# Patient Record
Sex: MALE | Race: WHITE | Employment: OTHER | URBAN - METROPOLITAN AREA
[De-identification: names, ages, dates, MRNs, and addresses within clinical notes are randomized per-mention and may not be internally consistent; named-entity substitution may affect disease eponyms.]

---

## 2019-04-26 ENCOUNTER — APPOINTMENT (OUTPATIENT)
Dept: GENERAL RADIOLOGY | Age: 84
DRG: 482 | End: 2019-04-26
Attending: ORTHOPAEDIC SURGERY
Payer: MEDICARE

## 2019-04-26 ENCOUNTER — APPOINTMENT (OUTPATIENT)
Dept: GENERAL RADIOLOGY | Age: 84
DRG: 482 | End: 2019-04-26
Attending: EMERGENCY MEDICINE
Payer: MEDICARE

## 2019-04-26 ENCOUNTER — ANESTHESIA EVENT (OUTPATIENT)
Dept: SURGERY | Age: 84
DRG: 482 | End: 2019-04-26
Payer: MEDICARE

## 2019-04-26 ENCOUNTER — ANESTHESIA (OUTPATIENT)
Dept: SURGERY | Age: 84
DRG: 482 | End: 2019-04-26
Payer: MEDICARE

## 2019-04-26 ENCOUNTER — HOSPITAL ENCOUNTER (INPATIENT)
Age: 84
LOS: 3 days | Discharge: NURSING, REHAB OR LONG-TERM CARE | DRG: 482 | End: 2019-04-29
Attending: EMERGENCY MEDICINE | Admitting: FAMILY MEDICINE
Payer: MEDICARE

## 2019-04-26 DIAGNOSIS — S72.001A CLOSED DISPLACED FRACTURE OF RIGHT FEMORAL NECK (HCC): Primary | ICD-10-CM

## 2019-04-26 DIAGNOSIS — S72.001A CLOSED FRACTURE OF RIGHT HIP, INITIAL ENCOUNTER (HCC): ICD-10-CM
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
## 2019-04-26 PROBLEM — I10 HTN (HYPERTENSION): Status: ACTIVE | Noted: 2019-04-26

## 2019-04-26 PROBLEM — I48.91 ATRIAL FIBRILLATION (HCC): Status: ACTIVE | Noted: 2019-04-26

## 2019-04-26 PROBLEM — S72.009A HIP FRACTURE (HCC): Status: ACTIVE | Noted: 2019-04-26

## 2019-04-26 LAB
ABO + RH BLD: NORMAL
ANION GAP SERPL CALC-SCNC: 8 MMOL/L (ref 3–18)
APTT PPP: 39.3 SEC (ref 23–36.4)
ATRIAL RATE: 74 BPM
BASOPHILS # BLD: 0 K/UL (ref 0–0.1)
BASOPHILS NFR BLD: 0 % (ref 0–2)
BLOOD GROUP ANTIBODIES SERPL: NORMAL
BUN SERPL-MCNC: 27 MG/DL (ref 7–18)
BUN/CREAT SERPL: 23 (ref 12–20)
CALCIUM SERPL-MCNC: 8.7 MG/DL (ref 8.5–10.1)
CALCULATED R AXIS, ECG10: -48 DEGREES
CALCULATED T AXIS, ECG11: 36 DEGREES
CHLORIDE SERPL-SCNC: 107 MMOL/L (ref 100–108)
CO2 SERPL-SCNC: 26 MMOL/L (ref 21–32)
CREAT SERPL-MCNC: 1.2 MG/DL (ref 0.6–1.3)
DIAGNOSIS, 93000: NORMAL
DIFFERENTIAL METHOD BLD: ABNORMAL
EOSINOPHIL # BLD: 0.2 K/UL (ref 0–0.4)
EOSINOPHIL NFR BLD: 2 % (ref 0–5)
ERYTHROCYTE [DISTWIDTH] IN BLOOD BY AUTOMATED COUNT: 14.4 % (ref 11.6–14.5)
EST. AVERAGE GLUCOSE BLD GHB EST-MCNC: 148 MG/DL
GLUCOSE BLD STRIP.AUTO-MCNC: 106 MG/DL (ref 70–110)
GLUCOSE BLD STRIP.AUTO-MCNC: 163 MG/DL (ref 70–110)
GLUCOSE BLD STRIP.AUTO-MCNC: 164 MG/DL (ref 70–110)
GLUCOSE BLD STRIP.AUTO-MCNC: 92 MG/DL (ref 70–110)
GLUCOSE BLD STRIP.AUTO-MCNC: 96 MG/DL (ref 70–110)
GLUCOSE SERPL-MCNC: 95 MG/DL (ref 74–99)
HBA1C MFR BLD: 6.8 % (ref 4.2–5.6)
HCT VFR BLD AUTO: 37.9 % (ref 36–48)
HGB BLD-MCNC: 12.2 G/DL (ref 13–16)
INR PPP: 2 (ref 0.8–1.2)
INR PPP: 2.3 (ref 0.8–1.2)
LYMPHOCYTES # BLD: 1.4 K/UL (ref 0.9–3.6)
LYMPHOCYTES NFR BLD: 13 % (ref 21–52)
MCH RBC QN AUTO: 27.7 PG (ref 24–34)
MCHC RBC AUTO-ENTMCNC: 32.2 G/DL (ref 31–37)
MCV RBC AUTO: 86.1 FL (ref 74–97)
MONOCYTES # BLD: 0.8 K/UL (ref 0.05–1.2)
MONOCYTES NFR BLD: 8 % (ref 3–10)
NEUTS SEG # BLD: 8.2 K/UL (ref 1.8–8)
NEUTS SEG NFR BLD: 77 % (ref 40–73)
PLATELET # BLD AUTO: 211 K/UL (ref 135–420)
PMV BLD AUTO: 10.2 FL (ref 9.2–11.8)
POTASSIUM SERPL-SCNC: 4.8 MMOL/L (ref 3.5–5.5)
PROTHROMBIN TIME: 23 SEC (ref 11.5–15.2)
PROTHROMBIN TIME: 25.5 SEC (ref 11.5–15.2)
Q-T INTERVAL, ECG07: 404 MS
QRS DURATION, ECG06: 100 MS
QTC CALCULATION (BEZET), ECG08: 413 MS
RBC # BLD AUTO: 4.4 M/UL (ref 4.7–5.5)
SODIUM SERPL-SCNC: 141 MMOL/L (ref 136–145)
SPECIMEN EXP DATE BLD: NORMAL
VENTRICULAR RATE, ECG03: 63 BPM
WBC # BLD AUTO: 10.6 K/UL (ref 4.6–13.2)

## 2019-04-26 PROCEDURE — 76010000149 HC OR TIME 1 TO 1.5 HR: Performed by: ORTHOPAEDIC SURGERY

## 2019-04-26 PROCEDURE — 99284 EMERGENCY DEPT VISIT MOD MDM: CPT

## 2019-04-26 PROCEDURE — 76210000017 HC OR PH I REC 1.5 TO 2 HR: Performed by: ORTHOPAEDIC SURGERY

## 2019-04-26 PROCEDURE — 83036 HEMOGLOBIN GLYCOSYLATED A1C: CPT

## 2019-04-26 PROCEDURE — 74011250636 HC RX REV CODE- 250/636: Performed by: EMERGENCY MEDICINE

## 2019-04-26 PROCEDURE — C1713 ANCHOR/SCREW BN/BN,TIS/BN: HCPCS | Performed by: ORTHOPAEDIC SURGERY

## 2019-04-26 PROCEDURE — 73080 X-RAY EXAM OF ELBOW: CPT

## 2019-04-26 PROCEDURE — 0QH634Z INSERTION OF INTERNAL FIXATION DEVICE INTO RIGHT UPPER FEMUR, PERCUTANEOUS APPROACH: ICD-10-PCS | Performed by: ORTHOPAEDIC SURGERY

## 2019-04-26 PROCEDURE — 65270000029 HC RM PRIVATE

## 2019-04-26 PROCEDURE — 86900 BLOOD TYPING SEROLOGIC ABO: CPT

## 2019-04-26 PROCEDURE — 80048 BASIC METABOLIC PNL TOTAL CA: CPT

## 2019-04-26 PROCEDURE — 74011000272 HC RX REV CODE- 272: Performed by: ORTHOPAEDIC SURGERY

## 2019-04-26 PROCEDURE — 74011250636 HC RX REV CODE- 250/636: Performed by: FAMILY MEDICINE

## 2019-04-26 PROCEDURE — 85025 COMPLETE CBC W/AUTO DIFF WBC: CPT

## 2019-04-26 PROCEDURE — 76060000033 HC ANESTHESIA 1 TO 1.5 HR: Performed by: ORTHOPAEDIC SURGERY

## 2019-04-26 PROCEDURE — 93005 ELECTROCARDIOGRAM TRACING: CPT

## 2019-04-26 PROCEDURE — 77030013079 HC BLNKT BAIR HGGR 3M -A: Performed by: NURSE ANESTHETIST, CERTIFIED REGISTERED

## 2019-04-26 PROCEDURE — 74011250636 HC RX REV CODE- 250/636

## 2019-04-26 PROCEDURE — 74011250636 HC RX REV CODE- 250/636: Performed by: NURSE ANESTHETIST, CERTIFIED REGISTERED

## 2019-04-26 PROCEDURE — 82962 GLUCOSE BLOOD TEST: CPT

## 2019-04-26 PROCEDURE — P9059 PLASMA, FRZ BETWEEN 8-24HOUR: HCPCS

## 2019-04-26 PROCEDURE — 74011000258 HC RX REV CODE- 258: Performed by: ORTHOPAEDIC SURGERY

## 2019-04-26 PROCEDURE — 77030008462 HC STPLR SKN PROX J&J -A: Performed by: ORTHOPAEDIC SURGERY

## 2019-04-26 PROCEDURE — 74011250636 HC RX REV CODE- 250/636: Performed by: ORTHOPAEDIC SURGERY

## 2019-04-26 PROCEDURE — 77030003029 HC SUT VCRL J&J -B: Performed by: ORTHOPAEDIC SURGERY

## 2019-04-26 PROCEDURE — 77030031139 HC SUT VCRL2 J&J -A: Performed by: ORTHOPAEDIC SURGERY

## 2019-04-26 PROCEDURE — 73502 X-RAY EXAM HIP UNI 2-3 VIEWS: CPT

## 2019-04-26 PROCEDURE — 77030032490 HC SLV COMPR SCD KNE COVD -B

## 2019-04-26 PROCEDURE — 36430 TRANSFUSION BLD/BLD COMPNT: CPT

## 2019-04-26 PROCEDURE — 85730 THROMBOPLASTIN TIME PARTIAL: CPT

## 2019-04-26 PROCEDURE — 77030012510 HC MSK AIRWY LMA TELE -B: Performed by: NURSE ANESTHETIST, CERTIFIED REGISTERED

## 2019-04-26 PROCEDURE — 71045 X-RAY EXAM CHEST 1 VIEW: CPT

## 2019-04-26 PROCEDURE — 36415 COLL VENOUS BLD VENIPUNCTURE: CPT

## 2019-04-26 PROCEDURE — 74011636637 HC RX REV CODE- 636/637: Performed by: FAMILY MEDICINE

## 2019-04-26 PROCEDURE — 77030018836 HC SOL IRR NACL ICUM -A: Performed by: ORTHOPAEDIC SURGERY

## 2019-04-26 PROCEDURE — 85610 PROTHROMBIN TIME: CPT

## 2019-04-26 PROCEDURE — 77030021352 HC CBL LD SYS DISP COVD -B

## 2019-04-26 PROCEDURE — 96374 THER/PROPH/DIAG INJ IV PUSH: CPT

## 2019-04-26 DEVICE — CANNULATED SCREW
Type: IMPLANTABLE DEVICE | Site: HIP | Status: FUNCTIONAL
Brand: ASNIS

## 2019-04-26 RX ORDER — OXYCODONE AND ACETAMINOPHEN 5; 325 MG/1; MG/1
1-2 TABLET ORAL
Status: DISCONTINUED | OUTPATIENT
Start: 2019-04-26 | End: 2019-04-29 | Stop reason: HOSPADM

## 2019-04-26 RX ORDER — FAMOTIDINE 20 MG/1
20 TABLET, FILM COATED ORAL DAILY
Status: DISCONTINUED | OUTPATIENT
Start: 2019-04-26 | End: 2019-04-29 | Stop reason: HOSPADM

## 2019-04-26 RX ORDER — ONDANSETRON 2 MG/ML
4 INJECTION INTRAMUSCULAR; INTRAVENOUS ONCE
Status: DISCONTINUED | OUTPATIENT
Start: 2019-04-26 | End: 2019-04-26 | Stop reason: HOSPADM

## 2019-04-26 RX ORDER — ACETAMINOPHEN 325 MG/1
650 TABLET ORAL EVERY 6 HOURS
Status: DISCONTINUED | OUTPATIENT
Start: 2019-04-27 | End: 2019-04-29 | Stop reason: HOSPADM

## 2019-04-26 RX ORDER — NALOXONE HYDROCHLORIDE 0.4 MG/ML
0.1 INJECTION, SOLUTION INTRAMUSCULAR; INTRAVENOUS; SUBCUTANEOUS AS NEEDED
Status: DISCONTINUED | OUTPATIENT
Start: 2019-04-26 | End: 2019-04-26 | Stop reason: HOSPADM

## 2019-04-26 RX ORDER — SODIUM CHLORIDE 0.9 % (FLUSH) 0.9 %
5-40 SYRINGE (ML) INJECTION AS NEEDED
Status: DISCONTINUED | OUTPATIENT
Start: 2019-04-26 | End: 2019-04-29 | Stop reason: HOSPADM

## 2019-04-26 RX ORDER — INSULIN LISPRO 100 [IU]/ML
INJECTION, SOLUTION INTRAVENOUS; SUBCUTANEOUS
Status: DISCONTINUED | OUTPATIENT
Start: 2019-04-26 | End: 2019-04-29 | Stop reason: HOSPADM

## 2019-04-26 RX ORDER — DEXTROSE MONOHYDRATE AND SODIUM CHLORIDE 5; .45 G/100ML; G/100ML
75 INJECTION, SOLUTION INTRAVENOUS CONTINUOUS
Status: DISCONTINUED | OUTPATIENT
Start: 2019-04-26 | End: 2019-04-27

## 2019-04-26 RX ORDER — MORPHINE SULFATE 2 MG/ML
2 INJECTION, SOLUTION INTRAMUSCULAR; INTRAVENOUS
Status: DISCONTINUED | OUTPATIENT
Start: 2019-04-26 | End: 2019-04-29 | Stop reason: HOSPADM

## 2019-04-26 RX ORDER — INSULIN LISPRO 100 [IU]/ML
INJECTION, SOLUTION INTRAVENOUS; SUBCUTANEOUS ONCE
Status: DISCONTINUED | OUTPATIENT
Start: 2019-04-26 | End: 2019-04-26 | Stop reason: HOSPADM

## 2019-04-26 RX ORDER — ONDANSETRON 2 MG/ML
4 INJECTION INTRAMUSCULAR; INTRAVENOUS
Status: DISCONTINUED | OUTPATIENT
Start: 2019-04-26 | End: 2019-04-29 | Stop reason: HOSPADM

## 2019-04-26 RX ORDER — SODIUM CHLORIDE, SODIUM LACTATE, POTASSIUM CHLORIDE, CALCIUM CHLORIDE 600; 310; 30; 20 MG/100ML; MG/100ML; MG/100ML; MG/100ML
100 INJECTION, SOLUTION INTRAVENOUS CONTINUOUS
Status: DISCONTINUED | OUTPATIENT
Start: 2019-04-26 | End: 2019-04-26 | Stop reason: HOSPADM

## 2019-04-26 RX ORDER — METFORMIN HYDROCHLORIDE 1000 MG/1
1000 TABLET ORAL 2 TIMES DAILY WITH MEALS
COMMUNITY
End: 2019-06-08

## 2019-04-26 RX ORDER — SIMVASTATIN 40 MG/1
40 TABLET, FILM COATED ORAL DAILY
Status: DISCONTINUED | OUTPATIENT
Start: 2019-04-26 | End: 2019-04-27

## 2019-04-26 RX ORDER — ONDANSETRON 2 MG/ML
4 INJECTION INTRAMUSCULAR; INTRAVENOUS
Status: DISCONTINUED | OUTPATIENT
Start: 2019-04-26 | End: 2019-04-26 | Stop reason: SDUPTHER

## 2019-04-26 RX ORDER — SODIUM CHLORIDE 0.9 % (FLUSH) 0.9 %
5-40 SYRINGE (ML) INJECTION EVERY 8 HOURS
Status: DISCONTINUED | OUTPATIENT
Start: 2019-04-26 | End: 2019-04-26 | Stop reason: HOSPADM

## 2019-04-26 RX ORDER — MELATONIN
1000 DAILY
Status: DISCONTINUED | OUTPATIENT
Start: 2019-04-26 | End: 2019-04-29 | Stop reason: HOSPADM

## 2019-04-26 RX ORDER — PROPOFOL 10 MG/ML
INJECTION, EMULSION INTRAVENOUS AS NEEDED
Status: DISCONTINUED | OUTPATIENT
Start: 2019-04-26 | End: 2019-04-26 | Stop reason: HOSPADM

## 2019-04-26 RX ORDER — GLUCOSAMINE SULFATE 1500 MG
1000 POWDER IN PACKET (EA) ORAL DAILY
COMMUNITY

## 2019-04-26 RX ORDER — DEXTROSE MONOHYDRATE 25 G/50ML
25-50 INJECTION, SOLUTION INTRAVENOUS AS NEEDED
Status: DISCONTINUED | OUTPATIENT
Start: 2019-04-26 | End: 2019-04-26 | Stop reason: HOSPADM

## 2019-04-26 RX ORDER — ONDANSETRON 2 MG/ML
INJECTION INTRAMUSCULAR; INTRAVENOUS AS NEEDED
Status: DISCONTINUED | OUTPATIENT
Start: 2019-04-26 | End: 2019-04-26 | Stop reason: HOSPADM

## 2019-04-26 RX ORDER — WARFARIN 2.5 MG/1
2.5 TABLET ORAL DAILY
COMMUNITY
End: 2019-04-29

## 2019-04-26 RX ORDER — FENTANYL CITRATE 50 UG/ML
INJECTION, SOLUTION INTRAMUSCULAR; INTRAVENOUS AS NEEDED
Status: DISCONTINUED | OUTPATIENT
Start: 2019-04-26 | End: 2019-04-26 | Stop reason: HOSPADM

## 2019-04-26 RX ORDER — SODIUM CHLORIDE 0.9 % (FLUSH) 0.9 %
5-40 SYRINGE (ML) INJECTION EVERY 8 HOURS
Status: DISCONTINUED | OUTPATIENT
Start: 2019-04-26 | End: 2019-04-29 | Stop reason: HOSPADM

## 2019-04-26 RX ORDER — CEFAZOLIN SODIUM 1 G/3ML
INJECTION, POWDER, FOR SOLUTION INTRAMUSCULAR; INTRAVENOUS AS NEEDED
Status: DISCONTINUED | OUTPATIENT
Start: 2019-04-26 | End: 2019-04-26 | Stop reason: HOSPADM

## 2019-04-26 RX ORDER — LISINOPRIL 10 MG/1
10 TABLET ORAL DAILY
COMMUNITY
End: 2019-06-08

## 2019-04-26 RX ORDER — LIDOCAINE HYDROCHLORIDE 20 MG/ML
INJECTION, SOLUTION EPIDURAL; INFILTRATION; INTRACAUDAL; PERINEURAL AS NEEDED
Status: DISCONTINUED | OUTPATIENT
Start: 2019-04-26 | End: 2019-04-26 | Stop reason: HOSPADM

## 2019-04-26 RX ORDER — FLUMAZENIL 0.1 MG/ML
0.2 INJECTION INTRAVENOUS
Status: DISCONTINUED | OUTPATIENT
Start: 2019-04-26 | End: 2019-04-26 | Stop reason: HOSPADM

## 2019-04-26 RX ORDER — MORPHINE SULFATE 4 MG/ML
4 INJECTION INTRAVENOUS
Status: COMPLETED | OUTPATIENT
Start: 2019-04-26 | End: 2019-04-26

## 2019-04-26 RX ORDER — SODIUM CHLORIDE 9 MG/ML
250 INJECTION, SOLUTION INTRAVENOUS AS NEEDED
Status: DISCONTINUED | OUTPATIENT
Start: 2019-04-26 | End: 2019-04-29 | Stop reason: HOSPADM

## 2019-04-26 RX ORDER — LISINOPRIL 10 MG/1
10 TABLET ORAL DAILY
Status: DISCONTINUED | OUTPATIENT
Start: 2019-04-26 | End: 2019-04-29 | Stop reason: HOSPADM

## 2019-04-26 RX ORDER — MAGNESIUM SULFATE 100 %
4 CRYSTALS MISCELLANEOUS AS NEEDED
Status: DISCONTINUED | OUTPATIENT
Start: 2019-04-26 | End: 2019-04-26 | Stop reason: HOSPADM

## 2019-04-26 RX ORDER — LOVASTATIN 40 MG/1
40 TABLET ORAL DAILY
COMMUNITY

## 2019-04-26 RX ORDER — RANITIDINE 150 MG/1
150 TABLET, FILM COATED ORAL 2 TIMES DAILY
COMMUNITY

## 2019-04-26 RX ORDER — SODIUM CHLORIDE, SODIUM LACTATE, POTASSIUM CHLORIDE, CALCIUM CHLORIDE 600; 310; 30; 20 MG/100ML; MG/100ML; MG/100ML; MG/100ML
100 INJECTION, SOLUTION INTRAVENOUS CONTINUOUS
Status: DISPENSED | OUTPATIENT
Start: 2019-04-26 | End: 2019-04-28

## 2019-04-26 RX ORDER — MAGNESIUM SULFATE 100 %
4 CRYSTALS MISCELLANEOUS AS NEEDED
Status: DISCONTINUED | OUTPATIENT
Start: 2019-04-26 | End: 2019-04-29 | Stop reason: HOSPADM

## 2019-04-26 RX ORDER — HYDROMORPHONE HYDROCHLORIDE 1 MG/ML
0.5 INJECTION, SOLUTION INTRAMUSCULAR; INTRAVENOUS; SUBCUTANEOUS
Status: DISCONTINUED | OUTPATIENT
Start: 2019-04-26 | End: 2019-04-26 | Stop reason: HOSPADM

## 2019-04-26 RX ORDER — SODIUM CHLORIDE 0.9 % (FLUSH) 0.9 %
5-40 SYRINGE (ML) INJECTION AS NEEDED
Status: DISCONTINUED | OUTPATIENT
Start: 2019-04-26 | End: 2019-04-26 | Stop reason: HOSPADM

## 2019-04-26 RX ORDER — CEFAZOLIN SODIUM 2 G/50ML
2 SOLUTION INTRAVENOUS EVERY 8 HOURS
Status: COMPLETED | OUTPATIENT
Start: 2019-04-27 | End: 2019-04-27

## 2019-04-26 RX ORDER — KETOROLAC TROMETHAMINE 30 MG/ML
15 INJECTION, SOLUTION INTRAMUSCULAR; INTRAVENOUS
Status: DISCONTINUED | OUTPATIENT
Start: 2019-04-26 | End: 2019-04-29 | Stop reason: HOSPADM

## 2019-04-26 RX ORDER — DEXTROSE MONOHYDRATE 25 G/50ML
25-50 INJECTION, SOLUTION INTRAVENOUS AS NEEDED
Status: DISCONTINUED | OUTPATIENT
Start: 2019-04-26 | End: 2019-04-29 | Stop reason: HOSPADM

## 2019-04-26 RX ORDER — ACETAMINOPHEN 325 MG/1
650 TABLET ORAL
Status: DISCONTINUED | OUTPATIENT
Start: 2019-04-26 | End: 2019-04-29 | Stop reason: HOSPADM

## 2019-04-26 RX ORDER — VANCOMYCIN HYDROCHLORIDE 1 G/20ML
INJECTION, POWDER, LYOPHILIZED, FOR SOLUTION INTRAVENOUS AS NEEDED
Status: DISCONTINUED | OUTPATIENT
Start: 2019-04-26 | End: 2019-04-26 | Stop reason: HOSPADM

## 2019-04-26 RX ORDER — GLIMEPIRIDE 4 MG/1
4 TABLET ORAL
COMMUNITY

## 2019-04-26 RX ORDER — FLAXSEED OIL 1000 MG
CAPSULE ORAL
COMMUNITY

## 2019-04-26 RX ADMIN — LIDOCAINE HYDROCHLORIDE 40 MG: 20 INJECTION, SOLUTION EPIDURAL; INFILTRATION; INTRACAUDAL; PERINEURAL at 17:45

## 2019-04-26 RX ADMIN — MORPHINE SULFATE 4 MG: 4 INJECTION INTRAVENOUS at 05:08

## 2019-04-26 RX ADMIN — KETOROLAC TROMETHAMINE 15 MG: 30 INJECTION, SOLUTION INTRAMUSCULAR at 19:00

## 2019-04-26 RX ADMIN — PROPOFOL 100 MG: 10 INJECTION, EMULSION INTRAVENOUS at 17:45

## 2019-04-26 RX ADMIN — FENTANYL CITRATE 25 MCG: 50 INJECTION, SOLUTION INTRAMUSCULAR; INTRAVENOUS at 18:15

## 2019-04-26 RX ADMIN — SODIUM CHLORIDE, SODIUM LACTATE, POTASSIUM CHLORIDE, AND CALCIUM CHLORIDE 100 ML/HR: 600; 310; 30; 20 INJECTION, SOLUTION INTRAVENOUS at 06:55

## 2019-04-26 RX ADMIN — KETOROLAC TROMETHAMINE 15 MG: 30 INJECTION, SOLUTION INTRAMUSCULAR at 10:06

## 2019-04-26 RX ADMIN — ONDANSETRON 4 MG: 2 INJECTION INTRAMUSCULAR; INTRAVENOUS at 17:51

## 2019-04-26 RX ADMIN — INSULIN LISPRO 2 UNITS: 100 INJECTION, SOLUTION INTRAVENOUS; SUBCUTANEOUS at 10:06

## 2019-04-26 RX ADMIN — Medication 10 ML: at 21:41

## 2019-04-26 RX ADMIN — HYDROMORPHONE HYDROCHLORIDE 0.5 MG: 1 INJECTION, SOLUTION INTRAMUSCULAR; INTRAVENOUS; SUBCUTANEOUS at 19:09

## 2019-04-26 RX ADMIN — FENTANYL CITRATE 25 MCG: 50 INJECTION, SOLUTION INTRAMUSCULAR; INTRAVENOUS at 17:48

## 2019-04-26 RX ADMIN — FENTANYL CITRATE 25 MCG: 50 INJECTION, SOLUTION INTRAMUSCULAR; INTRAVENOUS at 17:54

## 2019-04-26 RX ADMIN — FENTANYL CITRATE 25 MCG: 50 INJECTION, SOLUTION INTRAMUSCULAR; INTRAVENOUS at 18:20

## 2019-04-26 RX ADMIN — DEXTROSE MONOHYDRATE AND SODIUM CHLORIDE 75 ML/HR: 5; .45 INJECTION, SOLUTION INTRAVENOUS at 21:43

## 2019-04-26 RX ADMIN — CEFAZOLIN SODIUM 2 G: 1 INJECTION, POWDER, FOR SOLUTION INTRAMUSCULAR; INTRAVENOUS at 17:32

## 2019-04-26 NOTE — ANESTHESIA PREPROCEDURE EVALUATION
Relevant Problems No relevant active problems Anesthetic History No history of anesthetic complications Review of Systems / Medical History Patient summary reviewed and pertinent labs reviewed Pulmonary Within defined limits Neuro/Psych Within defined limits Cardiovascular Hypertension: well controlled Dysrhythmias : atrial fibrillation Exercise tolerance: >4 METS 
  
GI/Hepatic/Renal 
Within defined limits Endo/Other Diabetes: well controlled, type 2 Other Findings Comments: Right hip fracture Physical Exam 
 
Airway Mallampati: II 
TM Distance: 4 - 6 cm Neck ROM: normal range of motion Mouth opening: Normal 
 
 Cardiovascular Rhythm: irregular Dental 
No notable dental hx Pulmonary Breath sounds clear to auscultation Abdominal 
GI exam deferred Other Findings Anesthetic Plan ASA: 3, emergent Anesthesia type: general 
 
 
 
 
Induction: Intravenous Anesthetic plan and risks discussed with: Patient Surgeon declares case an emergency . Aware of PT/INR -2.0,wishes to proceed

## 2019-04-26 NOTE — ROUTINE PROCESS
Bedside and Verbal shift change report given to Gina Sylvester (oncoming nurse) by Jose Armando Case (offgoing nurse). Report included the following information SBAR, Kardex, Intake/Output, MAR and Cardiac Rhythm Afib.

## 2019-04-26 NOTE — CONSULTS
Consult Note        Assessment:    1. Right impacted subcapital hip fractre      PLAN:    1. To the OR today for a percutaneous pinning. Understanding risks, benefits and alternatives to the procedure he wishes to proceed. Consent signed. He is cleared medically. Remain npo           HPI: Jamel Mcgarry is a 80 y.o. male patient presents with right hip pain. He slipped and fell at home last night causing immediate pain and the inability to ambulate. He was brought in to the ED where xrays revealed a right hip fracture. He has been admitted for further management. Past Medical History:   Diagnosis Date    Alzheimer's disease     At[facility] fibrillation ([facility]     Diabetes ([facility]     Hypertension    History reviewed. No pertinent surgical history. Prior to Admission medications    Medication Sig Start Date End Date Taking? Authorizing Provider   cholecalciferol (VITAMIN D3) 1,000 unit cap Take 1,000 Units by mouth daily. Yes Other, MD Herbert   flaxseed oil 1,000 mg cap Take  by mouth. Yes Chayito, MD Herbert   glimepiride (AMARYL) 4 mg tablet Take 4 mg by mouth every morning. Yes Other, MD Herbert   lisinopril (PRINIVIL, ZESTRIL) 10 mg tablet Take 10 mg by mouth daily. Yes Herbert Stratton MD   lovastatin (MEVACOR) 40 mg tablet Take 40 mg by mouth daily. Yes Chayito, MD Herbert   metFORMIN (GLUCOPHAGE) 1,000 mg tablet Take 1,000 mg by mouth two (2) times daily (with meals). Yes Chayito, MD Herbert   warfarin (COUMADIN) 2.5 mg tablet Take 2.5 mg by mouth daily. Yes Chayito, MD Herbert   raNITIdine (ZANTAC) 150 mg tablet Take 150 mg by mouth two (2) times a day.    Yes Other, MD Herbert   No Known Allergies   Social History     Socioeconomic History    Marital status:      Spouse name: Not on file    Number of children: Not on file    Years of education: Not on file    Highest education level: Not on file   Occupational History    Not on file   Social Needs    Financial resource strain: Not on file   [facility] insecurity:     Worry: Not on file     Inability: Not on file    Transportation needs:     Medical: Not on file     Non-medical: Not on file   Tobacco Use    Smoking status: Not on file   Substance and Sexual Activity    Alcohol use: Not Currently    Drug use: Not Currently    Sexual activity: Not on file   Lifestyle    Physical activity:     Days per week: Not on file     Minutes per session: Not on file    Stress: Not on file   Relationships    Social connections:     Talks on phone: Not on file     Gets together: Not on file     Attends Orthodoxy service: Not on file     Active member of club or organization: Not on file     Attends meetings of clubs or organizations: Not on file     Relationship status: Not on file    Intimate partner violence:     Fear of current or ex partner: Not on file     Emotionally abused: Not on file     Physically abused: Not on file     Forced sexual activity: Not on file   Other Topics Concern    Not on file   Social History Narrative    Not on file       Blood pressure 136/66, pulse 83, temperature 98.8 °F (37.1 °C), resp. rate 16, height 5' 6\" (1.676 m), weight 63.5 kg (140 lb), SpO2 91 %. CBC w/Diff   Lab Results   Component Value Date/Time    WBC 10.6 04/26/2019 05:44 AM    RBC 4.40 (L) 04/26/2019 05:44 AM    HCT 37.9 04/26/2019 05:44 AM          Physical Assessment:  General: in no apparent distress   Extremities:  Skin intact right hip. Pain with internal and external rotation. Thighs and calves soft. Will flex and extend ankles and toes to command. Sensation intact to light touch   Dressing:  None   DVT Exam:   No exam evidence to suggest DVT. Compartments soft and NT. Radiology:   Right hip xrays:  BONES: Intact ilioischial and iliopectineal lines. Mildly impacted subcapital  femoral neck fracture.  Moderately severe right hip joint osteoarthritis with  prominent osteophyte formation and foci of heterotopic ossification throughout  the acetabular dirkrum.            - Viviana Jones PA-C  4/26/2019  Office [phone] [place] [phone]

## 2019-04-26 NOTE — PROGRESS NOTES
[id number] Patient received from ER via stretcher. Patient alert & oriented x4. Wife with patient. Call light in reach & side rails up x3. Patient instructed to remain NPO & on bedrest. Patient verbalizes understanding.

## 2019-04-26 NOTE — OP NOTES
BRIEF OPERATIVE NOTE    Date of Procedure: 4/26/2019     Preoperative Diagnosis: Closed fracture of right hip requiring operative repair with routine healing, subsequent encounter [S72.001D]    Postoperative Diagnosis: Closed fracture of right hip requiring operative repair with       Procedure: Procedure(s):  right hip percutaneous pinning    Surgeon(s) and Role:     Colby Regan MD - Primary     * Darby Arias MD - Assisting    Anesthesia: General     Findings: min displ r femoral neck fx     Estimated Blood Loss: 25  Replaced0      [id number]        Urineno cruz placed  Specimens: * No specimens in log *     Tubes/Drains: None    Needle/sponge count:  Correct    Complications: 0    Plan    Up at [facility]  May resume coumadin  To rehab in 2-3 days  rto 1 month

## 2019-04-26 NOTE — ASSESSMENT & PLAN NOTE
Admit to surgical floor Orthopedic consult Pain management Based on results of INR will consider reversal with FFP prior to surgery NPO 
IVF Diabetes management with insulin sliding scale protocol

## 2019-04-26 NOTE — ANESTHESIA POSTPROCEDURE EVALUATION
Procedure(s): 
right hip percutaneous pinning. general 
 
Anesthesia Post Evaluation Multimodal analgesia: multimodal analgesia used between 6 hours prior to anesthesia start to PACU discharge Patient location during evaluation: bedside Patient participation: complete - patient participated Level of consciousness: awake Pain management: adequate Airway patency: patent Anesthetic complications: no 
Cardiovascular status: stable Respiratory status: acceptable Hydration status: acceptable Post anesthesia nausea and vomiting:  controlled Vitals Value Taken Time /42 4/26/2019  6:45 PM  
Temp 36.8 °C (98.3 °F) 4/26/2019  6:45 PM  
Pulse 63 4/26/2019  6:45 PM  
Resp 16 4/26/2019  6:45 PM  
SpO2 96 % 4/26/2019  6:45 PM

## 2019-04-26 NOTE — ROUTINE PROCESS
Bedside and Verbal shift change report given to Daniel Dumas RN (oncoming nurse) by Subha Land RN 
 (offgoing nurse). Report included the following information SBAR, Kardex and MAR.

## 2019-04-26 NOTE — H&P
Internal Medicine History and Physical 
   
 
 
Subjective HPI: Steff Fitch is a 80 y.o. male who presented to the ED with right hip pain and inability to ambulate after an accidental fall at home. He slipped and fell. No associated sx prior to fall. Could not bear weight on the RLE after the fall. On chronic anticoagulation. ED evaluation revealed right hip fracture and likely acetabular rim fracture. Orthopedic consult called. Will admit for further evaluation and treatment PMHx: 
Past Medical History:  
Diagnosis Date  Alzheimer's disease  Atrial fibrillation ([facility] [address] [facility]  Diabetes ([facility] [address]  Hypertension PSurgHx: 
History reviewed. No pertinent surgical history. SocialHx: 
Social History Socioeconomic History  Marital status:  Spouse name: Not on file  Number of children: Not on file  Years of education: Not on file  Highest education level: Not on file Occupational History  Not on file Social Needs  Financial resource strain: Not on file  Food insecurity:  
  Worry: Not on file Inability: Not on file  Transportation needs:  
  Medical: Not on file Non-medical: Not on file Tobacco Use  Smoking status: Not on file Substance and Sexual Activity  Alcohol use: Not Currently  Drug use: Not Currently  Sexual activity: Not on file Lifestyle  Physical activity:  
  Days per week: Not on file Minutes per session: Not on file  Stress: Not on file Relationships  Social connections:  
  Talks on phone: Not on file Gets together: Not on file Attends Latter day service: Not on file Active member of club or organization: Not on file Attends meetings of clubs or organizations: Not on file Relationship status: Not on file  Intimate partner violence:  
  Fear of current or ex partner: Not on file Emotionally abused: Not on file Physically abused: Not on file Forced sexual activity: Not on file Other Topics Concern  Not on file Social History Narrative  Not on file Allergies: 
No Known Allergies FamilyHx: 
History reviewed. No pertinent family history. Prior to Admission Medications Prescriptions Last Dose Informant Patient Reported? Taking? cholecalciferol (VITAMIN D3) 1,000 unit cap   Yes Yes Sig: Take 1,000 Units by mouth daily. flaxseed oil 1,000 mg cap   Yes Yes Sig: Take  by mouth.  
glimepiride (AMARYL) 4 mg tablet   Yes Yes Sig: Take 4 mg by mouth every morning. lisinopril (PRINIVIL, ZESTRIL) 10 mg tablet   Yes Yes Sig: Take 10 mg by mouth daily. lovastatin (MEVACOR) 40 mg tablet   Yes Yes Sig: Take 40 mg by mouth daily. metFORMIN (GLUCOPHAGE) 1,000 mg tablet   Yes Yes Sig: Take 1,000 mg by mouth two (2) times daily (with meals). raNITIdine (ZANTAC) 150 mg tablet   Yes Yes Sig: Take 150 mg by mouth two (2) times a day. warfarin (COUMADIN) 2.5 mg tablet   Yes Yes Sig: Take 2.5 mg by mouth daily. Facility-Administered Medications: None Review of Systems: 
CONST: fever(-),   chills(-),   fatigue(-),   malaise(-) SKIN: itching(-),   rash(-) EYES: vision - no change from baseline ENT: ear pain(-),   nasal discharge(-),   sore throat(-),   voice change(-) RESP: SOB(-),   cough(-),   hemoptysis(-) CV: chest pain(-),   PND(-),   orthopnea(-),   exertional dyspnea(-),   palpitations(-), syncope(-) 
GI: abd pain(-),   nausea(-),   vomiting(-),   diarrhea(-),   melena(-),   hematemesis(-), hematochesia(-) 
: dysuria(-),   frequency(-),   urgency(-),   hematuria(-) 
MS: arthralgias(-),   myalgias(-), right hip pain, right elbow pain. NEURO: speech w/o change,   tremors(-),   seizures(-),   numbness(-),   dizziness(-) Objective Visit Vitals /53 Pulse 68 Temp 98.1 °F (36.7 °C) Resp 16 Wt 63.5 kg (140 lb) SpO2 94% Physical Exam: 
CONST: NAD,   VSS reviewed SKIN: rashes(-),   ulcers(-) EYES: PERRL,   EOMI,   sclerae w/o jaundice HENT: HEENT,   normal appearing nose and ears,   normal nasal mucosa and turbinates NECK: supple,   no LA,   trachea is midline,   thyroid w/o goiter or palpable nodules RESP: normal respiratory effort,   wheezes(-),   rales(-),   rhochi(-),   no consolidation on percussion CHEST: normal axillae,   retractions(-),   use of accessory muscles(-) CV: JVD(-),   carotid bruits(-),   RRR,   gallops(-),   murmurs(-),   edema LE(-),   abd bruits(-),   peripheral pulses normal 
ABD: soft, tender(-),   distended(-),   HSM(-),   BS(+) in all quadrants,   peritoneal signs(-) 
MS: right hip pain with mobilization consistent with the newly diagnosed hip fracture,  clubbing(-),   peripheral cyanosis(-) NEURO: speech normal, tremors(-),   CN II-XII(-),   lateralizing signs(-) PSYCH: AOC x 3,   appropriate affect,   non-suicidal 
 
 
 
Imaging Reviewed: 
No results found. Assessment/Plan Active Hospital Problems Diagnosis Date Noted  [facility] 04/26/2019 Closed right hip fracture, initial encounter ([facility] Admit to surgical floor Orthopedic consult Pain management Based on results of INR will consider reversal with FFP prior to surgery NPO 
IVF Diabetes management with insulin sliding scale protocol - Cont acceptable home medications for chronic conditions  
- DVT protocol and GI prophylaxis I have personally reviewed all pertinent labs, films and EKGs that have officially resulted. I reviewed available electronic documentation outlining the initial presentation as well as the emergency room physician's encounter. Total time spent with patient and chart review, orders and documentation - 45min out of which more than 50% spent face-to-face with patient. Evens Vaughan MD 
4/26/2019  
6:02 AM 
 
 
[facility]

## 2019-04-26 NOTE — ED PROVIDER NOTES
[facility] EMERGENCY DEPT 
 
 
3:55 AM 
 
Date: 4/26/2019 Patient Name: Elisabeth Marie History of Presenting Illness Chief Complaint Patient presents with  Fall  Hip Pain  Arm Pain 80 y.o. male with noted past medical history who presents to the emergency department status post a fall. Patient is accompanied to the ER by his wife and his son. Patient states that he woke up in the middle the night and felt like he needed to go the bathroom. When he was standing up he slipped and fell he denies any prodrome of presyncope dizziness or anything that made him feel like he might pass out. In fact he states that he just slipped and fell by accident. With the fall he has some mild right elbow pain and some worse right hip pain. In fact the pain was so significant in the right hip that he is unable to ambulate status post the fall. Virus was called and the patient was transferred to the ER for evaluation. Patient denies any other associated signs or symptoms. Patient denies any other complaints. Nursing notes regarding the HPI and triage nursing notes were reviewed. Prior medical records were reviewed. Past History Past Medical History: 
Past Medical History:  
Diagnosis Date  Alzheimer's disease  Atrial fibrillation ([facility] [address]  Diabetes ([facility] [address]  Hypertension Past Surgical History: 
History reviewed. No pertinent surgical history. Family History: 
History reviewed. No pertinent family history. Social History: 
Social History Tobacco Use  Smoking status: Not on file Substance Use Topics  Alcohol use: Not Currently  Drug use: Not Currently Allergies: 
No Known Allergies Patient's primary care provider (as noted in EPIC):  UNKNOWN Review of Systems Visit Vitals Temp 98.1 °F (36.7 °C) Resp 16 Wt 63.5 kg (140 lb) PHYSICAL EXAM: 
 
CONSTITUTIONAL: Alert, in no apparent distress; well-developed; well-nourished. HEAD:  Normocephalic, atraumatic. No Battles sign. No Raccoons eyes. EYES:  EOM's intact. Normal conjunctiva. Anicteric sclera. ENTM: Nose: no rhinorrhea; Oropharynx:  mucous membranes moist 
 
Neck:  No JVD. No cervical vertebral bony point tenderness or step-off. RESP: Chest clear, equal breath sounds. CARDIOVASCULAR:  Regular rate and rhythm. No murmurs, rubs, or gallops. GI: Normal bowel sounds, abdomen soft and non-tender. No masses or organomegaly. : No costo-vertebral angle tenderness. BACK:  No TLS vertebral bony point tenderness or step-off. UPPER EXT:  Normal inspection LOWER EXT: No edema, no calf tenderness. Distal pulses intact. Right hip exam: Anterior right hip moderate tenderness to palpation. There is no leg leg discrepancy and no external or internal rotation of the right leg. NEURO: Grossly normal motor and sensation. SKIN: No rashes; Normal for age and stage. PSYCH:  Alert and oriented, normal affect. DIFFERENTIAL DIAGNOSES/ MEDICAL DECISION MAKING: 
Contusion, sprain, dislocation, fracture, ligamentous tear/ disruption or a combination of the above. Diagnostic Study Results Abnormal lab results from this emergency department encounter: 
Labs Reviewed - No data to display Lab values for this patient within approximately the last 12 hours: 
No results found for this or any previous visit (from the past 12 hour(s)). Radiologist and cardiologist interpretations if available at time of this note: No results found. Right hip x-ray as read by the radiologist: 
Findings: 
Mildly comminuted subcapital fracture of the right femoral neck. Osseous fragment of the superolateral acetabulum with subtle lucent line extending across acetabular roof medially, age indeterminate, suspicious for acetabular fracture. Consider CT for further characterization. Medication(s) ordered for patient during this emergency visit encounter: Medications  
morphine injection 4 mg (4 mg IntraVENous Given 4/26/19 0508) Medical Decision Making I am the first provider for this patient. I reviewed the vital signs, available nursing notes, past medical history, past surgical history, family history and social history. Vital Signs:  Reviewed the patient's vital signs. ED COURSE:   
 
IMPRESSION AND MEDICAL DECISION MAKING: 
Based upon the patients presentation with noted HPI and PE, along with the work up done in the emergency department, I believe that the patient is having noted hip fracture from noted fall. Consult Orthopedist on Call The on call orthopedist group/individual was called and the patient was presented for orthopedic consult. I personally spoke with Dr. Jeronimo Kaminski, in the orthopedist group, about the patient's presentation and management. I subsequently placed the noted orthopedist on the treatment team. 
 
Admit to Hospitalist 
 
The patient was presented to the accepting hospitalist, Dr. Mazin Byrne. The patient's primary doctor is UNKNOWN, and admissions for this physician are with the hospitalist.  If the patient has no primary doctor, then admission is to the hospitalist as well. As the emergency physician, I wrote courtesy admission orders for the hospitalist physician. The courtesy orders included explicit instructions for the floor nursing staff to call the admitting attending physician upon patient arrival on the floor. Coding Diagnoses Clinical Impression: 1. Closed displaced fracture of right femoral neck ([address] 2. Fall, initial encounter Disposition Disposition:  Admit. Marta Henning M.D. NOAH Board Certified Emergency Physician Provider Attestation: If a scribe was utilized in generation of this patient record, I personally performed the services described in the documentation, reviewed the documentation, as recorded by the scribe in my presence, and it accurately records the patient's history of presenting illness, review of systems, patient physical examination, and procedures performed by me as the attending physician. Ashleigh Porras M.D. [facility] Board Certified Emergency Physician 4/26/2019.

## 2019-04-26 NOTE — PROGRESS NOTES
Nutrition initial assessment/ 
Plan of care RECOMMENDATIONS: 
 
1. NPO; Advance diet when medically indicated 2. Monitor weight, labs and PO intake 3. RD to follow GOALS:  
 
1. PO intake meets >75% of protein/calorie needs by 5/1 
2. Weight Maintenance (+/- 1-2 lb by 5/1) ASSESSMENT:  
 
Weight status is classified as normal per BMI of 22.6. However, patient is at nutrition risk due to BMI below 23 with patient above 72years of age. Currently not meeting nutrition needs due to NPO diet order for surgery. Labs noted. Nutrition recommendations listed. RD to follow. Nutrition Diagnoses:  
Inadequate oral intake related to surgery as evidenced by NPO diet order. Nutrition Risk:  [] High  [x] Moderate []  Low SUBJECTIVE/OBJECTIVE:  
  
Admitted s/p fall with right hip fracture. Has history of Alzheimer's disease, diabetes, HTN and a-fib. Currently NPO for surgery today. No known food allergies. Will monitor. Information Obtained from:  
 [x] Chart Review [x] Patient 
 [] Family/Caregiver 
 [] Nurse/Physician 
 [] Interdisciplinary Meeting/Rounds Diet: NPO Medications: [x] Reviewed (IVF: Lactated ringers infusion at 100 ml/hr) Allergies: [x] Reviewed Encounter Diagnoses ICD-10-CM ICD-9-CM 1. Closed displaced fracture of right femoral neck (HCC) S72.001A 820.8 2. Fall, initial encounter [address] Edwin Martinesa E012.4 Past Medical History:  
Diagnosis Date  Alzheimer's disease  Atrial fibrillation ([facility] [address]  Diabetes ([facility] [address] [facility]  Hypertension Labs:   
Lab Results Component Value Date/Time Sodium 141 04/26/2019 05:44 AM  
 Potassium 4.8 04/26/2019 05:44 AM  
 Chloride 107 04/26/2019 05:44 AM  
 CO2 26 04/26/2019 05:44 AM  
 Anion gap 8 04/26/2019 05:44 AM  
 Glucose 95 04/26/2019 05:44 AM  
 BUN 27 (H) 04/26/2019 05:44 AM  
 Creatinine 1.20 04/26/2019 05:44 AM  
 Calcium 8.7 04/26/2019 05:44 AM  
 
Anthropometrics: BMI (calculated): 22.6 Last 3 Recorded Weights in this Encounter 04/26/19 1796 Weight: 63.5 kg (140 lb) Ht Readings from Last 1 Encounters:  
04/26/19 5' 6\" (1.676 m) Weight Metrics 4/26/2019 Weight 140 lb BMI 22.6 kg/m2 No data found. IBW: 142 lb %IBW: 98% Estimated Nutrition Needs: [x] MSJ Calories: 1622 Kcal Based on:   [x] Actual BW   
Protein:   70-83 g Based on:   [x] Actual BW Fluid:       7677-1506 ml Based on:   [x] Actual BW  
 
 [x] No Cultural, Tenriism or ethnic dietary need identified. [] Cultural, Tenriism and ethnic food preferences identified and addressed Wt Status:  [x] Normal (18.6 - 24.9) [] Underweight (<18.5) [] Overweight (25 - 29.9) [] Mild Obesity (30 - 34.9)  [] Moderate Obesity (35 - 39.9) [] Morbid Obesity (40+) Nutrition Problems Identified:  
[x] Suboptimal PO intake vs NPO 
[] Food Allergies [] Difficulty chewing/swallowing/poor dentition 
[] Constipation/Diarrhea  
[] Nausea/Vomiting  
[] None 
[] Other:  
 
Plan:  
[] Therapeutic Diet 
[]  Obtained/adjusted food preferences/tolerances and/or snacks options  
[]  Supplements added  
[] Occupational therapy following for feeding techniques []  HS snack added  
[]  Modify diet texture  
[]  Modify diet for food allergies []  Educate patient  
[]  Assist with menu selection []  Monitor PO intake on meal rounds  
[x]  Continue inpatient monitoring and intervention  
[]  Participated in discharge planning/Interdisciplinary rounds/Team meetings  
[]  Other:  
 
Education Needs: 
 [x] Not appropriate for teaching at this time due to: NPO 
 [] Identified and addressed Nutrition Monitoring and Evaluation: 
[x] Continue ongoing monitoring and intervention 
[] Chayito Cornelius 29

## 2019-04-26 NOTE — PROGRESS NOTES
Reason for Admission:   R hip fracture RRAT Score:   5 Plan for utilizing home health:     Not @ this time, will need SNF Current Advanced Directive/Advance Care Plan:  Not on file Likelihood of Readmission:  Low/Green Transition of Care Plan:    SNF for rehab & then out-pt follow up when medically stable. Chart reviewed. Met with pt & spouse, verified all demographics. States has MCR/AARP ins. Dr. Imitaz Fuller is his PCP. NOK: Mercy Bowling, spouse, with whom he lives with. Uses no DME. Independent with ADL's prior to admit. Made them aware that he would likely need SNF for rehab after surgery, agreeable. SNF list provided & placed on chart, they would like [facility], asked them to review list & provide couple more choices. Posted in e-discharge to [facility] & spoke with Kelsi in admissions([facility]). Will cont to follow for further needs. Peace Ortega RN,ext 1711 Patient has designated his spouse to participate in his/her discharge plan and to receive any needed information. Name: Mercy Bowling Address: 
Phone number: [phone] Care Management Interventions PCP Verified by CM: Yes(Dr. Imtiaz Fuller) Palliative Care Criteria Met (RRAT>21 & CHF Dx)?: No 
Transition of Care Consult (CM Consult): Discharge Planning Discharge Durable Medical Equipment: No 
Physical Therapy Consult: No 
Occupational Therapy Consult: No 
Speech Therapy Consult: No 
Current Support Network: Lives with Spouse Confirm Follow Up Transport: Family Plan discussed with Pt/Family/Caregiver: Yes Discharge Location Discharge Placement: Skilled nursing facility

## 2019-04-26 NOTE — PROGRESS NOTES
Ortho Pt interviewed and examined. He denies injury to other body parts except abrasion to r elbow. ON PE pt is alert and answers questions appropriately. cspione from without pain. r elbow has abrasions but no pain with rom. R le aligned normally but has pain with IR/ER. AT/GS intact  Distal pulses intact. Have reviewed xrays. Pt has impacted R femoral neck fx. Rec perc pinning. INR is 2.3 but I believe that the risks of proceeding with surgery are less than delaying surgery. Discussed with anesthesia dept who wishes to check repeat INR. Apparently specimen sent earlier today but can not be located. Will proceed with surgery. Risks of death infection nv injury PA malunion poss of future surgery were presented. Pt wishes to proceed 
 
jab

## 2019-04-26 NOTE — PROGRESS NOTES
Problem: Discharge Planning Goal: *Discharge to safe environment Outcome: Progressing Towards Goal 
      Plan  SNF

## 2019-04-26 NOTE — ED TRIAGE NOTES
Pt c/o mechanical fall while getting up to go to bathroom. C/o right arm and hip pain. Denies any head injury or LOC

## 2019-04-26 NOTE — PROGRESS NOTES
Bedside and Verbal shift change report given to CHELO Grajeda (oncoming nurse) by Nghia Deluca RN (offgoing nurse). Report included the following information SBAR, Kardex, Intake/Output and MAR.  
 
1715-Off floor for surgery. Bedside and Verbal shift change report given to Billy Guerra RN (oncoming nurse) by CHELO Grajeda (offgoing nurse). Report included the following information SBAR, Kardex, Procedure Summary, Intake/Output and MAR.

## 2019-04-27 LAB
ANION GAP SERPL CALC-SCNC: 7 MMOL/L (ref 3–18)
BLD PROD TYP BPU: NORMAL
BLD PROD TYP BPU: NORMAL
BPU ID: NORMAL
BPU ID: NORMAL
BUN SERPL-MCNC: 34 MG/DL (ref 7–18)
BUN/CREAT SERPL: 24 (ref 12–20)
CALCIUM SERPL-MCNC: 7.5 MG/DL (ref 8.5–10.1)
CALLED TO:,BCALL1: NORMAL
CHLORIDE SERPL-SCNC: 104 MMOL/L (ref 100–108)
CO2 SERPL-SCNC: 27 MMOL/L (ref 21–32)
CREAT SERPL-MCNC: 1.43 MG/DL (ref 0.6–1.3)
ERYTHROCYTE [DISTWIDTH] IN BLOOD BY AUTOMATED COUNT: 14.8 % (ref 11.6–14.5)
GLUCOSE BLD STRIP.AUTO-MCNC: 117 MG/DL (ref 70–110)
GLUCOSE BLD STRIP.AUTO-MCNC: 179 MG/DL (ref 70–110)
GLUCOSE BLD STRIP.AUTO-MCNC: 193 MG/DL (ref 70–110)
GLUCOSE BLD STRIP.AUTO-MCNC: 198 MG/DL (ref 70–110)
GLUCOSE BLD STRIP.AUTO-MCNC: 200 MG/DL (ref 70–110)
GLUCOSE BLD STRIP.AUTO-MCNC: 64 MG/DL (ref 70–110)
GLUCOSE BLD STRIP.AUTO-MCNC: 71 MG/DL (ref 70–110)
GLUCOSE BLD STRIP.AUTO-MCNC: 72 MG/DL (ref 70–110)
GLUCOSE SERPL-MCNC: 181 MG/DL (ref 74–99)
HCT VFR BLD AUTO: 32.1 % (ref 36–48)
HGB BLD-MCNC: 10.4 G/DL (ref 13–16)
INR PPP: 2.5 (ref 0.8–1.2)
MCH RBC QN AUTO: 27.8 PG (ref 24–34)
MCHC RBC AUTO-ENTMCNC: 32.4 G/DL (ref 31–37)
MCV RBC AUTO: 85.8 FL (ref 74–97)
PLATELET # BLD AUTO: 190 K/UL (ref 135–420)
PMV BLD AUTO: 11 FL (ref 9.2–11.8)
POTASSIUM SERPL-SCNC: 4.6 MMOL/L (ref 3.5–5.5)
PROTHROMBIN TIME: 26.8 SEC (ref 11.5–15.2)
RBC # BLD AUTO: 3.74 M/UL (ref 4.7–5.5)
SODIUM SERPL-SCNC: 138 MMOL/L (ref 136–145)
STATUS OF UNIT,%ST: NORMAL
STATUS OF UNIT,%ST: NORMAL
UNIT DIVISION, %UDIV: 0
UNIT DIVISION, %UDIV: 0
WBC # BLD AUTO: 6.6 K/UL (ref 4.6–13.2)

## 2019-04-27 PROCEDURE — 82962 GLUCOSE BLOOD TEST: CPT

## 2019-04-27 PROCEDURE — 74011250636 HC RX REV CODE- 250/636: Performed by: FAMILY MEDICINE

## 2019-04-27 PROCEDURE — 51798 US URINE CAPACITY MEASURE: CPT

## 2019-04-27 PROCEDURE — 80048 BASIC METABOLIC PNL TOTAL CA: CPT

## 2019-04-27 PROCEDURE — 74011250636 HC RX REV CODE- 250/636: Performed by: PHYSICIAN ASSISTANT

## 2019-04-27 PROCEDURE — 74011250636 HC RX REV CODE- 250/636: Performed by: ORTHOPAEDIC SURGERY

## 2019-04-27 PROCEDURE — 65270000029 HC RM PRIVATE

## 2019-04-27 PROCEDURE — 74011250636 HC RX REV CODE- 250/636: Performed by: HOSPITALIST

## 2019-04-27 PROCEDURE — 74011250637 HC RX REV CODE- 250/637: Performed by: ORTHOPAEDIC SURGERY

## 2019-04-27 PROCEDURE — 74011636637 HC RX REV CODE- 636/637: Performed by: FAMILY MEDICINE

## 2019-04-27 PROCEDURE — 85027 COMPLETE CBC AUTOMATED: CPT

## 2019-04-27 PROCEDURE — 85610 PROTHROMBIN TIME: CPT

## 2019-04-27 PROCEDURE — 77010033678 HC OXYGEN DAILY

## 2019-04-27 PROCEDURE — 93005 ELECTROCARDIOGRAM TRACING: CPT

## 2019-04-27 PROCEDURE — 74011250637 HC RX REV CODE- 250/637: Performed by: FAMILY MEDICINE

## 2019-04-27 PROCEDURE — 36415 COLL VENOUS BLD VENIPUNCTURE: CPT

## 2019-04-27 RX ORDER — LOVASTATIN 20 MG/1
40 TABLET ORAL DAILY
Status: DISCONTINUED | OUTPATIENT
Start: 2019-04-28 | End: 2019-04-29 | Stop reason: HOSPADM

## 2019-04-27 RX ORDER — SODIUM CHLORIDE 9 MG/ML
100 INJECTION, SOLUTION INTRAVENOUS CONTINUOUS
Status: DISCONTINUED | OUTPATIENT
Start: 2019-04-27 | End: 2019-04-29 | Stop reason: HOSPADM

## 2019-04-27 RX ORDER — WARFARIN 2 MG/1
2 TABLET ORAL ONCE
Status: DISCONTINUED | OUTPATIENT
Start: 2019-04-27 | End: 2019-04-27

## 2019-04-27 RX ADMIN — FAMOTIDINE 20 MG: 20 TABLET ORAL at 09:06

## 2019-04-27 RX ADMIN — CEFAZOLIN 2 G: 10 INJECTION, POWDER, FOR SOLUTION INTRAVENOUS at 09:06

## 2019-04-27 RX ADMIN — INSULIN LISPRO 2 UNITS: 100 INJECTION, SOLUTION INTRAVENOUS; SUBCUTANEOUS at 12:40

## 2019-04-27 RX ADMIN — SIMVASTATIN 40 MG: 40 TABLET, FILM COATED ORAL at 09:08

## 2019-04-27 RX ADMIN — VITAMIN D, TAB 1000IU (100/BT) 1000 UNITS: 25 TAB at 09:03

## 2019-04-27 RX ADMIN — ACETAMINOPHEN 650 MG: 325 TABLET, FILM COATED ORAL at 06:16

## 2019-04-27 RX ADMIN — WARFARIN SODIUM 2.5 MG: 2 TABLET ORAL at 22:10

## 2019-04-27 RX ADMIN — Medication 10 ML: at 06:17

## 2019-04-27 RX ADMIN — INSULIN LISPRO 4 UNITS: 100 INJECTION, SOLUTION INTRAVENOUS; SUBCUTANEOUS at 09:04

## 2019-04-27 RX ADMIN — ACETAMINOPHEN 650 MG: 325 TABLET, FILM COATED ORAL at 00:52

## 2019-04-27 RX ADMIN — WARFARIN SODIUM 2.5 MG: 2 TABLET ORAL at 00:53

## 2019-04-27 RX ADMIN — SODIUM CHLORIDE 100 ML/HR: 900 INJECTION, SOLUTION INTRAVENOUS at 22:19

## 2019-04-27 RX ADMIN — ACETAMINOPHEN 650 MG: 325 TABLET, FILM COATED ORAL at 19:51

## 2019-04-27 RX ADMIN — INSULIN LISPRO 2 UNITS: 100 INJECTION, SOLUTION INTRAVENOUS; SUBCUTANEOUS at 22:18

## 2019-04-27 RX ADMIN — SODIUM CHLORIDE 75 ML/HR: 900 INJECTION, SOLUTION INTRAVENOUS at 11:29

## 2019-04-27 RX ADMIN — ACETAMINOPHEN 650 MG: 325 TABLET, FILM COATED ORAL at 12:47

## 2019-04-27 RX ADMIN — KETOROLAC TROMETHAMINE 15 MG: 30 INJECTION, SOLUTION INTRAMUSCULAR at 04:25

## 2019-04-27 RX ADMIN — CEFAZOLIN 2 G: 10 INJECTION, POWDER, FOR SOLUTION INTRAVENOUS at 00:54

## 2019-04-27 RX ADMIN — Medication 10 ML: at 19:54

## 2019-04-27 NOTE — PROGRESS NOTES
Medicine Progress Note Patient: Steff Fitch   Age:  80 y.o. 
DOA: 4/26/2019   Admit Dx / CC: Hip fracture ([facility] [address] [facility] Elvia Peña Closed right hip fracture, initial encounter ([facility] Gemma Abad LOS:  LOS: 1 day Assessment/Plan Principal Problem: 
  Closed right hip fracture, initial encounter ([facility] [address] [facility] (4/26/2019) Active Problems: 
  Atrial fibrillation ([facility] (4/26/2019) HTN (hypertension) (4/26/2019) Additional Plan notes 1)  Closed hip fracture - s/p surgery last night,  PT per ortho, pain meds 2)  HTN 
 - continue lisinopril and statin DISPO Anticipated Date of Discharge: per ortho Anticipated Disposition (home, SNF) : home vs snf Subjective:  
Patient seen and examined. No complaints of pain this am 
 
Objective:  
 
Visit Vitals BP 96/53 (BP 1 Location: Left arm, BP Patient Position: At rest) Pulse (!) 55 Temp 97.7 °F (36.5 °C) Resp 15 Ht 5' 6\" (1.676 m) Wt 63.5 kg (140 lb) SpO2 93% BMI 22.60 kg/m² Physical Exam 
General appearance: alert, cooperative, no distress, appears stated age Head: Normocephalic, without obvious abnormality, atraumatic Neck: supple, trachea midline Lungs: clear to auscultation bilaterally Heart: regular rate and rhythm, S1, S2 normal, no murmur, click, rub or gallop Abdomen: soft, non-tender. Bowel sounds normal. No masses,  no organomegaly Extremities: extremities normal, atraumatic, no cyanosis or edema Skin: Skin color, texture, turgor normal. No rashes or lesions Intake and Output: 
Current Shift:  No intake/output data recorded. Last three shifts:  04/25 1901 - 04/27 0700 In: 1818.3 [P.O.:260; I.V.:1558.3] Out: 905 [Urine:880] Lab/Data Reviewed: 
CMP:  
Lab Results Component Value Date/Time   04/27/2019 04:10 AM  
 K 4.6 04/27/2019 04:10 AM  
  04/27/2019 04:10 AM  
 CO2 27 04/27/2019 04:10 AM  
 AGAP 7 04/27/2019 04:10 AM  
  (H) 04/27/2019 04:10 AM  
 BUN 34 (H) 04/27/2019 04:10 AM  
 CREA 1.43 (H) 04/27/2019 04:10 AM  
 GFRAA 57 (L) 04/27/2019 04:10 AM  
 GFRNA 47 (L) 04/27/2019 04:10 AM  
 CA 7.5 (L) 04/27/2019 04:10 AM  
 
CBC:  
Lab Results Component Value Date/Time WBC 6.6 04/27/2019 04:10 AM  
 HGB 10.4 (L) 04/27/2019 04:10 AM  
 HCT 32.1 (L) 04/27/2019 04:10 AM  
  04/27/2019 04:10 AM  
 
All Cardiac Markers in the last 24 hours: No results found for: CPK, CK, CKMMB, CKMB, RCK3, CKMBT, CKNDX, CKND1, KANDACE, TROPT, TROIQ, KIM, TROPT, TNIPOC, BNP, BNPP Medications Reviewed: 
Current Facility-Administered Medications Medication Dose Route Frequency  0.9% sodium chloride infusion  75 mL/hr IntraVENous CONTINUOUS  cholecalciferol (VITAMIN D3) tablet 1,000 Units  1,000 Units Oral DAILY  lisinopril (PRINIVIL, ZESTRIL) tablet 10 mg  10 mg Oral DAILY  simvastatin (ZOCOR) tablet 40 mg  40 mg Oral DAILY  famotidine (PEPCID) tablet 20 mg  20 mg Oral DAILY  lactated Ringers infusion  100 mL/hr IntraVENous CONTINUOUS  
 acetaminophen (TYLENOL) tablet 650 mg  650 mg Oral Q4H PRN  
 ketorolac (TORADOL) injection 15 mg  15 mg IntraVENous Q6H PRN  
 morphine injection 2 mg  2 mg IntraVENous Q4H PRN  
 insulin lispro (HUMALOG) injection   SubCUTAneous AC&HS  
 glucose chewable tablet 16 g  4 Tab Oral PRN  
 glucagon (GLUCAGEN) injection 1 mg  1 mg IntraMUSCular PRN  
 dextrose (D50) infusion 12.5-25 g  25-50 mL IntraVENous PRN  
 0.9% sodium chloride infusion 250 mL  250 mL IntraVENous PRN  
 sodium chloride (NS) flush 5-40 mL  5-40 mL IntraVENous Q8H  
 sodium chloride (NS) flush 5-40 mL  5-40 mL IntraVENous PRN  
 acetaminophen (TYLENOL) tablet 650 mg  650 mg Oral Q6H  
 oxyCODONE-acetaminophen (PERCOCET) 5-325 mg per tablet 1-2 Tab  1-2 Tab Oral Q4H PRN  
 ondansetron (ZOFRAN) injection 4 mg  4 mg IntraVENous Q4H PRN  
 WARFARIN INFORMATION NOTE (COUMADIN)   Other PRN Sergio Bazzi MD 
 
April 27, 2019

## 2019-04-27 NOTE — PROGRESS NOTES
PT orders received and chart reviewed. 1st Attempt 1115 HR in 45s. 2nd Attempt 1540 HR <50 bpm. Secondary to low HR with both attempts at PT evaluation, mobility to be held at this time. Will f/u with patient on 4/28/19.  
  
Oz Cerna PT, DPT Office extension: [phone]

## 2019-04-27 NOTE — PROGRESS NOTES
Lester Proc. Dunn Juan 1 with Dr. Cassandra Angulo and advised him of patients fluctuating HR dropping btwn 43 and 35. Received verbal order to perform EKG if it continues. Pt is resting comfortably, no distress noted.  
  
1125 - Telemetry called to advise of pt's low HR, no pause <2.7, couple of PVC's 
  
1157 - performed EKG, will advise Dr. Cassandra Angulo 1435 - pt has only voided 100cc since 0600, states he does not have any urge, will perform bladder scan. Abdomen is distended, but soft. Pt able to tolerate food, no c/o nausea/vomiting.  
 
1455 - bladder scan 28cc 1520 - Spoke with SKYLAR Stark per low urine o/p, and abdominal distention, advised that he will place orders for increased fluids. 1627 - pt's BS = 64; pt given juice 1655 - pt resting in bed. No urge to urinate. Wife at bedside. 1942 - pt had a large urine unmeasurable urine o/p, missed urinal. Pt bladder scanned with 101cc afterwards.

## 2019-04-27 NOTE — PERIOP NOTES
Recd care of pt from OR via bed. Resp even and unlabored. Attached to monitor. VSS. OR, MAR and anesthesia report acknowledged. Will cont to monitor. 1915  Accu check 92. No coverage noted. 2010  TRANSFER - OUT REPORT: 
 
Verbal report given to Osmar Matos RN (name) on Diomedes Diaz  being transferred to 2200 (unit) for routine post - op Report consisted of patients Situation, Background, Assessment and  
Recommendations(SBAR). Information from the following report(s) SBAR, OR Summary, MAR, Recent Results and Cardiac Rhythm atrial fib was reviewed with the receiving nurse. Lines:  
Peripheral IV 04/26/19 Right Antecubital (Active) Site Assessment Clean, dry, & intact 4/26/2019  7:30 PM  
Phlebitis Assessment 0 4/26/2019  7:30 PM  
Infiltration Assessment 0 4/26/2019  7:30 PM  
Dressing Status Clean, dry, & intact 4/26/2019  7:30 PM  
Dressing Type Transparent;Tape 4/26/2019  7:30 PM  
Hub Color/Line Status Pink; Infusing 4/26/2019  7:30 PM  
Action Taken Open ports on tubing capped 4/26/2019  7:30 PM  
Alcohol Cap Used Yes 4/26/2019  7:30 PM  
  
 
Opportunity for questions and clarification was provided. Patient transported with: 
 Registered Nurse Tech

## 2019-04-27 NOTE — PROGRESS NOTES
Progress Note Post Operative Day: 1 Assessment: 1. Status post right hip percutaneous pinning, Progressing. PLAN:   
1. Mobilize. Continue P.T. 
2. TTWB 3. DVT prophylaxis per IM, OK to resume coumadin 4. Discharge Planning. HPI: Latisha Foster is a 80 y.o. male patient without new complaints status. No new orthopaedic changes. Blood pressure 96/53, pulse (!) 55, temperature 97.7 °F (36.5 °C), resp. rate 15, height 5' 6\" (1.676 m), weight 63.5 kg (140 lb), SpO2 93 %. CBC w/Diff Lab Results Component Value Date/Time WBC 6.6 04/27/2019 04:10 AM  
 RBC 3.74 (L) 04/27/2019 04:10 AM  
 HGB 10.4 (L) 04/27/2019 04:10 AM  
 HCT 32.1 (L) 04/27/2019 04:10 AM  
 MCV 85.8 04/27/2019 04:10 AM  
 MCH 27.8 04/27/2019 04:10 AM  
 MCHC 32.4 04/27/2019 04:10 AM  
 RDW 14.8 (H) 04/27/2019 04:10 AM  
 Lab Results Component Value Date/Time MONOS 8 04/26/2019 05:44 AM  
 EOS 2 04/26/2019 05:44 AM  
 BASOS 0 04/26/2019 05:44 AM  
 RDW 14.8 (H) 04/27/2019 04:10 AM  
  
  
 
 
Physical Assessment: 
General: in no apparent distress, well developed and well nourished, non-toxic, in no respiratory distress and acyanotic, alert and oriented times 3 Wound: clean, dry, no drainage Extremities:  Neurovascular intact RLE. Compartments soft and NT distal to surgical site. Able to contract quadriceps. Plantar and dorsiflexion intact. Palpable DP/PT pulses noted. Denies paresthesias Dressing:  CDI  
DVT Exam: No exam evidence to suggest DVT. Compartments soft and NT. Radiology: no new ortho studies - Cathy Hartmann PA-C 
4/27/2019 Office [phone]

## 2019-04-27 NOTE — PROGRESS NOTES
Pt returned to room 2219 from PACU, a&o pain sore but tolerable at this time spouse at the bedside call bell and urinal within reach will continue to monitor. Eddie Alcazar soundly NAD noted call bell and urinal within reach will continue to monitor. 4761 Report from tele pt with pause x2 MD Valdez made aware no new order at this time will monitor closely for further change. Bedside and Verbal shift change report given to [facility], RN (oncoming nurse) by Bailey Chandler RN (offgoing nurse). Report included the following information SBAR, Kardex, Intake/Output, MAR and Recent Results.

## 2019-04-27 NOTE — PROGRESS NOTES
Problem: Falls - Risk of 
Goal: *Absence of Falls Description Document Edgar Brunner Fall Risk and appropriate interventions in the flowsheet. Outcome: Progressing Towards Goal 
  
Problem: Patient Education: Go to Patient Education Activity Goal: Patient/Family Education Outcome: Progressing Towards Goal 
  
Problem: Patient Education: Go to Patient Education Activity Goal: Patient/Family Education Outcome: Progressing Towards Goal 
  
Problem: Nutrition Deficit Goal: *Optimize nutritional status Outcome: Progressing Towards Goal 
  
Problem: Discharge Planning Goal: *Discharge to safe environment Outcome: Progressing Towards Goal 
  
Problem: Pain Goal: *Control of Pain Outcome: Progressing Towards Goal 
  
Problem: Patient Education: Go to Patient Education Activity Goal: Patient/Family Education Outcome: Progressing Towards Goal

## 2019-04-27 NOTE — OP NOTES
[facility]  OPERATIVE REPORT    Name:  Albert Damon  MR#:   [id number]  :  1931  ACCOUNT #:  [de-identified]  DATE OF SERVICE:  2019    PREOPERATIVE DIAGNOSIS:  Closed impacted right subcapital femoral fracture. POSTOPERATIVE DIAGNOSIS:  Closed impacted right subcapital femoral fracture. PROCEDURE PERFORMED:  Percutaneous pinning of right subcapital femoral fracture using three 6.5 mm Synthes cannulated Judd screws. SURGEON:  Lara Yeboah MD    ASSISTANT:  Tamia Warren. ANESTHESIA:  General.    COMPLICATIONS:  None. SPECIMENS REMOVED:  None. IMPLANTS:  00    ESTIMATED BLOOD LOSS:  25 mL. FINDINGS:  The patient had an impacted closed right subcapital femur fracture with minimal angulation in both planes. PROCEDURE:  Under general anesthesia, the patient was placed on a fracture table taking care to avoid displacement of the fracture. Peripheral nerves padded. Right lateral thigh prepped and draped in a sterile fashion. Utilizing AP and lateral C-arm control a midlateral incision was made in appropriate location followed by placement of 3 guidewires and then over-drilling and placement of [place] 6.5 mm short thread screws. AP and lateral radiographs showed fracture position and screw placement satisfactory. Guide pins removed. Wound copiously irrigated and closed in layers with powdered vancomycin in deep subcutaneous tissue. Wound dressed. The patient awakened and taken to the recovery room in satisfactory condition without complication. Estimated blood loss 25 mL. The patient received 308 mL of crystalloid fluid. No specimens. No tubes or drains. Needle and sponge counts were correct without complication. At the time of dictation, the patient was not awakened sufficiently to test motor sensation.         Jimenez Merida III, MD    [id number]  D:  2019 18:49  T:  2019 23:49  JOB #:  [id number]  CC:  ROXANNE Villagomez MD

## 2019-04-28 LAB
ATRIAL RATE: 50 BPM
CALCULATED R AXIS, ECG10: -28 DEGREES
CALCULATED T AXIS, ECG11: -5 DEGREES
DIAGNOSIS, 93000: NORMAL
GLUCOSE BLD STRIP.AUTO-MCNC: 135 MG/DL (ref 70–110)
GLUCOSE BLD STRIP.AUTO-MCNC: 152 MG/DL (ref 70–110)
GLUCOSE BLD STRIP.AUTO-MCNC: 153 MG/DL (ref 70–110)
GLUCOSE BLD STRIP.AUTO-MCNC: 227 MG/DL (ref 70–110)
HCT VFR BLD AUTO: 30.7 % (ref 36–48)
HGB BLD-MCNC: 10 G/DL (ref 13–16)
INR PPP: 3 (ref 0.8–1.2)
PROTHROMBIN TIME: 31.3 SEC (ref 11.5–15.2)
Q-T INTERVAL, ECG07: 486 MS
QRS DURATION, ECG06: 100 MS
QTC CALCULATION (BEZET), ECG08: 439 MS
VENTRICULAR RATE, ECG03: 49 BPM

## 2019-04-28 PROCEDURE — 36415 COLL VENOUS BLD VENIPUNCTURE: CPT

## 2019-04-28 PROCEDURE — 65270000029 HC RM PRIVATE

## 2019-04-28 PROCEDURE — 97162 PT EVAL MOD COMPLEX 30 MIN: CPT

## 2019-04-28 PROCEDURE — 82962 GLUCOSE BLOOD TEST: CPT

## 2019-04-28 PROCEDURE — 77010033678 HC OXYGEN DAILY

## 2019-04-28 PROCEDURE — 74011636637 HC RX REV CODE- 636/637: Performed by: FAMILY MEDICINE

## 2019-04-28 PROCEDURE — 74011250637 HC RX REV CODE- 250/637: Performed by: ORTHOPAEDIC SURGERY

## 2019-04-28 PROCEDURE — 74011250637 HC RX REV CODE- 250/637: Performed by: HOSPITALIST

## 2019-04-28 PROCEDURE — 85018 HEMOGLOBIN: CPT

## 2019-04-28 PROCEDURE — 74011250636 HC RX REV CODE- 250/636: Performed by: PHYSICIAN ASSISTANT

## 2019-04-28 PROCEDURE — 85610 PROTHROMBIN TIME: CPT

## 2019-04-28 PROCEDURE — 97530 THERAPEUTIC ACTIVITIES: CPT

## 2019-04-28 PROCEDURE — 74011250637 HC RX REV CODE- 250/637: Performed by: FAMILY MEDICINE

## 2019-04-28 RX ORDER — WARFARIN 2 MG/1
2 TABLET ORAL DAILY
Status: DISCONTINUED | OUTPATIENT
Start: 2019-04-28 | End: 2019-04-29 | Stop reason: HOSPADM

## 2019-04-28 RX ADMIN — LOVASTATIN 40 MG: 20 TABLET ORAL at 09:48

## 2019-04-28 RX ADMIN — ACETAMINOPHEN 650 MG: 325 TABLET, FILM COATED ORAL at 12:54

## 2019-04-28 RX ADMIN — WARFARIN SODIUM 2 MG: 2 TABLET ORAL at 18:35

## 2019-04-28 RX ADMIN — LISINOPRIL 10 MG: 10 TABLET ORAL at 09:44

## 2019-04-28 RX ADMIN — Medication 10 ML: at 14:00

## 2019-04-28 RX ADMIN — INSULIN LISPRO 2 UNITS: 100 INJECTION, SOLUTION INTRAVENOUS; SUBCUTANEOUS at 08:01

## 2019-04-28 RX ADMIN — ACETAMINOPHEN 650 MG: 325 TABLET, FILM COATED ORAL at 23:44

## 2019-04-28 RX ADMIN — FAMOTIDINE 20 MG: 20 TABLET ORAL at 09:44

## 2019-04-28 RX ADMIN — ACETAMINOPHEN 650 MG: 325 TABLET, FILM COATED ORAL at 00:21

## 2019-04-28 RX ADMIN — INSULIN LISPRO 4 UNITS: 100 INJECTION, SOLUTION INTRAVENOUS; SUBCUTANEOUS at 12:00

## 2019-04-28 RX ADMIN — SODIUM CHLORIDE 100 ML/HR: 900 INJECTION, SOLUTION INTRAVENOUS at 17:11

## 2019-04-28 RX ADMIN — ACETAMINOPHEN 650 MG: 325 TABLET, FILM COATED ORAL at 05:52

## 2019-04-28 RX ADMIN — INSULIN LISPRO 2 UNITS: 100 INJECTION, SOLUTION INTRAVENOUS; SUBCUTANEOUS at 16:30

## 2019-04-28 RX ADMIN — VITAMIN D, TAB 1000IU (100/BT) 1000 UNITS: 25 TAB at 09:44

## 2019-04-28 RX ADMIN — SODIUM CHLORIDE 100 ML/HR: 900 INJECTION, SOLUTION INTRAVENOUS at 07:02

## 2019-04-28 NOTE — PROGRESS NOTES
Assumed care of patient, patint in bed eating. No c/o pain at this time. Call light within reach. Sbar report received from [facility] 2  
 
 
3846: Patient refused tylenol due to no pain at this time. 1915:   Bedside / verbal shift change report given to  Poornima Jackson (oncoming nurse) by Angela Christy RN (offgoing nurse). Report included the following information SBAR, Kardex, Intake/Output, MAR and Recent Results.

## 2019-04-28 NOTE — PROGRESS NOTES
Problem: Falls - Risk of 
Goal: *Absence of Falls Description Document Erma Spann Fall Risk and appropriate interventions in the flowsheet. Outcome: Progressing Towards Goal 
  
Problem: Patient Education: Go to Patient Education Activity Goal: Patient/Family Education Outcome: Progressing Towards Goal 
  
Problem: Diabetes Self-Management Goal: *Disease process and treatment process Description Define diabetes and identify own type of diabetes; list 3 options for treating diabetes. Outcome: Progressing Towards Goal 
Goal: *Incorporating nutritional management into lifestyle Description Describe effect of type, amount and timing of food on blood glucose; list 3 methods for planning meals. Outcome: Progressing Towards Goal 
Goal: *Incorporating physical activity into lifestyle Description State effect of exercise on blood glucose levels. Outcome: Progressing Towards Goal 
Goal: *Developing strategies to promote health/change behavior Description Define the ABC's of diabetes; identify appropriate screenings, schedule and personal plan for screenings. Outcome: Progressing Towards Goal 
Goal: *Using medications safely Description State effect of diabetes medications on diabetes; name diabetes medication taking, action and side effects. Outcome: Progressing Towards Goal 
Goal: *Monitoring blood glucose, interpreting and using results Description Identify recommended blood glucose targets  and personal targets. Outcome: Progressing Towards Goal 
Goal: *Prevention, detection, treatment of acute complications Description List symptoms of hyper- and hypoglycemia; describe how to treat low blood sugar and actions for lowering  high blood glucose level. Outcome: Progressing Towards Goal 
Goal: *Prevention, detection and treatment of chronic complications Description Define the natural course of diabetes and describe the relationship of blood glucose levels to long term complications of diabetes. Outcome: Progressing Towards Goal 
Goal: *Developing strategies to address psychosocial issues Description Describe feelings about living with diabetes; identify support needed and support network Outcome: Progressing Towards Goal 
Goal: *Insulin pump training Outcome: Progressing Towards Goal 
Goal: *Sick day guidelines Outcome: Progressing Towards Goal 
Goal: *Patient Specific Goal (EDIT GOAL, INSERT TEXT) Outcome: Progressing Towards Goal 
  
Problem: Patient Education: Go to Patient Education Activity Goal: Patient/Family Education Outcome: Progressing Towards Goal 
  
Problem: Nutrition Deficit Goal: *Optimize nutritional status Outcome: Progressing Towards Goal 
  
Problem: Discharge Planning Goal: *Discharge to safe environment Outcome: Progressing Towards Goal 
  
Problem: Pain Goal: *Control of Pain Outcome: Progressing Towards Goal 
  
Problem: Patient Education: Go to Patient Education Activity Goal: Patient/Family Education Outcome: Progressing Towards Goal 
  
Problem: Pressure Injury - Risk of 
Goal: *Prevention of pressure injury Description Document Odilon Scale and appropriate interventions in the flowsheet. Outcome: Progressing Towards Goal 
  
Problem: Patient Education: Go to Patient Education Activity Goal: Patient/Family Education Outcome: Progressing Towards Goal 
  
Problem: Patient Education: Go to Patient Education Activity Goal: Patient/Family Education Outcome: Progressing Towards Goal

## 2019-04-28 NOTE — PROGRESS NOTES
Progress Note Post Operative Day: 2 Assessment: 1. Status post right hip percutaneous pinning, Progressing. 
  
PLAN:   
1. Mobilize. Continue P.T. 
2. TTWB 3. DVT prophylaxis per IM, coumadin 4. Discharge Planning HPI: Clover Kelly is a 80 y.o. male patient without new complaints. No new orthopaedic changes. Blood pressure 129/65, pulse 65, temperature 97.7 °F (36.5 °C), resp. rate 16, height 5' 6\" (1.676 m), weight 63.5 kg (140 lb), SpO2 93 %. CBC w/Diff Lab Results Component Value Date/Time WBC 6.6 04/27/2019 04:10 AM  
 RBC 3.74 (L) 04/27/2019 04:10 AM  
 HGB 10.0 (L) 04/28/2019 04:20 AM  
 HCT 30.7 (L) 04/28/2019 04:20 AM  
 MCV 85.8 04/27/2019 04:10 AM  
 MCH 27.8 04/27/2019 04:10 AM  
 MCHC 32.4 04/27/2019 04:10 AM  
 RDW 14.8 (H) 04/27/2019 04:10 AM  
 Lab Results Component Value Date/Time MONOS 8 04/26/2019 05:44 AM  
 EOS 2 04/26/2019 05:44 AM  
 BASOS 0 04/26/2019 05:44 AM  
 RDW 14.8 (H) 04/27/2019 04:10 AM  
  
  
 
 
Physical Assessment: 
General: in no apparent distress, well developed and well nourished, non-toxic, in no respiratory distress and acyanotic, alert and oriented times 3 Wound: no drainage Extremities:  Neurovascular intact RLE. Compartments soft and NT distal to surgical  site. Able to contract quadriceps. Plantar and dorsiflexion intact. Palpable DP/PT pulses noted. Denies paresthesias Dressing:  CDI  
DVT Exam:   No exam evidence to suggest DVT. Compartments soft and NT. Radiology: no ortho studies - Cathy Cordero PA-C 
4/28/2019 Office [phone]

## 2019-04-28 NOTE — ROUTINE PROCESS
Dr. Thao Moran called order given to continue pt coumadin Order noted by Dr. Eunice Pitts for paramiters for coumadin dosing Spoke to [facility] in pharmacy, [facility] spoke with Elaine CHENG order given to resume pt coumadin

## 2019-04-28 NOTE — ROUTINE PROCESS
Verbal shift change report given to Josh RN (oncoming nurse) by Gavin Mcdonough RN (offgoing nurse). Report included the following information SBAR, OR Summary, Procedure Summary, Intake/Output, MAR, Recent Results and Med Rec Status.

## 2019-04-28 NOTE — ROUTINE PROCESS
1940 Pt in bed, bed pad wet with large amount of urine, pt missed urinal, checked with bladder scanner by Nallely Torres  ml of urine showed 2240 Pt in bed bed pad wet, skin care given 
2300 Pt checked with bladder scanner, 87 ml of urine showed 0015 Pt voided 100 ml clear yellow urine in urinal spilled some urine in bed, bed pad changed. Pt denies pain right foot warm toes warm and mobile, pedal pulse palpable. Taking po fluids well, call bell and urinal in pt reach 
0330 Pt awake voided 200 ml of urine in urinal, spilled some urine in bed, bed pad changed made comfortable 0545 Pt in bed assisted with urinal voided 200 ml of urine. Made comfortable in bed. Tylenol given as ordered

## 2019-04-28 NOTE — PROGRESS NOTES
04/28/19 patient ans wife agreable to post to [facility], [facility]. and [facility] snf in addition to [facility] they already agreed to. [facility], [facility], [facility]- posted in [facility]. Annia Tirado. Otoniel Zelaya RN, BSN [facility] [phone], Pager [phone]

## 2019-04-28 NOTE — PROGRESS NOTES
Medicine Progress Note Patient: Rigo Mora   Age:  80 y.o. 
DOA: 4/26/2019   Admit Dx / CC: Hip fracture ([facility] Ciarra Rios Closed right hip fracture, initial encounter ([facility] Chaparro Olivia LOS:  LOS: 2 days Assessment/Plan Principal Problem: 
  Closed right hip fracture, initial encounter ([facility] (4/26/2019) Active Problems: 
  Atrial fibrillation ([facility] (4/26/2019) HTN (hypertension) (4/26/2019) Additional Plan notes 1)  Closed hip fracture - s/p surgery last night,  PT per ortho, pain meds 2)  HTN 
 - continue lisinopril and statin 3)  Anticoagulation 
 - decrease coumadin dosing DISPO Anticipated Date of Discharge: pt / ot , suspect snf tuesday Subjective:  
Patient seen and examined. In good spirits. PT/OT pending Objective:  
 
Visit Vitals /65 (BP 1 Location: Right arm, BP Patient Position: At rest) Pulse 65 Temp 97.7 °F (36.5 °C) Resp 16 Ht 5' 6\" (1.676 m) Wt 63.5 kg (140 lb) SpO2 93% BMI 22.60 kg/m² Physical Exam 
General appearance: alert, cooperative, no distress, appears stated age Head: Normocephalic, without obvious abnormality, atraumatic Neck: supple, trachea midline Lungs: clear to auscultation bilaterally Heart: regular rate and rhythm, S1, S2 normal, no murmur, click, rub or gallop Abdomen: soft, non-tender. Bowel sounds normal. No masses,  no organomegaly Extremities: extremities normal, atraumatic, no cyanosis or edema Skin: Skin color, texture, turgor normal. No rashes or lesions Intake and Output: 
Current Shift:  No intake/output data recorded. Last three shifts:  04/26 1901 - 04/28 0700 In: 2873.3 [P.O.:440; I.V.:2433.3] Out: 1420 [[id number]] Lab/Data Reviewed: 
CMP:  
No results found for: NA, K, CL, CO2, AGAP, GLU, BUN, CREA, GFRAA, GFRNA, CA, MG, PHOS, ALB, TBIL, TP, ALB, GLOB, AGRAT, SGOT, ALT, GPT 
CBC:  
Lab Results Component Value Date/Time  HGB 10.0 (L) 04/28/2019 04:20 AM  
 HCT 30.7 (L) 04/28/2019 04:20 AM  
 
All Cardiac Markers in the last 24 hours: No results found for: CPK, CK, CKMMB, CKMB, RCK3, CKMBT, CKNDX, CKND1, KANDACE, TROPT, TROIQ, KIM, TROPT, TNIPOC, BNP, BNPP Medications Reviewed: 
Current Facility-Administered Medications Medication Dose Route Frequency  warfarin (COUMADIN) tablet 2 mg  2 mg Oral DAILY  0.9% sodium chloride infusion  100 mL/hr IntraVENous CONTINUOUS  
 lovastatin (MEVACOR) tablet 40 mg  40 mg Oral DAILY  cholecalciferol (VITAMIN D3) tablet 1,000 Units  1,000 Units Oral DAILY  lisinopril (PRINIVIL, ZESTRIL) tablet 10 mg  10 mg Oral DAILY  famotidine (PEPCID) tablet 20 mg  20 mg Oral DAILY  acetaminophen (TYLENOL) tablet 650 mg  650 mg Oral Q4H PRN  
 ketorolac (TORADOL) injection 15 mg  15 mg IntraVENous Q6H PRN  
 morphine injection 2 mg  2 mg IntraVENous Q4H PRN  
 insulin lispro (HUMALOG) injection   SubCUTAneous AC&HS  
 glucose chewable tablet 16 g  4 Tab Oral PRN  
 glucagon (GLUCAGEN) injection 1 mg  1 mg IntraMUSCular PRN  
 dextrose (D50) infusion 12.5-25 g  25-50 mL IntraVENous PRN  
 0.9% sodium chloride infusion 250 mL  250 mL IntraVENous PRN  
 sodium chloride (NS) flush 5-40 mL  5-40 mL IntraVENous Q8H  
 sodium chloride (NS) flush 5-40 mL  5-40 mL IntraVENous PRN  
 acetaminophen (TYLENOL) tablet 650 mg  650 mg Oral Q6H  
 oxyCODONE-acetaminophen (PERCOCET) 5-325 mg per tablet 1-2 Tab  1-2 Tab Oral Q4H PRN  
 ondansetron (ZOFRAN) injection 4 mg  4 mg IntraVENous Q4H PRN  
 WARFARIN INFORMATION NOTE (COUMADIN)   Other PRN Karey Engel MD 
 
April 28, 2019

## 2019-04-28 NOTE — PROGRESS NOTES
met with Patient completed the initial Spiritual Assessment of the patient, and offered Pastoral Care, see flow sheets for interventions. Patient does not have any Synagogue/cultural needs that will affect patients preferences in health care. Charted reviewed. Chaplains will continue to follow and will provide pastoral care on an as needed/requested basis. [id number] [phone]

## 2019-04-28 NOTE — PROGRESS NOTES
Problem: Mobility Impaired (Adult and Pediatric) Goal: *Acute Goals and Plan of Care (Insert Text) Description Physical Therapy Goals Initiated 4/28/2019 and to be accomplished within 7 day(s) 1. Patient will move from supine to sit and sit to supine in bed with moderate assistance  in order to facilitate eating meals in sitting. 2.  Patient will perform sit to stand with moderate assistance  in order to prepare for standing ADLs. 3.  Patient will ambulate with minimal assistance/contact guard assist for >/25 feet with the least restrictive device in order to facilitate improved ease with ambulation to the restroom. 4.  Patient will ascend/descend 2 stairs with handrail(s) with moderate assistance  in order to prepare patient to safely enter residence. 5.  Patient will roll side to side in bed with supervision/set-up in order to prepare for OOB activity. Outcome: Progressing Towards Goal 
PHYSICAL THERAPY EVALUATION Patient: Sj Gonzalez (15 y.o. male) Date: 4/28/2019 Primary Diagnosis: Hip fracture ([facility] Gita Odor Closed right hip fracture, initial encounter ([facility] David Antoine Procedure(s) (LRB): 
right hip percutaneous pinning (Left) 2 Days Post-Op Precautions: R TTWB    
 
PLOF: Independent ambulation ASSESSMENT : 
Patient is a 80 y.o. male who presents to Physical Therapy with diagnosis of Hip fracture ([facility] Gita Odor Closed right hip fracture, initial encounter ([facility] [S72.001A]. Patient is supine in bed and agrees to participate in PT services. Upon objective evaluation, patient demonstrated decreased R CATARINA LE AROM in all directions, impaired R LE strength in all directions except knee flexion,and decreased sitting/standing static and dynamic balance. Patient ambulated 2 ft with R TTWB and required Min/Mod A for safety. Patient requires between maximal assistance and minimal assistance/contact guard assist for bed mobility, transfers and ambulation.  VCing for R LE TTWB restrictions t/o transfers and ambulation. Patient can benefit from skilled PT interventions to decrease r/o falls and improve CATARINA LE strength, increase walking/standing tolerance, and improve body mechanics mechanics with bed mobility and transfers to facilitate ADL's & overall functional status/quality of life. Patient will benefit from skilled intervention to address the above impairments. Patient's rehabilitation potential is considered to be Good Factors which may influence rehabilitation potential include:  
? None noted ? Mental ability/status ? Medical condition ? Home/family situation and support systems ? Safety awareness 
? Pain tolerance/management 
? Other: PLAN : 
Recommendations and Planned Interventions:  
?           Bed Mobility Training             ? Neuromuscular Re-Education ? Transfer Training                   ? Orthotic/Prosthetic Training 
? Gait Training                          ? Modalities ? Therapeutic Exercises           ? Edema Management/Control ? Therapeutic Activities            ? Family Training/Education ? Patient Education ? Other (comment): Frequency/Duration: Patient will be followed by physical therapy BID 4-7x per week to address goals. Discharge Recommendations: Home Health Further Equipment Recommendations for Discharge: rolling walker SUBJECTIVE:  
Patient stated ? Pretty good. ? OBJECTIVE DATA SUMMARY:  
 
Past Medical History:  
Diagnosis Date Alzheimer's disease Atrial fibrillation ([facility] Diabetes ([facility] Hypertension History reviewed. No pertinent surgical history. Barriers to Learning/Limitations: None Compensate with: N/A Home Situation: 
Home Situation Home Environment: Private residence # Steps to Enter: 2 Rails to Enter: Yes Hand Rails : Bilateral 
One/Two Story Residence: One story Living Alone: No 
Support Systems: Spouse/Significant Other/Partner Patient Expects to be Discharged to[de-identified] Private residence Current DME Used/Available at Home: None Critical Behavior: 
Neurologic State: Alert; Appropriate for age Orientation Level: Oriented X4 Cognition: Follows commands Safety/Judgement: Fall prevention Psychosocial 
Patient Behaviors: Calm; Cooperative Family  Behaviors: Calm; Cooperative Needs Expressed: Educational 
Purposeful Interaction: Yes Pt Identified Daily Priority: Clinical issues (comment) Caritas Process: Nurture loving kindness;Establish trust 
Caring Interventions: Reassure Reassure: Therapeutic listening;Caring rounds Therapeutic Modalities: Intentional therapeutic touch;Humor Family  Behaviors: Calm; Cooperative Strength:   
Strength: Generally decreased, functional(R LE) Manual Muscle Testing (LE) 
       R     L Hip Flexion:  2/5 4-/5 Knee EXT:  2+/5 4/5 Knee FLEX:  5-/5 5-/5 Ankle DF:  3/5 5/5 
_____________________________________________ Tone & Sensation:  
Tone: Normal 
 
Sensation: Intact Range Of Motion: 
AROM: Generally decreased, functional(R LE) in R hip and knee flexion PROM: Generally decreased, functional(R LE) in R hip and knee flexion Functional Mobility: 
Bed Mobility: 
Rolling: Moderate assistance Supine to Sit: Maximum assistance Sit to Supine: Moderate assistance Scooting: Minimum assistance(R LE support) Transfers: 
Sit to Stand: Maximum assistance Stand to Sit: Minimum assistance Balance:  
Sitting: Impaired Sitting - Static: Fair (occasional) Sitting - Dynamic: Fair (occasional) Standing: Impaired Standing - Static: Poor Standing - Dynamic : Not tested Ambulation/Gait Training: 
Distance (ft): 2 Feet (ft) Assistive Device: Walker, rolling Ambulation - Level of Assistance: Minimal assistance Gait Description (WDL): Exceptions to [facility] Gait Abnormalities: Antalgic; Step to gait Right Side Weight Bearing: Toe touch Left Side Weight Bearing: Full Base of Support: Narrowed Stance: Left increased;Weight shift Speed/Tamanna: Pace decreased (<100 feet/min) Step Length: Left shortened Swing Pattern: Left asymmetrical 
 
Pain: 
Pain level pre-treatment: 0/10 Pain level post-treatment: 0/10 Pain Intervention(s) : Medication (see MAR); Rest and Repositioning Response to intervention: Nurse notified, See doc flow Heidi Aguilera Activity Tolerance: Fair secondary to R hip pain with functional mobility Please refer to the flowsheet for vital signs taken during this treatment. After treatment:  
?         Patient left in no apparent distress sitting up in chair ? Patient left in no apparent distress in bed 
? Call bell left within reach ? Nursing notified ? Caregiver present ? Bed alarm activated ? SCDs applied COMMUNICATION/EDUCATION:  
?         Role of Physical Therapy in the acute care setting. ?         Fall prevention education was provided and the patient/caregiver indicated understanding. ? Patient/family have participated as able in goal setting and plan of care. ?         Patient/family agree to work toward stated goals and plan of care. ?         Patient understands intent and goals of therapy, but is neutral about his/her participation. ? Patient is unable to participate in goal setting/plan of care: ongoing with therapy staff. ?         Other: Thank you for this referral. 
Sandeep Cordon Time Calculation: 38 mins

## 2019-04-29 ENCOUNTER — HOSPITAL ENCOUNTER (INPATIENT)
Age: 84
LOS: 40 days | Discharge: HOME HEALTH | End: 2019-06-08
Attending: INTERNAL MEDICINE | Admitting: INTERNAL MEDICINE

## 2019-04-29 VITALS
BODY MASS INDEX: 22.5 KG/M2 | WEIGHT: 140 LBS | DIASTOLIC BLOOD PRESSURE: 78 MMHG | HEART RATE: 68 BPM | OXYGEN SATURATION: 92 % | HEIGHT: 66 IN | RESPIRATION RATE: 21 BRPM | SYSTOLIC BLOOD PRESSURE: 140 MMHG | TEMPERATURE: 98 F

## 2019-04-29 DIAGNOSIS — S72.001A CLOSED FRACTURE OF RIGHT HIP, INITIAL ENCOUNTER (HCC): Primary | ICD-10-CM

## 2019-04-29 LAB
ANION GAP SERPL CALC-SCNC: 3 MMOL/L (ref 3–18)
BUN SERPL-MCNC: 18 MG/DL (ref 7–18)
BUN/CREAT SERPL: 18 (ref 12–20)
CALCIUM SERPL-MCNC: 7.8 MG/DL (ref 8.5–10.1)
CHLORIDE SERPL-SCNC: 110 MMOL/L (ref 100–108)
CO2 SERPL-SCNC: 23 MMOL/L (ref 21–32)
CREAT SERPL-MCNC: 0.99 MG/DL (ref 0.6–1.3)
GLUCOSE BLD STRIP.AUTO-MCNC: 160 MG/DL (ref 70–110)
GLUCOSE BLD STRIP.AUTO-MCNC: 176 MG/DL (ref 70–110)
GLUCOSE BLD STRIP.AUTO-MCNC: 181 MG/DL (ref 70–110)
GLUCOSE BLD STRIP.AUTO-MCNC: 239 MG/DL (ref 70–110)
GLUCOSE SERPL-MCNC: 212 MG/DL (ref 74–99)
INR PPP: 2.9 (ref 0.8–1.2)
POTASSIUM SERPL-SCNC: 4.5 MMOL/L (ref 3.5–5.5)
PROTHROMBIN TIME: 30.3 SEC (ref 11.5–15.2)
SODIUM SERPL-SCNC: 136 MMOL/L (ref 136–145)

## 2019-04-29 PROCEDURE — 74011636637 HC RX REV CODE- 636/637: Performed by: FAMILY MEDICINE

## 2019-04-29 PROCEDURE — 97110 THERAPEUTIC EXERCISES: CPT

## 2019-04-29 PROCEDURE — 85610 PROTHROMBIN TIME: CPT

## 2019-04-29 PROCEDURE — 74011250637 HC RX REV CODE- 250/637: Performed by: INTERNAL MEDICINE

## 2019-04-29 PROCEDURE — 74011636637 HC RX REV CODE- 636/637: Performed by: INTERNAL MEDICINE

## 2019-04-29 PROCEDURE — 80048 BASIC METABOLIC PNL TOTAL CA: CPT

## 2019-04-29 PROCEDURE — 82962 GLUCOSE BLOOD TEST: CPT

## 2019-04-29 PROCEDURE — 97530 THERAPEUTIC ACTIVITIES: CPT

## 2019-04-29 PROCEDURE — 74011250636 HC RX REV CODE- 250/636: Performed by: PHYSICIAN ASSISTANT

## 2019-04-29 PROCEDURE — 97116 GAIT TRAINING THERAPY: CPT

## 2019-04-29 PROCEDURE — 36415 COLL VENOUS BLD VENIPUNCTURE: CPT

## 2019-04-29 PROCEDURE — 97166 OT EVAL MOD COMPLEX 45 MIN: CPT

## 2019-04-29 PROCEDURE — 74011250637 HC RX REV CODE- 250/637: Performed by: FAMILY MEDICINE

## 2019-04-29 PROCEDURE — 74011250637 HC RX REV CODE- 250/637: Performed by: ORTHOPAEDIC SURGERY

## 2019-04-29 PROCEDURE — 74011250637 HC RX REV CODE- 250/637: Performed by: HOSPITALIST

## 2019-04-29 RX ORDER — LOVASTATIN 20 MG/1
40 TABLET ORAL
Status: DISCONTINUED | OUTPATIENT
Start: 2019-04-29 | End: 2019-04-29 | Stop reason: CLARIF

## 2019-04-29 RX ORDER — OXYCODONE AND ACETAMINOPHEN 5; 325 MG/1; MG/1
1 TABLET ORAL
Status: DISCONTINUED | OUTPATIENT
Start: 2019-04-29 | End: 2019-05-29

## 2019-04-29 RX ORDER — INSULIN LISPRO 100 [IU]/ML
INJECTION, SOLUTION INTRAVENOUS; SUBCUTANEOUS
Status: DISCONTINUED | OUTPATIENT
Start: 2019-04-29 | End: 2019-06-08 | Stop reason: HOSPADM

## 2019-04-29 RX ORDER — FLAXSEED OIL 1000 MG
1000 CAPSULE ORAL DAILY
Status: DISCONTINUED | OUTPATIENT
Start: 2019-04-30 | End: 2019-06-08 | Stop reason: HOSPADM

## 2019-04-29 RX ORDER — MELATONIN
1000 DAILY
Status: DISCONTINUED | OUTPATIENT
Start: 2019-04-30 | End: 2019-06-08 | Stop reason: HOSPADM

## 2019-04-29 RX ORDER — GLIMEPIRIDE 2 MG/1
4 TABLET ORAL
Status: DISCONTINUED | OUTPATIENT
Start: 2019-04-30 | End: 2019-05-01

## 2019-04-29 RX ORDER — METFORMIN HYDROCHLORIDE 500 MG/1
1000 TABLET ORAL 2 TIMES DAILY WITH MEALS
Status: DISCONTINUED | OUTPATIENT
Start: 2019-04-29 | End: 2019-06-08 | Stop reason: HOSPADM

## 2019-04-29 RX ORDER — OXYCODONE AND ACETAMINOPHEN 10; 325 MG/1; MG/1
2 TABLET ORAL
Status: DISCONTINUED | OUTPATIENT
Start: 2019-04-29 | End: 2019-05-29

## 2019-04-29 RX ORDER — WARFARIN 2 MG/1
2 TABLET ORAL DAILY
Qty: 30 TAB | Refills: 0 | Status: ON HOLD
Start: 2019-04-29 | End: 2019-06-07 | Stop reason: SDUPTHER

## 2019-04-29 RX ORDER — WARFARIN 2 MG/1
2 TABLET ORAL EVERY EVENING
Status: DISCONTINUED | OUTPATIENT
Start: 2019-04-29 | End: 2019-06-05

## 2019-04-29 RX ORDER — PANTOPRAZOLE SODIUM 40 MG/1
40 TABLET, DELAYED RELEASE ORAL
Status: DISCONTINUED | OUTPATIENT
Start: 2019-04-30 | End: 2019-06-08 | Stop reason: HOSPADM

## 2019-04-29 RX ORDER — ADHESIVE BANDAGE
30 BANDAGE TOPICAL DAILY PRN
Status: DISCONTINUED | OUTPATIENT
Start: 2019-04-29 | End: 2019-06-08 | Stop reason: HOSPADM

## 2019-04-29 RX ORDER — OXYCODONE AND ACETAMINOPHEN 5; 325 MG/1; MG/1
1-2 TABLET ORAL
Qty: 12 TAB | Refills: 0 | Status: SHIPPED | OUTPATIENT
Start: 2019-04-29 | End: 2019-06-08

## 2019-04-29 RX ORDER — SIMVASTATIN 20 MG/1
20 TABLET, FILM COATED ORAL
Status: DISCONTINUED | OUTPATIENT
Start: 2019-04-29 | End: 2019-06-08 | Stop reason: HOSPADM

## 2019-04-29 RX ORDER — FACIAL-BODY WIPES
10 EACH TOPICAL DAILY PRN
Status: DISCONTINUED | OUTPATIENT
Start: 2019-04-29 | End: 2019-06-08 | Stop reason: HOSPADM

## 2019-04-29 RX ORDER — LISINOPRIL 10 MG/1
10 TABLET ORAL DAILY
Status: DISCONTINUED | OUTPATIENT
Start: 2019-04-30 | End: 2019-06-08 | Stop reason: HOSPADM

## 2019-04-29 RX ADMIN — VITAMIN D, TAB 1000IU (100/BT) 1000 UNITS: 25 TAB at 08:43

## 2019-04-29 RX ADMIN — OXYCODONE HYDROCHLORIDE AND ACETAMINOPHEN 1 TABLET: 5; 325 TABLET ORAL at 18:21

## 2019-04-29 RX ADMIN — FAMOTIDINE 20 MG: 20 TABLET ORAL at 08:43

## 2019-04-29 RX ADMIN — WARFARIN SODIUM 2 MG: 2 TABLET ORAL at 18:21

## 2019-04-29 RX ADMIN — SODIUM CHLORIDE 100 ML/HR: 900 INJECTION, SOLUTION INTRAVENOUS at 02:49

## 2019-04-29 RX ADMIN — METFORMIN HYDROCHLORIDE 1000 MG: 500 TABLET ORAL at 18:21

## 2019-04-29 RX ADMIN — MAGNESIUM HYDROXIDE 30 ML: 400 SUSPENSION ORAL at 23:00

## 2019-04-29 RX ADMIN — ACETAMINOPHEN 650 MG: 325 TABLET, FILM COATED ORAL at 12:08

## 2019-04-29 RX ADMIN — INSULIN LISPRO 4 UNITS: 100 INJECTION, SOLUTION INTRAVENOUS; SUBCUTANEOUS at 12:11

## 2019-04-29 RX ADMIN — INSULIN LISPRO 2 UNITS: 100 INJECTION, SOLUTION INTRAVENOUS; SUBCUTANEOUS at 22:26

## 2019-04-29 RX ADMIN — SIMVASTATIN 20 MG: 20 TABLET, FILM COATED ORAL at 21:51

## 2019-04-29 RX ADMIN — ACETAMINOPHEN 650 MG: 325 TABLET, FILM COATED ORAL at 05:47

## 2019-04-29 RX ADMIN — LISINOPRIL 10 MG: 10 TABLET ORAL at 12:09

## 2019-04-29 RX ADMIN — LOVASTATIN 40 MG: 20 TABLET ORAL at 14:18

## 2019-04-29 NOTE — PROGRESS NOTES
I have reviewed this patient's current medication list and recent laboratory results. At this time, I do not suggest any drug therapy adjustments or additional laboratory monitoring. Thank you,  Rao GUADARRAMA  Ph. M. S.  4/29/2019

## 2019-04-29 NOTE — DISCHARGE INSTRUCTIONS
DISCHARGE SUMMARY from Nurse    PATIENT INSTRUCTIONS:    After general anesthesia or intravenous sedation, for 24 hours or while taking prescription Narcotics:  · Limit your activities  · Do not drive and operate hazardous machinery  · Do not make important personal or business decisions  · Do  not drink alcoholic beverages  · If you have not urinated within 8 hours after discharge, please contact your surgeon on call. Report the following to your surgeon:  · Excessive pain, swelling, redness or odor of or around the surgical area  · Temperature over 100.5  · Nausea and vomiting lasting longer than 4 hours or if unable to take medications  · Any signs of decreased circulation or nerve impairment to extremity: change in color, persistent  numbness, tingling, coldness or increase pain  · Any questions    What to do at Home:  Recommended activity: Activity as tolerated and no driving for today and Ambulate in house,     If you experience any of the following symptoms like increasing a[pin  , please follow up with primary MD  .    *  Please give a list of your current medications to your Primary Care Provider. *  Please update this list whenever your medications are discontinued, doses are      changed, or new medications (including over-the-counter products) are added. *  Please carry medication information at all times in case of emergency situations. These are general instructions for a healthy lifestyle:    No smoking/ No tobacco products/ Avoid exposure to second hand smoke  Surgeon General's Warning:  Quitting smoking now greatly reduces serious risk to your health.     Obesity, smoking, and sedentary lifestyle greatly increases your risk for illness    A healthy diet, regular physical exercise & weight monitoring are important for maintaining a healthy lifestyle    You may be retaining fluid if you have a history of heart failure or if you experience any of the following symptoms:  Weight gain of 3 pounds or more overnight or 5 pounds in a week, increased swelling in our hands or feet or shortness of breath while lying flat in bed. Please call your doctor as soon as you notice any of these symptoms; do not wait until your next office visit. Recognize signs and symptoms of STROKE:    F-face looks uneven    A-arms unable to move or move unevenly    S-speech slurred or non-existent    T-time-call 911 as soon as signs and symptoms begin-DO NOT go       Back to bed or wait to see if you get better-TIME IS BRAIN. Warning Signs of HEART ATTACK     Call 911 if you have these symptoms:   Chest discomfort. Most heart attacks involve discomfort in the center of the chest that lasts more than a few minutes, or that goes away and comes back. It can feel like uncomfortable pressure, squeezing, fullness, or pain.  Discomfort in other areas of the upper body. Symptoms can include pain or discomfort in one or both arms, the back, neck, jaw, or stomach.  Shortness of breath with or without chest discomfort.  Other signs may include breaking out in a cold sweat, nausea, or lightheadedness. Don't wait more than five minutes to call 911 - MINUTES MATTER! Fast action can save your life. Calling 911 is almost always the fastest way to get lifesaving treatment. Emergency Medical Services staff can begin treatment when they arrive -- up to an hour sooner than if someone gets to the hospital by car. Patient armband removed and shredded  MyChart Activation    Thank you for requesting access to [employer]. Please follow the instructions below to securely access and download your online medical record. [employer] allows you to send messages to your doctor, view your test results, renew your prescriptions, schedule appointments, and more. How Do I Sign Up? 1. In your internet browser, go to [email or web address]  2. Click on the First Time User? Click Here link in the Sign In box.  You will be redirect to the New Member Sign Up page. 3. Enter your [employer] Access Code exactly as it appears below. You will not need to use this code after youve completed the sign-up process. If you do not sign up before the expiration date, you must request a new code. [employer] Access Code: [email or web address]  Expires: 6/10/2019  3:39 AM (This is the date your [employer] access code will )    4. Enter the last four digits of your Social Security Number (xxxx) and Date of Birth (mm/dd/yyyy) as indicated and click Submit. You will be taken to the next sign-up page. 5. Create a [employer] ID. This will be your [employer] login ID and cannot be changed, so think of one that is secure and easy to remember. 6. Create a [employer] password. You can change your password at any time. 7. Enter your Password Reset Question and Answer. This can be used at a later time if you forget your password. 8. Enter your e-mail address. You will receive e-mail notification when new information is available in [address] 9. Click Sign Up. You can now view and download portions of your medical record. 10. Click the Download Summary menu link to download a portable copy of your medical information. Additional Information    If you have questions, please visit the Frequently Asked Questions section of the [employer] website at [email or web address] Remember, [employer] is NOT to be used for urgent needs. For medical emergencies, dial 911. The discharge information has been reviewed with the patient and spouse. The patient and spouse verbalized understanding. Discharge medications reviewed with the patient and spouse and appropriate educational materials and side effects teaching were provided.   ___________________________________________________________________________________________________________________________________

## 2019-04-29 NOTE — ROUTINE PROCESS
Bedside and Verbal shift change report given to Antonina Cordero RN (oncoming nurse) by Lia Sherman RN 
 (offgoing nurse). Report included the following information SBAR, OR Summary, Procedure Summary, Intake/Output, MAR and Recent Results.

## 2019-04-29 NOTE — PROGRESS NOTES
0925  BP and pulse wnl, no respiratory distress but he was been wearing 02 last night, pulse ox wnl, no dizziness, PT get him up to the chair, bell with in reach. 1130  No pain at this time. Assisted by PDEE 
 
1400  Report to rehab givenTylenol , dressing to be change before transfer. Duscharge instruction given, verbalized understanding. To rehab. 
 
1520  transfer to [facility].

## 2019-04-29 NOTE — PROGRESS NOTES
OCCUPATIONAL THERAPY EVALUATION Patient: Edra Aase (89 y.o. male) Date: 4/29/2019 Primary Diagnosis: Hip fracture ([facility] [address] Allie Gonzalez Closed right hip fracture, initial encounter ([facility] [address] Tristian Angeles Procedure(s) (LRB): 
right hip percutaneous pinning (Left) 3 Days Post-Op Precautions: TTWB((R)LE) PLOF: Pt reports being independent with all ADLs & IADLs ASSESSMENT : 
Based on the objective data described below, the patient presents with (R) hip fx. Upon entering room RN present, pt supine in bed. Bed mobility Mod-Max(A)x2, however pt participated in movement of (R)LE. Sit to stand x2 from EOB w/ Min(A)x2 w/ use of Rw. Pt required max cues safe hand placement during transitions & for TTWB on (R)LE as pt was non-compliant during functional transfer from bed to chair; needs reinforcement. Pt reports 0/10 pain pre & post tx however 10/10 pain during transitions at EOB. Pt's spouse present during tx. Pt left in chair, needs within reach, & RN notified. Patient will benefit from skilled intervention to address the above impairments. Patient's rehabilitation potential is considered to be Fair Factors which may influence rehabilitation potential include: ? None noted ? Mental ability/status ? Medical condition ? Home/family situation and support systems ? Safety awareness ? Pain tolerance/management ? Other: PLAN : 
Recommendations and Planned Interventions:  
?               Self Care Training                  ? Therapeutic Activities ? Functional Mobility Training   ? Cognitive Retraining 
? Therapeutic Exercises           ? Endurance Activities ? Balance Training                    ? Neuromuscular Re-Education ? Visual/Perceptual Training     ? Home Safety Training ?               Patient Education                   ? Family Training/Education ? Other (comment): Frequency/Duration: Patient will be followed by occupational therapy 4-7 times a week to address goals. Discharge Recommendations: Vik Banks Further Equipment Recommendations for Discharge: bedside commode, shower chair SUBJECTIVE:  
Patient stated let's do it anyway.  (regarding standing) OBJECTIVE DATA SUMMARY:  
 
Past Medical History:  
Diagnosis Date  Alzheimer's disease  Atrial fibrillation ([facility] [address]  Diabetes ([facility] [address]  Hypertension History reviewed. No pertinent surgical history. Barriers to Learning/Limitations: None Compensate with: visual, verbal, tactile, kinesthetic cues/model Home Situation:  
Home Situation Home Environment: Private residence # Steps to Enter: 2 Rails to Enter: Yes Hand Rails : Bilateral 
One/Two Story Residence: One story Living Alone: No 
Support Systems: Spouse/Significant Other/Partner, Family member(s) Patient Expects to be Discharged to[de-identified] Private residence Current DME Used/Available at Home: Grab bars Tub or Shower Type: Shower ? Right hand dominant   ? Left hand dominant Cognitive/Behavioral Status: 
Neurologic State: Alert Orientation Level: Oriented X4 Cognition: Follows commands Safety/Judgement: Decreased awareness of environment Skin: Intact Edema: None noted. Vision/Perceptual:   
Acuity: Within Defined Limits Balance: 
Sitting: Intact Sitting - Static: Fair (occasional) Sitting - Dynamic: Fair (occasional) Standing: Impaired Standing - Static: Fair Standing - Dynamic : Fair(fair-.) Strength: BUE Strength: Generally decreased, functional(BUEs) Range of Motion: BUE 
AROM: Within functional limits Functional Mobility and Transfers for ADLs: 
Bed Mobility: 
Rolling: (n/t ) Supine to Sit: Moderate assistance;Maximum assistance Sit to Supine: (n/t pt left in chair) Scooting: Moderate assistance Transfers: 
Sit to Stand: Minimum assistance;Assist x2 Bed to Chair: Moderate assistance;Assist x2(Max VCs for safe hand placement & TTWB) ADL Assessment:  
Feeding: Modified independent Oral Facial Hygiene/Grooming: Setup Bathing: Moderate assistance Upper Body Dressing: Setup Lower Body Dressing: Maximum assistance Toileting: Maximum assistance Cognitive Retraining Safety/Judgement: Decreased awareness of environment Therapeutic Activity: 
Sit to stand from EOB x2 w/ Min(A)x2 w/ use of Rw. Transfer from EOB to bedside chair w/ Min(A)x2 & use of RW in prep for [facility] transfer. Pain: 
Pain level pre-treatment: 0/10 Pain level post-treatment: 0/10 Activity Tolerance:  
Fair Please refer to the flowsheet for vital signs taken during this treatment. After treatment:  
? Patient left in no apparent distress sitting up in chair ? Patient left in no apparent distress in bed 
? Call bell left within reach ? Nursing notified ? Caregiver present ? Bed alarm activated COMMUNICATION/EDUCATION: Educated pt on safe hand placement during transitions & TTWB - needs reinforcement. ? Role of Occupational Therapy in the acute care setting 
? Home safety education was provided and the patient/caregiver indicated understanding. ? Patient/family have participated as able in goal setting and plan of care. ? Patient/family agree to work toward stated goals and plan of care. ? Patient understands intent and goals of therapy, but is neutral about his/her participation. ? Patient is unable to participate in goal setting and plan of care. Thank you for this referral. 
Yaw Talavera Time Calculation: 28 mins Eval Complexity: History: MEDIUM Complexity : Expanded review of history including physical, cognitive and psychosocial  history ;   
Examination: MEDIUM Complexity : 3-5 performance deficits relating to physical, cognitive , or psychosocial skils that result in activity limitations and / or participation restrictions; Decision Making:MEDIUM Complexity : Patient may present with comorbidities that affect occupational performnce. Miniml to moderate modification of tasks or assistance (eg, physical or verbal ) with assesment(s) is necessary to enable patient to complete evaluation

## 2019-04-29 NOTE — PROGRESS NOTES
@ 0830  Ortho rounds complete with coordinator at bedside. Awake in bed enjoying breakfast.  No acute distress/no c/o of pain at this time. Continue PT/OT, OOB with assist, ICS as directed. Safety measures remain in place, call bell in reach.

## 2019-04-29 NOTE — PROGRESS NOTES
Progress Note Post Operative Day:3 Assessment: 1. Status post right hip percutaneous pinning, Progressing. 
  
PLAN:   
1. Mobilize. Continue P.T. 
2. TTWB 3. DVT prophylaxis per IM, coumadin 4. Discharge Planning. F/u in the office in 1 month. Staples out in 14 days. HPI: Wil Rudolph is a 80 y.o. male patient without new complaints. No new orthopaedic changes. Blood pressure 119/56, pulse 65, temperature 98 °F (36.7 °C), resp. rate 21, height 5' 6\" (1.676 m), weight 63.5 kg (140 lb), SpO2 95 %. CBC w/Diff Lab Results Component Value Date/Time WBC 6.6 04/27/2019 04:10 AM  
 RBC 3.74 (L) 04/27/2019 04:10 AM  
 HGB 10.0 (L) 04/28/2019 04:20 AM  
 HCT 30.7 (L) 04/28/2019 04:20 AM  
 MCV 85.8 04/27/2019 04:10 AM  
 MCH 27.8 04/27/2019 04:10 AM  
 MCHC 32.4 04/27/2019 04:10 AM  
 RDW 14.8 (H) 04/27/2019 04:10 AM  
 Lab Results Component Value Date/Time MONOS 8 04/26/2019 05:44 AM  
 EOS 2 04/26/2019 05:44 AM  
 BASOS 0 04/26/2019 05:44 AM  
 RDW 14.8 (H) 04/27/2019 04:10 AM  
  
  
 
 
Physical Assessment: 
General: in no apparent distress, well developed and well nourished, non-toxic, in no respiratory distress and acyanotic, alert and oriented times 3 Wound: no drainage Extremities:  Neurovascular intact RLE. Compartments soft and NT distal to surgical  site. Able to contract quadriceps. Plantar and dorsiflexion intact. Palpable DP/PT pulses noted. Denies paresthesias Dressing:  CDI  
DVT Exam:   No exam evidence to suggest DVT. Compartments soft and NT. Radiology: no ortho studies Office [phone]

## 2019-04-29 NOTE — PROGRESS NOTES
Problem: Mobility Impaired (Adult and Pediatric) Goal: *Acute Goals and Plan of Care (Insert Text) Description Physical Therapy Goals Initiated 4/28/2019 and to be accomplished within 7 day(s) 1. Patient will move from supine to sit and sit to supine in bed with moderate assistance  in order to facilitate eating meals in sitting. 2.  Patient will perform sit to stand with moderate assistance  in order to prepare for standing ADLs. 3.  Patient will ambulate with minimal assistance/contact guard assist for >/25 feet with the least restrictive device in order to facilitate improved ease with ambulation to the restroom. 4.  Patient will ascend/descend 2 stairs with handrail(s) with moderate assistance  in order to prepare patient to safely enter residence. 5.  Patient will roll side to side in bed with supervision/set-up in order to prepare for OOB activity. Outcome: Progressing Towards Goal 
 PHYSICAL THERAPY TREATMENT Patient: Colin Cooper (83 y.o. male) Date: 4/29/2019 Diagnosis: Hip fracture ([facility] [address] Marciano Rise Closed right hip fracture, initial encounter ([facility] [address] [S72.001A] Closed right hip fracture, initial encounter ([facility] [address] Procedure(s) (LRB): 
right hip percutaneous pinning (Left) 3 Days Post-Op Precautions: TTWB((R)LE) PLOF: Independent ASSESSMENT: 
Pt requesting return to bed, educated pt on TTWB and instructed with demonstrations strategies to maintain compliance during transfers and gait. Pt required min a for sit<>stand transfers, frequent cues for compliance with TTWB, verbal cues for safety during transfers. Pt instructed in strategies for bed mobility, LE exercises for strength and ROM to improve functional mobility. Progression toward goals:  
?      Improving appropriately and progressing toward goals ? Improving slowly and progressing toward goals ? Not making progress toward goals and plan of care will be adjusted PLAN: 
 Patient continues to benefit from skilled intervention to address the above impairments. Continue treatment per established plan of care. Discharge Recommendations:  Vik Banks Further Equipment Recommendations for Discharge:  TBD at next level of care SUBJECTIVE:  
Patient stated ? I dont have pain except with I move. ? OBJECTIVE DATA SUMMARY:  
Critical Behavior: 
Neurologic State: Alert Orientation Level: Oriented X4 Cognition: Follows commands Safety/Judgement: Decreased awareness of environment Functional Mobility Training: 
Bed Mobility: 
Rolling: (n/t ) Supine to Sit: Other (comment) Sit to Supine: Minimum assistance Scooting: Contact guard assistance Transfers: 
Sit to Stand: Minimum assistance Stand to Sit: Minimum assistance Bed to Chair: Minimum assistance Balance: 
Sitting: Intact Sitting - Static: Fair (occasional) Sitting - Dynamic: Fair (occasional) Standing: Impaired Standing - Static: Fair Standing - Dynamic : Fair(fair-. ) Ambulation/Gait Training: 
Distance (ft): 3 Feet (ft) Assistive Device: Walker, rolling Ambulation - Level of Assistance: Minimal assistance Right Side Weight Bearing: Toe touch Base of Support: Center of gravity altered Speed/Tamanna: Pace decreased (<100 feet/min); Shuffled Therapeutic Exercises:  
 
 
 
EXERCISE Sets Reps Active Active Assist  
Passive Self ROM Comments Ankle Pumps 1 20  ? ? ? ?   
Quad Sets/Glut Sets 1 10  ? ? ? ? Hamstring Sets   ? ? ? ? Short Arc Quads   ? ? ? ? Heel Slides 1 10 ? ? ? ? Straight Leg Raises   ? ? ? ? Hip Add   ? ? ? ? Long Arc Quads   ? ? ? ? Seated Marching   ? ? ? ? Standing Marching   ? ? ? ?   
   ? ? ? ? Pain: 
Pain level pre-treatment: 0/10- when not moving Pain level post-treatment: 0/10 - when not moving Pain Intervention(s): provided ice pack Activity Tolerance:  
Fair Please refer to the flowsheet for vital signs taken during this treatment. After treatment:  
? Patient left in no apparent distress sitting up in chair ? Patient left in no apparent distress in bed 
? Call bell left within reach ? Nursing notified ? Caregiver present ? Bed alarm activated ? SCDs applied COMMUNICATION/EDUCATION:  
?          
? Fall prevention education was provided and the patient/caregiver indicated understanding. ? Patient/family have participated as able in working toward goals and plan of care. ?         Patient/family agree to work toward stated goals and plan of care. ?         Patient understands intent and goals of therapy, but is neutral about his/her participation. ? Patient is unable to participate in stated goals/plan of care: ongoing with therapy staff. ?         Role of Physical Therapy in the acute care setting. Osker Mail, PTA Time Calculation: 27 mins

## 2019-04-29 NOTE — DISCHARGE SUMMARY
Internal Medicine Discharge Summary        Patient: Elisabeth Marie    YOB: 1931    Age:  80 y.o. Admit Date: 4/26/2019    Discharge Date: 4/29/2019    LOS:  LOS: 3 days     Discharge To: SNF    Consults: Orthopedics    Admission Diagnoses: Hip fracture ([place] [address] [S72.009A]  Closed right hip fracture, initial encounter ([place] [address] [S72.001A]    Discharge Diagnoses:    Problem List as of 4/29/2019 Never Reviewed          Codes Class Noted - Resolved    Hip fracture ([place] [address] ICD-10-CM: S72.009A  ICD-9-CM: 820.8  4/26/2019 - Present        * (Principal) Closed right hip fracture, initial encounter ([place] [address] ICD-10-CM: S72.001A  ICD-9-CM: 820.8  4/26/2019 - Present        Atrial fibrillation ([place] [address] ICD-10-CM: I48.91  ICD-9-CM: 427.31  4/26/2019 - Present        HTN (hypertension) ICD-10-CM: I10  ICD-9-CM: 401.9  4/26/2019 - Present              Discharge Condition:  Improved    Procedures: right hip percutaneous pinning         HPI: Elisabeth Marie is a 80 y.o. male who presented to the ED with right hip pain and inability to ambulate after an accidental fall at home. He slipped and fell. No associated sx prior to fall. Could not bear weight on the RLE after the fall. On chronic anticoagulation. Hospital Course: The patient was taken to the OR for right hip percutaneous pinning. He tolerated the procedure well. He was restarted on his coumadin but at lower level given INR. PT evaluated and recommended rehab. Orthopedic surgery recommended TTWB to RLE. Staples out in 14 days and follow up in one month. He will need to have PT/INR checks while at rehab for coumadin dosing. The rest of the patient's chronic conditions were managed appropriately during their admission. They were medically stable at the time of discharge.     Visit Vitals  /56 (BP 1 Location: Right arm, BP Patient Position: At rest)   Pulse 65   Temp 98 °F (36.7 °C)   Resp 21   Ht 5' 6\" (1.676 m)   Wt 63.5 kg (140 lb) SpO2 95%   BMI 22.60 kg/m²       Physical Exam at Discharge:  General Appearance: NAD, conversant  HENT: normocephalic/atraumatic, moist mucus membranes  Lungs: CTA with normal respiratory effort  CV: RRR, no m/r/g  Abdomen: soft, non-tender, normal bowel sounds  Extremities: no cyanosis, no peripheral edema  Neuro: moves all extremities, no focal deficits  Psych: appropriate affect, alert and oriented to person, place and time    Labs Prior to Discharge:  Labs: Results:       Chemistry Recent Labs     04/27/19 0410   *      K 4.6      CO2 27   BUN 34*   CREA 1.43*   CA 7.5*   AGAP 7   BUCR 24*      CBC w/Diff Recent Labs     04/28/19  0420 04/27/19 0410   WBC  --  6.6   RBC  --  3.74*   HGB 10.0* 10.4*   HCT 30.7* 32.1*   PLT  --  190      Cardiac Enzymes No results for input(s): CPK, CKND1, KANDACE in the last 72 hours. No lab exists for component: CKRMB, TROIP   Coagulation Recent Labs     04/29/19  0350 04/28/19 0420   PTP 30.3* 31.3*   INR 2.9* 3.0*       Lipid Panel No results found for: CHOL, CHOLPOCT, CHOLX, CHLST, CHOLV, 008619, HDL, LDL, LDLC, DLDLP, [id number], VLDLC, VLDL, TGLX, TRIGL, TRIGP, TGLPOCT, CHHD, CHHDX   BNP No results for input(s): BNPP in the last 72 hours. Liver Enzymes No results for input(s): TP, ALB, TBIL, AP, SGOT, GPT in the last 72 hours. No lab exists for component: DBIL   Thyroid Studies No results found for: T4, T3U, TSH, TSHEXT         Significant Imaging:  Xr Elbow Rt Min 3 V    Result Date: 4/26/2019  EXAM: RIGHT ELBOW RADIOGRAPHS CLINICAL INDICATION/HISTORY: Right elbow pain following fall -Additional: None COMPARISON: None TECHNIQUE: 4 views of the right elbow _______________ FINDINGS: BONES: Osseous alignment is as expected on the provided projections. Radial head and neck appear intact. Foci of opacification are noted adjacent to both the common flexor and common extensor tendon origins. Tricompartmental right elbow joint osteoarthritis is noted. Potential avulsion fracture adjacent to the bicipital tuberosity of the radius noted with ossific fragment demonstrated on the lateral and oblique projections. Prominent triceps insertional enthesophyte. SOFT TISSUES: No definite elbow joint effusion on the images provided. No retained radiopaque foreign object. _______________     IMPRESSION: 1. Potential avulsion fracture of the bicipital tuberosity of the radius 2. Normal alignment at the right elbow joint without additional evidence of fracture 3. Tricompartmental right elbow joint osteoarthritis and heterotopic ossification adjacent to the humeral epicondyles suggesting sequela of medial and lateral epicondylitis. Xr Hip Rt W Or Wo Pelv 2-3 Vws    Result Date: 4/27/2019  EXAM: RIGHT HIP RADIOGRAPHS CLINICAL INDICATION/HISTORY: right hip pinning vs joselyn arthroplasty (N/A Hip) -Additional: None COMPARISON: 4/26/2019 TECHNIQUE: Fluoroscopically obtained AP and frog-leg lateral views of the right hip Fluoroscopic time: 83 seconds Fluoroscopic dose (reference air kerma): 6.85 mGy _______________ FINDINGS: BONES: Percutaneous pin fixation and reduction of a transcervical right femoral neck fracture. No additional fractures noted. SOFT TISSUES: Small amount of subcutaneous emphysema as expected. _______________     IMPRESSION: Interval operative reduction and percutaneous pin fixation of a subcapital right femoral neck fracture. Xr Hip Rt W Or Wo Pelv 2-3 Vws    Result Date: 4/26/2019  EXAM: RIGHT HIP RADIOGRAPHS CLINICAL INDICATION/HISTORY: Hip pain -Additional: Fall, unable to bear weight COMPARISON: None TECHNIQUE: AP pelvis and frog-leg lateral view of right hip. _______________ FINDINGS: BONES: Intact ilioischial and iliopectineal lines. Mildly impacted subcapital femoral neck fracture. Moderately severe right hip joint osteoarthritis with prominent osteophyte formation and foci of heterotopic ossification throughout the acetabular labrum.  SOFT TISSUES: Unremarkable. _______________     IMPRESSION: 1. Impacted subcapital right femoral neck fracture with moderately severe right hip joint osteoarthritis. Note: Preliminary report sent to the Emergency Department by the radiology resident at the time of the study. Xr Chest Port    Result Date: 4/26/2019  CHEST AP PORTABLE Indication: Preop exam for right hip fixation. Comparison: 01/20/2006. Findings: The lungs appear clear. The cardiac silhouette and pulmonary vascularity appear within normal limits. Aortic atherosclerosis noted. No evidence for pneumothorax or pleural effusion. Impression: No acute cardiopulmonary disease noted. Discharge Medications:     Current Discharge Medication List      START taking these medications    Details   oxyCODONE-acetaminophen (PERCOCET) 5-325 mg per tablet Take 1-2 Tabs by mouth every four (4) hours as needed for Pain for up to 3 days. Max Daily Amount: 12 Tabs. Qty: 12 Tab, Refills: 0    Associated Diagnoses: Closed fracture of right hip, initial encounter ([facility] [address]         CONTINUE these medications which have CHANGED    Details   warfarin (COUMADIN) 2 mg tablet Take 1 Tab by mouth daily. Qty: 30 Tab, Refills: 0         CONTINUE these medications which have NOT CHANGED    Details   cholecalciferol (VITAMIN D3) 1,000 unit cap Take 1,000 Units by mouth daily. flaxseed oil 1,000 mg cap Take  by mouth.      glimepiride (AMARYL) 4 mg tablet Take 4 mg by mouth every morning. lisinopril (PRINIVIL, ZESTRIL) 10 mg tablet Take 10 mg by mouth daily. lovastatin (MEVACOR) 40 mg tablet Take 40 mg by mouth daily. metFORMIN (GLUCOPHAGE) 1,000 mg tablet Take 1,000 mg by mouth two (2) times daily (with meals). raNITIdine (ZANTAC) 150 mg tablet Take 150 mg by mouth two (2) times a day.              Activity: PT/OT Eval and Treat, TTWB    Diet: Resume previous diet    Wound Care: Staples out in 14 days    Follow-up:   Please follow up with your PCP within 7 days to discuss your recent hospitalization. Patient to arrange.   Orthopedic surgery in 1 month  PT/INR checks         Total time spent including time spent on final examination and discharge discussion, discharge documentation and records reviewed and medication reconciliation: > 30 minutes    Ilya Calles DO  Internal Medicine, Hospitalist  Pager: [phone]

## 2019-04-30 LAB
GLUCOSE BLD STRIP.AUTO-MCNC: 100 MG/DL (ref 70–110)
GLUCOSE BLD STRIP.AUTO-MCNC: 149 MG/DL (ref 70–110)
GLUCOSE BLD STRIP.AUTO-MCNC: 185 MG/DL (ref 70–110)
GLUCOSE BLD STRIP.AUTO-MCNC: 96 MG/DL (ref 70–110)

## 2019-04-30 PROCEDURE — 82962 GLUCOSE BLOOD TEST: CPT

## 2019-04-30 PROCEDURE — 74011250637 HC RX REV CODE- 250/637: Performed by: INTERNAL MEDICINE

## 2019-04-30 PROCEDURE — 74011636637 HC RX REV CODE- 636/637: Performed by: INTERNAL MEDICINE

## 2019-04-30 RX ORDER — DOCUSATE SODIUM 100 MG/1
100 CAPSULE, LIQUID FILLED ORAL DAILY
Status: DISCONTINUED | OUTPATIENT
Start: 2019-05-01 | End: 2019-06-08 | Stop reason: HOSPADM

## 2019-04-30 RX ORDER — POLYETHYLENE GLYCOL 3350 17 G/17G
17 POWDER, FOR SOLUTION ORAL DAILY
Status: DISCONTINUED | OUTPATIENT
Start: 2019-05-01 | End: 2019-06-08 | Stop reason: HOSPADM

## 2019-04-30 RX ADMIN — GLIMEPIRIDE 4 MG: 2 TABLET ORAL at 08:58

## 2019-04-30 RX ADMIN — PANTOPRAZOLE SODIUM 40 MG: 40 TABLET, DELAYED RELEASE ORAL at 08:58

## 2019-04-30 RX ADMIN — OXYCODONE HYDROCHLORIDE AND ACETAMINOPHEN 1 TABLET: 5; 325 TABLET ORAL at 08:59

## 2019-04-30 RX ADMIN — INSULIN LISPRO 2 UNITS: 100 INJECTION, SOLUTION INTRAVENOUS; SUBCUTANEOUS at 12:32

## 2019-04-30 RX ADMIN — METFORMIN HYDROCHLORIDE 1000 MG: 500 TABLET ORAL at 17:02

## 2019-04-30 RX ADMIN — METFORMIN HYDROCHLORIDE 1000 MG: 500 TABLET ORAL at 08:58

## 2019-04-30 RX ADMIN — CHOLECALCIFEROL (VITAMIN D3) 25 MCG (1,000 UNIT) TABLET 1000 UNITS: at 08:58

## 2019-04-30 RX ADMIN — OXYCODONE HYDROCHLORIDE AND ACETAMINOPHEN 1 TABLET: 5; 325 TABLET ORAL at 05:01

## 2019-04-30 RX ADMIN — WARFARIN SODIUM 2 MG: 2 TABLET ORAL at 17:02

## 2019-04-30 RX ADMIN — SIMVASTATIN 20 MG: 20 TABLET, FILM COATED ORAL at 21:25

## 2019-04-30 RX ADMIN — LISINOPRIL 10 MG: 10 TABLET ORAL at 08:58

## 2019-04-30 RX ADMIN — Medication 1000 MG: at 09:00

## 2019-04-30 NOTE — PROGRESS NOTES
x?   Occupational Therapy          Functional goals:             ?  Maintain current functional status             ?x  ]   Improvement            Functional interventions:        Functional interventions: Improve bathing, dressing, self feeding, toileting and functional transfers during self care tasks. Frequency: 1-2x/ day for  3 to 6 times per week       ? Speech Therapy          Functional goals:             ?  ] Maintain current functional status             ?      Improvement            Functional interventions:             Frequency:

## 2019-04-30 NOTE — ROUTINE PROCESS
Patient has not had a bowel movement in three days. Was given a stool softener before transfering to this unit. Abdomen distended, tight. MOM given. Reporting off to night shift.

## 2019-04-30 NOTE — ROUTINE PROCESS
X  Physical Therapy          Functional goals:            ?   Maintain current functional status            X    Improvement            Functional interventions:                - to improve walking                - to improve strength                - to improve balance                - to improve endurance                - to improve ability to use stairs                 - to improve general movement             Frequency:  1-2X/ day for 3-6 visits/ week     ? Occupational Therapy          Functional goals:             ?  Maintain current functional status             ?  ]   Improvement            Functional interventions:             Frequency:         ?    Speech Therapy          Functional goals:             ?  ] Maintain current functional status             ?      Improvement            Functional interventions:             Frequency:

## 2019-04-30 NOTE — PROGRESS NOTES
Dietary Orders:     ?    Regular     X    Diabetic:      ?    Cardiac:     ?    Other:       ?    Tube feeding     ? IV fluids for hydration     ? Fluid restrictions:     Residents dietary preferences:  Likes chocolate milk    Reason for modified diet:     Dietary Risks      ? Weight loss      ? Swallowing problems      ? Chewing problems      ? Missing teeth/poor dentition      ? Edentulous      ? Mouth pain/discomfort         ? Other    Residents dietary goal:     X      Maintain current weight     ? Prevent weight loss     ? Other   Dietary interventions     ? Eats in dining room     ? Eats in room     ? Dentures/partials     ?      Specialty utensils or devices:     ?     Other

## 2019-04-30 NOTE — PROGRESS NOTES
Problem: Mobility Impaired (Adult and Pediatric) Goal: *Acute Goals and Plan of Care (Insert Text) Description PHYSICAL THERAPY STG GOALS : 
Initiated 4/30/2019 and to be accomplished within 2 Weeks 1. Patient will move from supine to sit and sit to supine  and roll side to side in bed with stand by assistance. 2.  Patient will transfer from bed to chair and chair to bed with contact guard assist using RW with WB compliance. 3.  Patient will perform sit to stand with contact guard assist with Fair+ balance and safety awareness with WB compliance. 4.  Patient will ambulate with contact guard assist for 75 feet with RW on level surfaces with 2 turns with WB compliance. 5.  Patient will ascend/descend 1 step with bilateral handrail(s) with minimal assistance to allow for safe home access/exit with WB compliance. 6.  Patient will improve standardized test score for [place] Standing Balance Scale 3/5 to reduce fall risk. PHYSICAL THERAPY LTG GOALS : 
Initiated 4/30/2019 and to be accomplished within 4 Weeks 1. Patient will move from supine to sit and sit to supine  and roll side to side in bed with modified independence. 2.  Patient will transfer from bed to chair and chair to bed with supervision/set-up using RW with WB compliance. 3.  Patient will perform sit to stand with supervision/set-up with Good balance and safety awareness. 4.  Patient will ambulate with supervision/set-up for 200 feet with RW on level surfaces and be able to maneuver through narrow spaces and obstacles without loss of balance with WB compliance. 5.  Patient will ascend/descend 3 stairs with NO handrail(s) with stand by assistance to allow for safe home access/exit. 6.  Patient will improve standardized test score for [place] Standing Balance Scale 4/5 to reduce fall risk. Physical Therapist:   Juno Fonseca, PT  on 4/30/2019 Outcome: [address]  
 PHYSICAL THERAPY EVALUATION Patient: Kate Alexis (41 y.o. male)                Start of Care Date: 4/30/2019 Referred by : Meghann Albright MD                
Precautions: TTWB, Fall Treatment Diagnosis: Muscle Weakness, Difficulty in Walking History:  Patient [facility] after sustaining a fall at home. Diagnostics revealed R impacted subcapital hip fracture, surgically addressed with R hip percutaneous pinning. Upon acute d/c, he was unsafe to return home and was transferred to [facility] for skilled physical therapy services. History of present problem reveals HIGH Complexity :3+ comorbidities / personal factors will impact the outcome/ POC . Past Medical history includes:  
Past Medical History:  
Diagnosis Date  Alzheimer's disease  Atrial fibrillation ([facility] [place] [address]  Diabetes ([facility] [place] [address]  Hypertension No past surgical history on file. Cognitive Status: 
Mental Status Neurologic State: Alert Orientation Level: Oriented to person;Oriented to place;Oriented to situation(required cueing for date \"March 19, 2019\") Cognition: Follows commands Perception: Appears intact Perseveration: No perseveration noted Safety/Judgement: Fall prevention Barriers to Learning/Limitations: yes;  sensory deficits-vision/hearing/speech and altered mental status (i.e.Sedation, Confusion) Compensate with: visual, verbal, tactile, kinesthetic cues/model Prior Level of Function/Home Situation and Assitance recieved: 
Home Situation Home Environment: Private residence # Steps to Enter: 3 Rails to Enter: No 
Wheelchair Ramp: No 
One/Two Story Residence: One story Living Alone: No 
Support Systems: Spouse/Significant Other/Partner, Child(bethany) Patient Expects to be Discharged to[de-identified] Private residence Current DME Used/Available at Home: None Tub or Shower Type: Shower Level of Assistance received at Home:   Per patient, prior to this current hospitalization, the patient ambulated without use of AD and did not required assistance for ADLs. Justification for Evaluation complexity:  Patient is referred to Skilled Physical Therapy services due a functional decline in strength, endurance, mobility, gait, balance, stair negotiation and required an overall HIGH  complexity evaluation examination. Eval Complexity: History: HIGH Complexity :3+ comorbidities / personal factors will impact the outcome/ POC Exam:HIGH Complexity : 4+ Standardized tests and measures addressing body structure, function, activity limitation and / or participation in recreation  Presentation: MEDIUM Complexity : Evolving with changing characteristics OBJECTIVE DATA SUMMARY:  
 
Vital Signs: 
Resting BP:  BP: 154/75  HR: Pulse (Heart Rate): 73   
Pain: 
  
Gross Assessment: 
Gross Assessment AROM: Generally decreased, functional 
PROM: Generally decreased, functional 
Strength: Generally decreased, functional(R hip 2-5, R knee 4+/5; L hip 4-/5, L knee 4+/5) Coordination: Generally decreased, functional 
Tone: Normal  
Bed Mobility: 
Bed Mobility Supine to Sit: Moderate assistance(for RLE management) Scooting: Minimum assistance Balance: 
Balance Sitting: With support Sitting - Static: Fair (occasional) Sitting - Dynamic: Fair (occasional) Standing: With support; Impaired Standing - Static: Fair Standing - Dynamic : Fair(-) Transfers: 
Sit to Stand: Moderate assistance Gait: 
Gait Base of Support: Center of gravity altered Speed/Tamanna: Slow Step Length: Right shortened;Left shortened(R > L) Gait Abnormalities: Antalgic;Decreased step clearance Ambulation - Level of Assistance: Minimal assistance Distance (ft): 2 Feet (ft) Assistive Device: Gait belt;Walker, rolling Right Side Weight Bearing: Toe touch With 1 turns. Wheelchair Management: N/A Assessment:  
Clinical Decision Making:High Complexity ,  using standardized patient assessment instrument and /or measurable assessement of functional outcome of *[facility] Standing Balance Scale, score of 1+/5.  
0: Pt performs 25% or less of standing activity (Max assist) CN, 100% impaired. 1: Pt supports self with upper extremities but requires therapist assistance. Pt performs 25-50% of effort (Mod assist) CM, 80% to <100% impaired. 1+: Pt supports self with upper extremities but requires therapist assistance. Pt performs >50% effort. (Min assist). CL, 60% to <80% impaired. 2: Pt supports self independently with both upper extremities (walker, crutches, parallel bars). CL, 60% to <80% impaired. 2+: Pt support self independently with 1 upper extremity (cane, crutch, 1 parallel bar). CK, 40% to <60% impaired. 3: Pt stands without upper extremity support for up to 30 seconds. CK, 40% to <60% impaired. 3+: Pt stands without upper extremity support for 30 seconds or greater. CJ, 20% to <40% impaired. 4: Pt independently moves and returns center of gravity 1-2 inches in one plane. CJ, 20% to <40% impaired. 4+: Pt independently moves and returns center of gravity 1-2 inches in multiple planes. CI, 1% to <20% impaired. 5: Pt independently moves and returns center of gravity in all planes greater than 2 inches. CH, 0% impaired. Candance Huger Positioning needs/Additional Comments :  Pt presents in bed, alert and oriented x 3, requiring cueing for date as pt believed it to be March 19, 2019. Pt denies any pain. Bed mobility with mod A for RLE management, required frequent rest breaks due to R hip pain. Pt also c/o \"cuts\", pointing to on posterior thigh. Nurse later presented with pain meds, therapist informed of sores on posterior thigh. Required additional rest break after achieving sitting at EOB. Sit to stand required multiple attempts, initially required min A with non-compliance with TTWB, mod A with TTWB compliance; extensive cueing to maintain TTWB.  Bed to w/c transfer required extensive cueing for TTWB compliance through RLE. TE rendered to improve strength, endurance, mobility and included: heel raises, toe raises, LAQ 10 reps x 2 sets each with visual and verbal cueing for proper techniques. Attempted marching, pt unable due to pain. Education on importance of TTWB compliance. Education on aforementioned exercises as HEP to address circulation, pt verbalized understanding. Education on PT POC, pt had a bit of difficulty relaying his personal goals. After cueing, he stated his goals were to get in and out of bed and walk better. Education on pain management with visual cueing written on whiteboard. No further questions presented. Recommend skilled physical therapy services to address deficits, restore function, and for safe discharge. TREATMENT PLAN: Rehab Potential : Good Skilled Physical Therapy Services may include: ?           Bed Mobility Training             ? Neuromuscular Re-Education ? Transfer Training                   ? Orthotic/Prosthetic Training 
? Gait Training                          ? Modalities ? Therapeutic Procedures        ? Manual Therapy ? Therapeutic Activities            ? Patient and Family Training/Education ? Other (comment): Frequency/Duration: Patient will be followed by physical therapy for 1-2 times a day for a minimum of 3 days per week for 4 weeks to address goals. Discharge Recommendations: Home Health Patient's expected Discharge Location:  ? Private Residence  ? LANRE/ILF  ? LTC  ? Other: COMMUNICATION/EDUCATION:  Patient/Family Agreement with Treatment Plan :  
 
?         The PT evaluation/POC has been reviewed with PT assistant. ?         Fall prevention education was provided and the patient/caregiver indicated understanding. ?          Patient/family have participated as able in goal setting and plan of care and Patient/family agree to work toward stated goals and plan of care. ?         Patient is unable to participate in goal setting and plan of care. Therapist/SW will contact family/POA. Treatment session: 
Overall Complexity: MEDIUM Evaluation:  28 mins./ Treatment:  43 mins. Physical Therapist:   Parul Bassett, PT       4/30/2019 Thank you for this referral.

## 2019-04-30 NOTE — PROGRESS NOTES
Nutrition initial assessment-[facility]/  Plan of care      RECOMMENDATIONS:     1. Diabetic Consistent Carb diet  2. Monitor weight, labs and PO intake  3. RD to follow     GOALS:     1. PO intake meets >75% of protein/calorie needs by 5/7  2. Weight Maintenance (+/- 1-2 lb by 5/7)    ASSESSMENT:     Weight status is classified as normal per BMI of 21. PO intake appears adequate. Labs noted. HbA1C: 6.8%. BG range from 149-239 over past 24 hours. Nutrition recommendations listed. RD to follow. Nutrition Diagnoses: Altered nutrition related lab values related to diabetes as evidenced by HbA1c: 6.8%. Nutrition Risk:  [] High  [] Moderate [x]  Low    SUBJECTIVE/OBJECTIVE:      Transferred from [place] [address] to [facility] on 4/29. Admitted s/p fall with right hip fracture. S/P right hip percutaneous pinning on 4/26. Has history of Alzheimer's disease, diabetes, HTN and a-fib. Patient reports having a good appetite and weight was stable at 145-150 lb PTA. Observed 75% intake of breakfast meal during visit. Denies food allergies and problems with chewing/swallowing. Will monitor.      Information Obtained from:    [x] Chart Review   [x] Patient   [] Family/Caregiver   [x] Nurse/Physician   [] Interdisciplinary Meeting/Rounds    Diet: Diabetic Consistent Carb diet  Medications: [x] Reviewed    Allergies: [x] Reviewed   Patient Active Problem List   Diagnosis Code    Hip fracture ([facility] [address] S72.009A    [facility] S72.001A    Atrial fibrillation ([facility] [address] I48.91    HTN (hypertension) I10     Past Medical History:   Diagnosis Date    Alzheimer's disease     Atrial fibrillation ([facility] [address]     Diabetes ([facility] [address]     Hypertension       Labs:    Lab Results   Component Value Date/Time    Sodium 136 04/29/2019 06:55 PM    Potassium 4.5 04/29/2019 06:55 PM    Chloride 110 (H) 04/29/2019 06:55 PM    CO2 23 04/29/2019 06:55 PM    Anion gap 3 04/29/2019 06:55 PM    Glucose 212 (H) 04/29/2019 06:55 PM    BUN 18 04/29/2019 06:55 PM    Creatinine 0.99 04/29/2019 06:55 PM    Calcium 7.8 (L) 04/29/2019 06:55 PM     Anthropometrics: BMI (calculated): 21  Last 3 Recorded Weights in this Encounter    04/29/19 1920   Weight: 64.4 kg (142 lb)      Ht Readings from Last 1 Encounters:   04/29/19 5' 9\" (1.753 m)     Weight Metrics 4/29/2019 4/26/2019   Weight 142 lb 140 lb   BMI 20.97 kg/m2 22.6 kg/m2     No data found. IBW: 160 lb %IBW: 89%  [] Weight Loss [] Weight Gain [x] Weight Stable (per pt)    Estimated Nutrition Needs: [x] MSJ    Calories: 1700 Kcal Based on:   [x] Actual BW    Protein:   70-84 g Based on:   [x] Actual BW    Fluid:       7844-1222 ml Based on:   [x] Actual BW      [x] No Cultural, Anabaptist or ethnic dietary need identified.     [] Cultural, Anabaptist and ethnic food preferences identified and addressed     Wt Status:  [x] Normal (18.6 - 24.9) [] Underweight (<18.5) [] Overweight (25 - 29.9) [] Mild Obesity (30 - 34.9)  [] Moderate Obesity (35 - 39.9) [] Morbid Obesity (40+)     Nutrition Problems Identified:   [] Suboptimal PO intake   [] Food Allergies  [] Difficulty chewing/swallowing/poor dentition  [] Constipation/Diarrhea   [] Nausea/Vomiting   [x] None  [] Other:     Plan:   [x] Therapeutic Diet  [x]  Obtained/adjusted food preferences/tolerances and/or snacks options   []  Supplements added   [] Occupational therapy following for feeding techniques  []  HS snack added   []  Modify diet texture   []  Modify diet for food allergies   []  Educate patient   []  Assist with menu selection   [x]  Monitor PO intake on meal rounds   [x]  Continue inpatient monitoring and intervention   [x]  Participated in discharge planning/Interdisciplinary rounds/Team meetings   []  Other:     Education Needs:   [] Not appropriate for teaching at this time due to:   [x] Identified and addressed    Nutrition Monitoring and Evaluation:  [x] Continue ongoing monitoring and intervention  [] Chayito Zurita

## 2019-04-30 NOTE — PROGRESS NOTES
[facility]    Daily progress Note    Patient: Diomedes Diaz MRN: [id number]  CSN: [id number]    YOB: 1931  Age: 80 y.o. Sex: male    DOA: 4/29/2019 LOS:  LOS: 1 day                    Subjective:     CC: Follow up Constipation    Ms. Rosemarie Douglass is a 80 yr old female who was recently hospitalized 4/26-4/29. Patient came in ER post mechanical fall at home. XRAY showed  Impacted subcapital right femoral neck fracture with moderately severe right hip joint osteoarthritis. Ortho saw patient and had right hip percutaneous pinning on 4/26 and tolerated. Patient had episode of bradycardia which resolved. Patient coumadin restarted with PT/INR monitoring. Patient improved and was sent to [facility] for rehab. I examined him today, he is sitting up in w/c, NAD. He is alert and verbal, denies any pain, only soreness to right hip, I examine surgical site, its clean, open to air, staples approximated, no redness, or drainage. Patient report no Bm since surgery, on examination + BS, distended, NT. Discussed with patient, will start bowel regiment and monitor.         PAST MEDICAL HX: Alzheimer's Disease, HTN AFIB, DM type 2     PAST SURGICAL Hx: hernia repair    SOCIAL Hx:     ALLERGIES: NKDA    ROSCONST: Fever, weight loss, fatigue or chills  HEENT: Recent changes in vision, vertigo  CV: Chest pain, palpitations, edema and varicosities  RESP: Cough, shortness of breath, wheezing  GI: Nausea, vomiting, abdominal pain, change in bowel habits,  : Hematuria, dysuria, frequency  MS: Weakness, right hip sorness  LYMPH/HEME: Anemia, bruising and history of blood transfusions  INTEG: Dermatitis, abnormal moles  NEURO: Dizziness, headache, fainting, seizures and stroke  PSYCH: Anxiety and depression      Objective:      Visit Vitals  /75 (BP 1 Location: Left arm, BP Patient Position: At rest;Supine)   Pulse 73   Temp 97.6 °F (36.4 °C)   Resp 20   Ht 5' 9\" (1.753 m)   Wt 64.4 kg (142 lb)   SpO2 91% BMI 20.97 kg/m²       Physical Exam:  General appearance: alert, cooperative, no distress, appears stated age  [de-identified]: negative  Neck: supple, symmetrical, trachea midline, no adenopathy, thyroid: not enlarged, symmetric, no tenderness/mass/nodules, no carotid bruit and no JVD  Lungs: clear to auscultation bilaterally  Heart: regular rate and rhythm, S1, S2 normal, no murmur, click, rub or gallop  Abdomen: soft, non-tender. Bowel sounds normal. No masses,  no organomegaly  Pulses: 2+ and symmetric  Skin: Skin color, texture, turgor normal. No rashes or lesions 7 staples to right hip      Intake and Output:  Current Shift:  No intake/output data recorded.   Last three shifts:  04/28 1901 - 04/30 0700  In: -   Out: 400 [Urine:400]    Recent Results (from the past 24 hour(s))   GLUCOSE, POC    Collection Time: 04/29/19  5:30 PM   Result Value Ref Range    Glucose (POC) 181 (H) 70 - 544 mg/dL   METABOLIC PANEL, BASIC    Collection Time: 04/29/19  6:55 PM   Result Value Ref Range    Sodium 136 136 - 145 mmol/L    Potassium 4.5 3.5 - 5.5 mmol/L    Chloride 110 (H) 100 - 108 mmol/L    CO2 23 21 - 32 mmol/L    Anion gap 3 3.0 - 18 mmol/L    Glucose 212 (H) 74 - 99 mg/dL    BUN 18 7.0 - 18 MG/DL    Creatinine 0.99 0.6 - 1.3 MG/DL    BUN/Creatinine ratio 18 12 - 20      GFR est AA >60 >60 ml/min/1.73m2    GFR est non-AA >60 >60 ml/min/1.73m2    Calcium 7.8 (L) 8.5 - 10.1 MG/DL   GLUCOSE, POC    Collection Time: 04/29/19  9:55 PM   Result Value Ref Range    Glucose (POC) 176 (H) 70 - 110 mg/dL   GLUCOSE, POC    Collection Time: 04/30/19  5:05 AM   Result Value Ref Range    Glucose (POC) 149 (H) 70 - 110 mg/dL         Current Facility-Administered Medications:     oxyCODONE-acetaminophen (PERCOCET) 5-325 mg per tablet 1 Tab, 1 Tab, Oral, Q4H PRN, Carlton Harrison MD, 1 Tab at 04/30/19 0859    oxyCODONE-acetaminophen (PERCOCET 10)  mg per tablet 2 Tab, 2 Tab, Oral, Q4H PRN, [facility]    warfarin (COUMADIN) tablet 2 mg, 2 mg, Oral, QPM, Soniya Bruce MD, 2 mg at 04/29/19 1821    cholecalciferol (VITAMIN D3) tablet 1,000 Units, 1,000 Units, Oral, DAILY, Soniya Bruce MD, 1,000 Units at 04/30/19 0858    glimepiride (AMARYL) tablet 4 mg, 4 mg, Oral, ACB, Soniya Bruce MD, 4 mg at 04/30/19 0858    lisinopril (PRINIVIL, ZESTRIL) tablet 10 mg, 10 mg, Oral, DAILY, Soniya Bruce MD, 10 mg at 04/30/19 0858    metFORMIN (GLUCOPHAGE) tablet 1,000 mg, 1,000 mg, Oral, BID WITH MEALS, Soniya Bruce MD, 1,000 mg at 04/30/19 0858    pantoprazole (PROTONIX) tablet 40 mg, 40 mg, Oral, ACB, Soniya Bruce MD, 40 mg at 04/30/19 0858    insulin lispro (HUMALOG) injection, , SubCUTAneous, AC&HS, Soniya Bruce MD, Stopped at 04/30/19 0730    flaxseed oil cap 1,000 mg (Patient Supplied), 1,000 mg, Oral, DAILY, Soniya Bruce MD, 1,000 mg at 04/30/19 0900    WARFARIN INFORMATION NOTE (COUMADIN), , Other, PRN, Soniya Bruce MD    simvastatin (ZOCOR) tablet 20 mg, 20 mg, Oral, QHS, Latosha Xiao MD, 20 mg at 04/29/19 2151    magnesium hydroxide (MILK OF MAGNESIA) 400 mg/5 mL oral suspension 30 mL, 30 mL, Oral, DAILY PRN, Latosha Xiao MD, 30 mL at 04/29/19 2300    bisacodyl (DULCOLAX) suppository 10 mg, 10 mg, Rectal, DAILY PRN, Sonyia Bruce MD    Lab Results   Component Value Date/Time    Glucose 212 (H) 04/29/2019 06:55 PM    Glucose 181 (H) 04/27/2019 04:10 AM    Glucose 95 04/26/2019 05:44 AM        Assessment/Plan   Closed right hip fracture: s/p right hip percutaneous pinning on 4/26. Staples out in 14 days. Orthopedic surgery in 1 month    Constipated: will add colace + Miralax daily, and prn dulcolax suppository     AFIB: continue Coumadin with PT/INR monitoring  Monday and Thursday    DM type 2 , hgab1c 6.8 on 4/26, will continue metformin 1000 mg bid + Amaryl 4 mg, will monitor for hypoglycemia episodes. Renal function stable.  May hold Amaryl if hypoglycemic episodes     HTN; BP stable on Lisinopril     Hyperlipidemia: continue Zocor     Continue PT/OT    FLU: he report that he received FLU shot this year, not sure about month     Eunice Lake NP  4/30/2019, 11:40 AM

## 2019-04-30 NOTE — PROGRESS NOTES
Problem: Self Care Deficits Care Plan (Adult) Goal: *Acute Goals and Plan of Care (Insert Text) Description OCCUPATIONAL THERAPY SHORT TERM GOALS Initiated 4/30/2019 and to be accomplished within 2 [facility]) 1. Patient will perform Upper body ADL's without adaptive equipment with modified independence. 2.  Patient will perform Lower body ADL's with adaptive equipment with moderate assistance. 3.  Patient will perform toileting task with moderate assistance  with Good safety to reduce falls risk. 4.  Patient will perform bedside commode transfers with Adriana Reges and minimal assistance while adhering to RLE TTWB. 5.  Patient will perform standing static/dynamic balance activities for improved ADL function with minimal assistance and Fair+ balance and safety awareness while adhering to RLE TTWB. 6.  Patient will improve Barthel index scores to atleast 91240 to improve functional mobility. 7.  Patient will utilize energy conservation techniques during functional activities with minimal verbal cues. OCCUPATIONAL THERAPY LONG TERM GOALS Initiated 4/30/2019 and to be accomplished within 4 Week(s) 1. Patient will perform ADL's with adaptive equipment as needed with modified independence. 2.  Patient will perform toileting task with modified independence with Good safety to reduce falls risk. 3.  Patient will perform all functional transfers utilizing least restrictive device and durable medical equipment as needed with modified independence and Good balance and safety awareness. 4.  Patient will perform standing static/dynamic activity for improved ADL function with modified independence and Good balance and safety awareness. 5.  Patient will improve Barthel index score to 95/100 to improve independence with mobility. 6. Patient will perform UE HEP with Modified [place] to increase BUE strength for continued participation in ADLs. Therapist: Hans Angeles    4/30/2019 Outcome: Progressing Towards Goal 
TRANSITIONAL [facility]ENTER  
OCCUPATIONAL THERAPY EVALUATION Patient: Steff Fitch (8 y.o. male) Start of Care Date: 4/30/2019                         Onset Date:  4/26/19 Referred by : Jasvir Lim MD        
Primary Diagnosis: rt hip fracture       Treatment Diagnosis Treatment Diagnosis: need for assist with self care Treatment Diagnosis 2: muscle weakness Treatment Diagnosis: need for assist with self care, muscle weakness Patient is an 80 y.o. Male admitted to [facility] 4/26/19 s/p fall resulting in (R) elbow abrasions, (R) impacted subcapital hip fx and underwent surgical intervention: (R) hip percutaneous pinning, Doctor's order post op: (R)LE TTWB. Patient unsafe to discharge home and transferred to [address] [place] [address],[address] and referred to skilled Occupational therapy in order to reach maximal functional potential for safe discharge home at [facility]. Precautions: Fall, TTWB(TTWB RLE) Eval Complexity: History: HIGH Complexity : Extensive review of history including physical, cognitive and psychosocial history . History of present problem reveals HIGH Complexity :3+ comorbidities / personal factors will impact the outcome/ POC . Past Medical history includes:  
Past Medical History:  
Diagnosis Date  Alzheimer's disease  Atrial fibrillation ([facility]  Diabetes ([facility]  Hypertension No past surgical history on file. Cognitive Status: 
Mental Status Neurologic State: Alert Orientation Level: Oriented to person;Oriented to situation;Disoriented to place; Disoriented to time Cognition: Follows commands Perception: Appears intact Perseveration: No perseveration noted Safety/Judgement: Fall prevention Barriers to Learning/Limitations: yes;  cognitive and sensory deficits-vision/hearing/speech Compensate with: visual, verbal, tactile, kinesthetic cues/model Justification for Evaluation complexity:   HIGH Complexity : Patient presents with comorbidities that affect occupational performance. Signifigant modification of tasks or assistance (eg, physical or verbal) with assessment (s) is necessary to enable patient to complete evaluation . Patient is [facility] to [facility] [address] due to a functional decline in strength, balance, coordination, safety awareness and functional activity tolerance, which is inhibiting independence w/ self-care performance skills and functional mobility. Prior Level of Function/Home Situation:  
Home Situation Home Environment: Private residence # Steps to Enter: 2(front entrance) Rails to Enter: Yes Hand Rails : Bilateral(BHR front, L steps ascending back steps) Wheelchair Ramp: No 
One/Two Story Residence: One story Living Alone: No 
Support Systems: Spouse/Significant Other/Partner, Child(bethany) Patient Expects to be Discharged to[de-identified] Private residence Current DME Used/Available at Home: None Tub or Shower Type: Shower Level of Assistance received at Home: Prior to this current hospitalization, the patient was independent with ADLs and functional mobility w/o AD, Spouse provided total assist with IADLs, meal prep, home making and transportation . OBJECTIVE DATA SUMMARY:  
Vital Signs: 
Resting BP:  BP: 154/75  HR: Pulse (Heart Rate): 73    
Pain: Auditory: Auditory Auditory Impairment: Hard of hearing, left side Examination:  
HIGH Complexity : 5 or more performance deficits relating to physical, cognitive , or psychosocial skils that result in activity limitations and / or participation restrictions; Tone and Sensation: 
Tone: Normal(BUEs) Sensation: Intact(BUEs) Visual Perceptual: 
Vision Corrective Lenses: Reading glasses Coordination: 
Coordination Fine Motor Skills-Upper: Left Intact; Right Intact Gross Motor Skills-Upper: Left Intact; Right Intact Coordination: Within functional limits(BUEs) Fine Motor Skills-Upper: Left Intact; Right Intact Gross Motor Skills-Upper: Left Intact; Right Intact Bed Mobility: 
Bed Mobility Supine to Sit: Moderate assistance(for RLE management) Scooting: Minimum assistance Transfers: 
Functional Transfers Sit to Stand: Moderate assistance Stand to Sit: Moderate assistance Bed to Chair: Minimum assistance Toilet Transfer : Moderate assistance;Assist x1 Balance: 
Balance Sitting: With support Sitting - Static: Fair (occasional) Sitting - Dynamic: Fair (occasional) Standing: With support; Impaired Standing - Static: Fair(-) Standing - Dynamic : Fair(-) Gross Assesment: 
Gross Assessment AROM: Within functional limits(BUEs) PROM: Generally decreased, functional 
Strength: Generally decreased, functional(BUEs 4-/5) Coordination: Within functional limits(BUEs) Tone: Normal(BUEs) Sensation: Intact(BUEs) ADL self care: 
Basic ADL Type of Bath: Basin/Soap/Water ADL Intervention: 
Upper Body Bathing Bathing Assistance: Set-up Upper Body Dressing Assistance Dressing Assistance: Set-up Lower Body Bathing Bathing Assistance: Maximum assistance Toileting Bladder Hygiene: Modified independent Bowel Hygiene: Total assistance (dependent) Clothing Management: Total assistance (dependent) Adaptive Equipment: Walker(BSC) Grooming Grooming Assistance: Set-up Feeding Feeding Assistance: Modified independent Wheelchair Management: 
 Dep Instrumental ADL's: 
  Needs assistance ASSESSMENT:  
A clinical decision making of HIGH  complexity was conducted, which includes an analysis of the Occupational profile, standardized assessments, data and treatment options. THE BARTHEL INDEX 
ACTIVITY 
 SCORE FEEDING 
0=unable 5=needs help cutting,spreading butter,etc., or modified diet 10= independent 10 BATHING 
0=dependent 5=independent (or in shower  
0  
GROOMING 
0=needs help 5=independent face/hair/teeth/shaving (implements provided) 5 DRESSING 
0=dependent 5=needs help but can do about half unaided 10=independent(including buttons, zips,laces etc.) 5 BOWELS 
0=incontinent 5=occasional accident 10=continent 10 BLADDER 
0=incontinent, or catheterized and unable to manage alone 5=occasional accident 10=continent 10 TOILET USE 
0=dependent 5=needs some help, but can do something alone 10=independent (on and off, dressing, wiping) 0 TRANSFER (BED TO CHAIR AND BACK) 0=unable, no sitting balance 5=major help(one or two people,physical), can sit 10=minor help(verbal or physical) 15=independent  
5 MOBILITY (ON LEVEL SURFACES) 0=immobile or <50 yards 5=wheelchair independent,including corners,>50 yards 10=walkes with help of one person (verbal or physical) >50 yards 15=independent(but may use any aid; for example, stick) >50 yards  
0 STAIRS 
0=unable 5=needs help (verbal, physical, carrying aid) 10=independent  
0  
   
      TOTAL:             45/100 Additional comments: Patient presents sitting up in w/c, no c/o pain, spouse present. Oral hygiene: Supv-set up seated w/c level. Patient unable to recall (R)LE TTWB, therapist provided instruction and demonstration in prep for [facility] transfer. Patient mod A w/ verbal cues for maintaining RLE TTWB w/ fair carryover, dep all aspects of toilet tasks w/ dep to thread adult pull up brief, spouse requesting to assist for posterior jaclyn care and hiking adult pull up brief over hips. Patient educated on role of OT and POC; patient verbalized understanding. Pt. Instructed on safety awareness with importance to utilize call bell to request assist with functional transfers to prevent falls, patient verbalized understanding and provided accurate demonstration. Dry erase communication board updated for accuracy w/ carryover for bedside commode transfers:  Mod A w/ RW & gait belt,  
 
 TREATMENT PLAN : Rehab Potential : Excellent Skilled Occupational Therapy Services may include: ? Self Care/Home Mgmt                    ? Cognitive Perceptual Re-training ? Adaptive Equip Training                 ? Therapeutic Exercise ? Sensory Integration                           ? Community Work Integration ? Therapeutic Activities                     ? Other/modalities ? Neuromuscular Re-education         ? Wheelchair Mgmt/propulsion ? Patient/Family/Staff Instruction      : 
?Orthotics/Prosthetic Fitting & training Frequency/Duration: Patient will be followed by Occupational therapy for 1-2 times a day for a minimum of 3 days per week for 4 weeks to address goals. Discharge Recommendations: Home Health Patient expected Discharge Location: ? Private Residence  ? group home  ? LTC  ? [facility] COMMUNICATION/EDUCATION:  Patient/Family Agreement with Treatment Plan :  
 
?   OT Evaluation/POC has been reviewed with the RICHEY. ? Fall prevention education was provided and the patient/caregiver indicated understanding ? Patient/family have participated as able in goal setting and plan of care. Patient/family agree to work toward stated goals and plan of care. ?        Patient is unable to participate in goal setting and plan of care. Therapist will contact SW/POA. Treatment session:  
Overall Complexity:HIGH  Evaluation: 40 mins. Treatment :  45  mins. Occupational Therapist:   Tarsha Ayala          4/30/2019 Thank You for this referral.

## 2019-04-30 NOTE — ROUTINE PROCESS
Patient in bed at present time ,complained of back pain 10/10 ./ Patient medicated for pain. Patient lying flat in bed and education rendered on elevating head of bed to prevent choking when eating or taking medications. Patient verbalized 100% understanding.

## 2019-04-30 NOTE — ROUTINE PROCESS
Bedside and Verbal shift change report given to shaylee damon (oncoming nurse) by Marcos Iverson (offgoing nurse). Report included the following information SBAR, Intake/Output and MAR.

## 2019-04-30 NOTE — PROGRESS NOTES
Patient very cooperative, and participated in a screen which indicated basic cognitive-linguistic function appears intact at this time. Patient denies any swallow problems. SLP educated pt on role of speech therapist in current setting with re: speech/swallow and he voiced understanding.      Ravin Jackson, SLP

## 2019-04-30 NOTE — ROUTINE PROCESS
Patient complained of pain to right hip earlier in shift at start of physical therapy. Patient was just asked what his pain level was and stated that he never had any pain this morning. Patient wife at bedside . Patient up in chair .

## 2019-04-30 NOTE — ROUTINE PROCESS
Patient arrived to floor via bed. Wife at his bedside. Patient alert and oriented. Is very [place] with no hearing aids. Has seven staples to right hip. Otherwise, skin is intact. Oriented to unit and to room. Able to demonstrate proper use of call light. Bed locked and in low position. Call light within reach. Will continue to monitor.

## 2019-05-01 LAB
GLUCOSE BLD STRIP.AUTO-MCNC: 107 MG/DL (ref 70–110)
GLUCOSE BLD STRIP.AUTO-MCNC: 125 MG/DL (ref 70–110)
GLUCOSE BLD STRIP.AUTO-MCNC: 140 MG/DL (ref 70–110)
GLUCOSE BLD STRIP.AUTO-MCNC: 98 MG/DL (ref 70–110)

## 2019-05-01 PROCEDURE — 82962 GLUCOSE BLOOD TEST: CPT

## 2019-05-01 PROCEDURE — 74011250637 HC RX REV CODE- 250/637: Performed by: INTERNAL MEDICINE

## 2019-05-01 PROCEDURE — 74011250637 HC RX REV CODE- 250/637: Performed by: NURSE PRACTITIONER

## 2019-05-01 RX ADMIN — DOCUSATE SODIUM 100 MG: 100 CAPSULE, LIQUID FILLED ORAL at 08:53

## 2019-05-01 RX ADMIN — OXYCODONE HYDROCHLORIDE AND ACETAMINOPHEN 1 TABLET: 5; 325 TABLET ORAL at 12:13

## 2019-05-01 RX ADMIN — WARFARIN SODIUM 2 MG: 2 TABLET ORAL at 17:33

## 2019-05-01 RX ADMIN — METFORMIN HYDROCHLORIDE 1000 MG: 500 TABLET ORAL at 08:53

## 2019-05-01 RX ADMIN — SIMVASTATIN 20 MG: 20 TABLET, FILM COATED ORAL at 21:55

## 2019-05-01 RX ADMIN — LISINOPRIL 10 MG: 10 TABLET ORAL at 08:53

## 2019-05-01 RX ADMIN — CHOLECALCIFEROL (VITAMIN D3) 25 MCG (1,000 UNIT) TABLET 1000 UNITS: at 08:53

## 2019-05-01 RX ADMIN — PANTOPRAZOLE SODIUM 40 MG: 40 TABLET, DELAYED RELEASE ORAL at 08:53

## 2019-05-01 RX ADMIN — METFORMIN HYDROCHLORIDE 1000 MG: 500 TABLET ORAL at 17:33

## 2019-05-01 RX ADMIN — GLIMEPIRIDE 4 MG: 2 TABLET ORAL at 08:53

## 2019-05-01 RX ADMIN — POLYETHYLENE GLYCOL 3350 17 G: 17 POWDER, FOR SOLUTION ORAL at 08:53

## 2019-05-01 RX ADMIN — OXYCODONE HYDROCHLORIDE AND ACETAMINOPHEN 2 TABLET: 10; 325 TABLET ORAL at 22:21

## 2019-05-01 NOTE — ROUTINE PROCESS
Pt denies any pain at this time. Pt states has not brought in flaxseed oil, Pt informed to bring in flaxseed oil, Pt states \"will ask wife to bring in from home\".

## 2019-05-01 NOTE — PROGRESS NOTES
conducted an Initial consultation and Spiritual Assessment for Rose Shelley, who is a 80 y. o.,male. Patients Primary Language is: [place]. According to the patients EMR Congregational Affiliation is: Moravian. The reason the Patient came to the hospital is:   Patient Active Problem List    Diagnosis Date Noted    Hip fracture ([facility] 04/26/2019    Closed right hip fracture, initial encounter ([facility] 04/26/2019    Atrial fibrillation ([facility] 04/26/2019    HTN (hypertension) 04/26/2019        The  provided the following Interventions:  Initiated a relationship of caitre and support. Patient is in Transitional Care. Explored issues of matthew and belief. He confirmed his Nondenominational affiliation as Gia Brady and worships at [address] [place] [address] in [place]. Listened empathically to patient. He does not have any concerns at this time. He was hoping to get discharged tomorrow, but it may take longer. Offered assurance of continued prayer on patient's behalf. Chart reviewed. The following outcomes were achieved:  Patient shared limited information about his medical narrative. Patient expressed gratitude for the 's visit. Assessment:  Patient does not have any Nondenominational or cultural needs that will affect patients preferences in health care. Patient did not indicate any other spiritual or Nondenominational issues which require Spiritual Care Services interventions at this time. Plan:  Chaplains will continue to follow and will provide pastoral care as needed or requested.  recommends bedside caregivers page  on duty if patient shows signs of acute spiritual or emotional distress. The Rev.  [address] [facility] [phone] / [facility] [phone]

## 2019-05-01 NOTE — ROUTINE PROCESS
Pt lying in bed, with eyes open, complaining of \"discomfort\" to right hip, 5/10 on pain number scale. Pt given 1 tab of Percocet. Pt denies any shortness of breath or other complaints at this time. Pt has no obvious signs of obvious distress at this time.

## 2019-05-01 NOTE — ROUTINE PROCESS
Bedside and Verbal shift change report given to Fran Bolivar LPN (oncoming nurse) by Maggie Evans LPN (offgoing nurse). Report included the following information SBAR, Kardex and MAR.

## 2019-05-01 NOTE — PROGRESS NOTES
[facility]   OCCUPATIONAL THERAPY DAILY TREATMENT NOTE      Patient: Earle Swan (8 y.o. male)       Date: 5/1/2019  Attending Physician: Des Acevedo MD  Primary Diagnosis: rt hip fracture    Treatment Diagnosis  Treatment Diagnosis: need for assist with self care  Treatment Diagnosis 2: muscle weakness   Precautions : Precautions at Admission: Fall, TTWB(TTWB RLE)  Vital Signs:  Vital Signs  Temp: 98.1 °F (36.7 °C)  Temp Source: Oral  Pulse (Heart Rate): 70  Resp Rate: 18  O2 Sat (%): 94 %  BP: 136/70  MAP (Calculated): 92     Cognitive Status:  Mental Status  Neurologic State: Alert  Orientation Level: Oriented to person  Cognition: Follows commands  Pain:  Pain Screen  Pain Scale 1: Numeric (0 - 10)  Pain Intensity 1: 5  Pain Onset 1: acute  Pain Location 1: Hip  Pain Orientation 1: Right  Pain Description 1: Aching  Pain Intervention(s) 1: Medication (see MAR); Repositioned  Patient Stated Pain Goal: 0  Pain Reassessment 1: Yes  Pain Scale 1: Numeric (0 - 10)  Gross Assessment:     Coordination:     Bed Mobility:  Bed Mobility  Supine to Sit: Maximum assistance;Assist x2  Scooting: Minimum assistance; Additional time  Transfers:  Functional Transfers  Sit to Stand: Moderate assistance; Additional time  Stand to Sit: Moderate assistance  Bed to Chair: Moderate assistance; Additional time     Balance:  Balance  Sitting: With support  Sitting - Static: Fair (occasional)  Sitting - Dynamic: Fair (occasional)  Standing: With support; Impaired  Standing - Static: Fair  Standing - Dynamic : Fair(((-)))  ADL Self Care:  Basic ADL  Type of Bath: Basin/Soap/Water       Therapeutic Activities:  Co-treated with PT for optimal functional gains with static standing while adhering to TTWB on R LE to increase independence with toileting transfers. Assisted patient with bed mobility and bed <>w/c transfers in order to assess safety and independence during task. See above for levels of A needed.  Patient demonstrated increased pain with all mobility and therapists asked patient's nurse to bring patient pain meds during treatment session for optimal performance. Static standing activity with one hand release in order to increase standing balance and tolerance needed for ADLS. Patient was able to tolerate two trials of standing, 4 mins and 2:30 with Min A for balance and min cueing to adhere to TTWB. Patient/Caregiver Education:    Pt. Karo Dupree Education on see above. ASSESSMENT:  Patient continues to demonstrate the need for skilled Occupational Therapy services to improve static standing balance needed for bathing  Progression toward goals:  ?      Improving appropriately and progressing toward goals  ? Improving slowly and progressing toward goals  ?       Not making progress toward goals and plan of care will be adjusted     Treatment session:   46 minutes    Therapist:    KAIDEN Herrera,  5/1/2019

## 2019-05-01 NOTE — PROGRESS NOTES
[address] [facility]   PHYSICAL THERAPY DAILY TREATMENT NOTE      Patient: Reuben Rowe (83 y.o. male)               Date: 5/1/2019    Physician: Aimee Garibay MD  Primary Diagnosis: rt hip fracture          Treatment Diagnosis  Treatment Diagnosis: need for assist with self care  Treatment Diagnosis 2: muscle weakness  Precautions: Fall, TTWB(TTWB RLE)  Vital Signs  Vital Signs  Temp: 98.1 °F (36.7 °C)  Temp Source: Oral  Pulse (Heart Rate): 70  Resp Rate: 18  O2 Sat (%): 94 %  BP: 136/70  MAP (Calculated): 92     Cognitive Status:  Mental Status  Neurologic State: Alert  Orientation Level: Oriented to person  Cognition: Follows commands  Pain  Pain Screen  Pain Scale 1: Numeric (0 - 10)  Pain Intensity 1: 5  Pain Onset 1: acute  Pain Location 1: Hip  Pain Orientation 1: Right  Pain Description 1: Aching  Pain Intervention(s) 1: Medication (see MAR); Repositioned  Patient Stated Pain Goal: 0  Pain Reassessment 1: Yes  Bed Mobility Training  Bed Mobility Training  Supine to Sit: Maximum assistance;Assist x2  Scooting: Minimum assistance; Additional time  Interventions: Safety awareness training;Verbal cues; Tactile cues  Balance  Sitting: With support  Sitting - Static: Fair (occasional)  Sitting - Dynamic: Fair (occasional)  Standing: With support; Impaired  Standing - Static: Fair  Standing - Dynamic : Fair(((-)))  Transfer Training  Transfer Training  Sit to Stand: Moderate assistance; Additional time  Stand to Sit: Moderate assistance  Bed to Chair: Moderate assistance; Additional time  Sit to Stand: Moderate assistance; Additional time  Gait Training  Right Side Weight Bearing: Toe touch  Treatment: Co-treat with OT to maximize functional gains with bed mobility, sit<>stand and transfers while maintaining TTWB on R LE. Pt presented supine in bed. Pt's wife present at bedside. Per pt's wife, pt with some confusion. Pt pleasant and willing to participate with therapy.  Pt required verbal and tactile cues for supine>sit with therapist assist for bringing R LE towards EOB. Upon sitting at EOB, pt reported R hip to anterior groin pain. Therapist assisted pt with use of call bell to request for pain medication. NSG arrived to administer pain meds during session. Pt with fair balance sitting EOB, prop sitting noted. Sit>stand at [facility] with  Mod A and cues for TTWB, SPT due to difficulty maintaining TTWB at this time. Static standing completed with scale placed under pt's R foot for visual feedback for TTWB compliance. Pt able to adhere with TTWB with verbal cues and weight shifting onto L foot. Pt stood for trails of 4' and 2'34\". Pt able to stand with 1 UE support, noted L lateral lean with single UE support. Seated B LE TE's rendered to promote strength and flexibility for ease of transfers and progression to gait training: LAQ, hip flexion 2x15 (assisted on R LE). Pt remained sitting in w/c at bedside for lunch, call bell at side. Patient/Caregiver Education:   Pt /Caregiver Education on safety and fall prevention. Pt educated on TTWB with all standing activities. Pt with good TTWB compliance with static standing, see note above. ASSESSMENT:  Patient continues to benefit from Skilled PT services to improve bed mobility, sit<>stand, strength, balance, transfers, and gait. Progression toward goals:  ?      Improving appropriately and progressing toward goals  ? Improving slowly and progressing toward goals  ? Not making progress toward goals and plan of care will be adjusted     Treatment session: 46 minutes.   Therapist:   Prateek Shah PTA,          5/1/2019

## 2019-05-01 NOTE — ROUTINE PROCESS
Bedside and Verbal shift change report given to Eduardo Suarez (oncoming nurse) by Nguyen Ludwig RN (offgoing nurse). Report included the following information SBAR, Kardex and Recent Results. Qh visual checks and 24h chart checks done.

## 2019-05-02 ENCOUNTER — PATIENT OUTREACH (OUTPATIENT)
Dept: CASE MANAGEMENT | Age: 84
End: 2019-05-02

## 2019-05-02 LAB
GLUCOSE BLD STRIP.AUTO-MCNC: 113 MG/DL (ref 70–110)
GLUCOSE BLD STRIP.AUTO-MCNC: 147 MG/DL (ref 70–110)
GLUCOSE BLD STRIP.AUTO-MCNC: 167 MG/DL (ref 70–110)
GLUCOSE BLD STRIP.AUTO-MCNC: 214 MG/DL (ref 70–110)
INR PPP: 2.4 (ref 0.8–1.2)
PROTHROMBIN TIME: 26.1 SEC (ref 11.5–15.2)

## 2019-05-02 PROCEDURE — 74011250637 HC RX REV CODE- 250/637: Performed by: INTERNAL MEDICINE

## 2019-05-02 PROCEDURE — 36415 COLL VENOUS BLD VENIPUNCTURE: CPT

## 2019-05-02 PROCEDURE — 74011250637 HC RX REV CODE- 250/637: Performed by: NURSE PRACTITIONER

## 2019-05-02 PROCEDURE — 85610 PROTHROMBIN TIME: CPT

## 2019-05-02 PROCEDURE — 74011636637 HC RX REV CODE- 636/637: Performed by: INTERNAL MEDICINE

## 2019-05-02 PROCEDURE — 82962 GLUCOSE BLOOD TEST: CPT

## 2019-05-02 RX ADMIN — WARFARIN SODIUM 2 MG: 2 TABLET ORAL at 17:48

## 2019-05-02 RX ADMIN — METFORMIN HYDROCHLORIDE 1000 MG: 500 TABLET ORAL at 16:02

## 2019-05-02 RX ADMIN — OXYCODONE HYDROCHLORIDE AND ACETAMINOPHEN 2 TABLET: 10; 325 TABLET ORAL at 05:52

## 2019-05-02 RX ADMIN — OXYCODONE HYDROCHLORIDE AND ACETAMINOPHEN 2 TABLET: 10; 325 TABLET ORAL at 09:55

## 2019-05-02 RX ADMIN — DOCUSATE SODIUM 100 MG: 100 CAPSULE, LIQUID FILLED ORAL at 08:26

## 2019-05-02 RX ADMIN — CHOLECALCIFEROL (VITAMIN D3) 25 MCG (1,000 UNIT) TABLET 1000 UNITS: at 08:26

## 2019-05-02 RX ADMIN — PANTOPRAZOLE SODIUM 40 MG: 40 TABLET, DELAYED RELEASE ORAL at 08:26

## 2019-05-02 RX ADMIN — INSULIN LISPRO 4 UNITS: 100 INJECTION, SOLUTION INTRAVENOUS; SUBCUTANEOUS at 15:53

## 2019-05-02 RX ADMIN — POLYETHYLENE GLYCOL 3350 17 G: 17 POWDER, FOR SOLUTION ORAL at 08:26

## 2019-05-02 RX ADMIN — LISINOPRIL 10 MG: 10 TABLET ORAL at 08:27

## 2019-05-02 RX ADMIN — METFORMIN HYDROCHLORIDE 1000 MG: 500 TABLET ORAL at 08:26

## 2019-05-02 RX ADMIN — SIMVASTATIN 20 MG: 20 TABLET, FILM COATED ORAL at 21:31

## 2019-05-02 RX ADMIN — OXYCODONE HYDROCHLORIDE AND ACETAMINOPHEN 2 TABLET: 10; 325 TABLET ORAL at 21:31

## 2019-05-02 RX ADMIN — INSULIN LISPRO 2 UNITS: 100 INJECTION, SOLUTION INTRAVENOUS; SUBCUTANEOUS at 11:30

## 2019-05-02 NOTE — ROUTINE PROCESS
Patient continues with 7 staples to right hip. Bruising noted around and under thigh. Patient complains of severe pain and noted to be groining. Patient offered pain medication.

## 2019-05-02 NOTE — PROGRESS NOTES
[address] [facility]   OCCUPATIONAL THERAPY DAILY TREATMENT NOTE      Patient: Elisabeth Marie (50 y.o. male)       Date: 5/2/2019  Attending Physician: Soniya Bruce MD  Primary Diagnosis: rt hip fracture    Treatment Diagnosis  Treatment Diagnosis: need for assist with self care  Treatment Diagnosis 2: muscle weakness   Precautions : Precautions at Admission: Fall, TTWB(TTWB RLE)  Vital Signs:  Vital Signs  Temp: 97.7 °F (36.5 °C)  Temp Source: Oral  Pulse (Heart Rate): 60  Resp Rate: 17  O2 Sat (%): 96 %  BP: 115/57  MAP (Calculated): 76     Cognitive Status:  Mental Status  Neurologic State: Alert  Orientation Level: Oriented to person;Oriented to place  Pain:  Pain Screen  Pain Scale 1: Numeric (0 - 10)  Pain Intensity 1: 0  Pain Onset 1: movement  Pain Location 1: Hip  Pain Orientation 1: Right  Pain Description 1: Aching  Pain Intervention(s) 1: Position;Medication (see MAR)  Patient Stated Pain Goal: 0  Pain Reassessment 1: Yes  Pain Scale 1: Numeric (0 - 10)  Gross Assessment:     Coordination:     Bed Mobility:     Transfers:  Functional Transfers  Sit to Stand: Moderate assistance; Additional time     Balance:  Balance  Sitting: With support  Sitting - Static: Fair (occasional)  Sitting - Dynamic: Fair (occasional)  Standing: With support  Standing - Static: Fair  Standing - Dynamic : Fair  ADL Self Care:  Basic ADL  Type of Bath: Basin/Soap/Water    Therapeutic Activities:  Static standing activity with one hand release in order to assess safety and independence during routine. Patient was able to tolerate two trials of standing, 3:30 and 2 mins with CGA today prior to requesting to sit. Patient demonstrated decreased lateral leaning with one hand release today. Patient continues to require increased assistance with sit to stand transfers due to reports of pain initially to complete task. Patient also able to adhere to TTWB on L LE for optimal safety.   Therapeutic Exercises:   UB fredrickator x 10 mins in order to increase functional activity tolerance needed for ADLS. Patient/Caregiver Education:    Pt. Wes Kuhn Education on see above. ASSESSMENT:  Patient continues to demonstrate the need for skilled Occupational Therapy services to improve dynamic standing needed for bathing  Progression toward goals:  x      Improving appropriately and progressing toward goals  ? Improving slowly and progressing toward goals  ?       Not making progress toward goals and plan of care will be adjusted     Treatment session:   46 minutes    Therapist:    KAIDEN Moreau,  5/2/2019

## 2019-05-02 NOTE — H&P
[address] [facility] HISTORY AND PHYSICAL Name:  Samuel Shore 
MR#:   [id number] :  [id number] ACCOUNT #:  [[id number]] ADMIT DATE:  2019 REASON FOR ADMISSION:  Status post fall, right hip fracture. HISTORY OF PRESENT ILLNESS:  The patient is a pleasant 80-year-old male with past medical history significant for hypertension, AFib, who was admitted to the hospital after the patient fell at home and sustained a right hip fracture. The patient then went to the OR for right hip percutaneous pinning. The patient was started on Coumadin for DVT prophylaxis. The patient was seen in consultation by physical therapy and skilled nursing facility was recommended. At the time of my evaluation, the patient is alert, awake, oriented, denies any chest pain, shortness of breath or palpitation. The patient denies any hip pain, back pain or leg pain. PAST MEDICAL HISTORY:  Hypertension, AFib, mild dementia. PAST SURGICAL HISTORY:  The patient states he has never had surgery prior to this one. SOCIAL HISTORY:  The patient is , has six children. He is a nonsmoker, nondrinker. FAMILY HISTORY:  Cancer, per patient \"chest cancer\". ALLERGIES:  NO KNOWN DRUG ALLERGIES. Medication list reviewed. REVIEW OF SYSTEMS:  No fever or chills. No weight loss or headache. No neck pain or sore throat. No chest wall palpitation. No nausea, vomiting, diarrhea. No dysuria or polyuria. No back pain or hip pain. No anxiety or depression. No heat or cold intolerance. PHYSICAL EXAMINATION: 
GENERAL:  This is a thin male in no apparent distress. VITAL SIGNS:  Temperature 97.7, heart rate is 60, respiratory 17, blood pressure 115/57, satting 96%. HEENT:  Normocephalic, atraumatic. Sclerae anicteric. Oropharynx clear. Mucous membrane moist. 
NECK:  No JVD or thyromegaly. HEART:  S1 and S2, regular rate and rhythm. LUNGS:  Decreased breath sounds bilaterally. No wheezing. ABDOMEN:  Nontender, nondistended. Normoactive bowel sounds. EXTREMITIES:  No edema or clubbing. Motor strength is 5/5. Decreased range of motion on the right lower extremity. PLAN: 
1. Right hip fracture status post surgery. Continue with physical therapy, occupational therapy, pain management, Coumadin for DVT prophylaxis. 2.  Atrial fibrillation, currently rate controlled. Currently on anticoagulation, but this may not be beneficial for long-term due to fall risk. 3.  Hypertension on lisinopril. 4.  Diabetes on Glucophage with Accu-Chek stable between 113 and 167. 
5.  Mild dementia. 6.  Hyperlipidemia on Zocor. 7.  Deconditioning. Continue with physical therapy and occupational therapy. Carlos Alberto Delgado MD 
 
 
[id number] 
D:  05/02/2019 12:39 
T:  05/02/2019 12:47 JOB #:  [id number]

## 2019-05-02 NOTE — ROUTINE PROCESS
Patient out of bed in wheel chair at present time. Per patient pain medication effective and pain level is 0.

## 2019-05-02 NOTE — PROGRESS NOTES
[address] [facility] [address]   PHYSICAL THERAPY DAILY TREATMENT NOTE      Patient: Earle Swan (41 y.o. male)               Date: 5/2/2019    Physician: Des Acevedo MD  Primary Diagnosis: rt hip fracture          Treatment Diagnosis  Treatment Diagnosis: need for assist with self care  Treatment Diagnosis 2: muscle weakness  Precautions: Fall, TTWB(TTWB RLE)  Vital Signs  Vital Signs  Temp: 97.7 °F (36.5 °C)  Temp Source: Oral  Pulse (Heart Rate): 60  Resp Rate: 17  O2 Sat (%): 96 %  BP: 115/57  MAP (Calculated): 76  Cognitive Status:  Mental Status  Neurologic State: Alert  Orientation Level: Oriented to person;Oriented to place  Pain  Pain Screen  Pain Scale 1: Numeric (0 - 10)  Pain Intensity 1: 0  Pain Onset 1: movement  Pain Location 1: Hip  Pain Orientation 1: Right  Pain Description 1: Aching  Pain Intervention(s) 1: Position;Medication (see MAR)  Patient Stated Pain Goal: 0  Pain Reassessment 1: Yes  Bed Mobility Training  Balance  Sitting: With support  Sitting - Static: Fair (occasional)  Sitting - Dynamic: Fair (occasional)  Standing: With support  Standing - Static: Fair  Standing - Dynamic : Fair  Transfer Training  Transfer Training  Sit to Stand: Moderate assistance; Additional time  Stand to Sit: Moderate assistance; Additional time  Sit to Stand: Moderate assistance; Additional time  Gait Training  Right Side Weight Bearing: Toe touch  Treatment: pt presented sitting in w/c at bedside. Pt with difficulty with scooting back in w/c and required cues for use of B UE's at armrest for ease. Sit<>stand with Mod A as pt with increased R hip pain today. Pt unable to provided numerical value, and noted pt not due to pain medication at this time. Static standing balance with scale placed under R foot for visual feedback to maintain TTWB. Pt with intermittent increased WB on R, but able to follow cues well to weight shift onto L LE with good TTW compliance.  Pt stood with B to 1 UE support with CGA-Min A for balance. When attempting to take a step forward with L LE, pt with increased WB on R LE as pt unable to fully WB through B UE's. Gait training continues to be limited for this reason. Seated B LE hamstring stretches provided 3x30\". Seated B LE TE's rendered to promote strength and improve overall functional capacity for ease of mobility: HR/TR 2x20, LAQ, hip flexion, ball squeezes, resisted hip abd (orange band) 2x15, visual and verbal cues provided for technique (assistance required for LAQ and hip flexion on R LE). Patient/Caregiver Education:   Pt /Caregiver Education on safety and fall prevention. Pt educated on importance of maintaining TTWB. Pt follows cues well, see note above. ASSESSMENT:  Patient continues to benefit from Skilled PT services to improve bed mobility, sit<>stand, transfers, strength, balance and gait. Progression toward goals:  ?      Improving appropriately and progressing toward goals  ? Improving slowly and progressing toward goals  ? Not making progress toward goals and plan of care will be adjusted     Treatment session: 47 minutes.   Therapist:   Huey Barker PTA,          5/2/2019

## 2019-05-02 NOTE — PROGRESS NOTES
Community Care Team Documentation for Patient in [facility]     Patient discharged from [facility] 4/26/2019 - 4/29/2019 to [facility], [address], on 4/29/2019. Hospital Discharge diagnosis:  Hip fracture. SNF Attending Provider:     Anticipated discharge date from SNF:       PCP : Love Lovett MD    Nurse Navigator:     [facility] rounds completed, updates provided by facility. Low Risk            5       Total Score        3 Has Seen PCP in Last 6 Months (Yes=3, No=0)    2 . Living with Significant Other. Assisted Living. LTAC. SNF. or   Rehab        Criteria that do not apply:    Patient Length of Stay (>5 days = 3)    IP Visits Last 12 Months (1-3=4, 4=9, >4=11)    Pt.  Coverage (Medicare=5 , Medicaid, or Self-Pay=4)    Charlson Comorbidity Score (Age + Comorbid Conditions)      Active Ambulatory Problems     Diagnosis Date Noted    Hip fracture ([address] 04/26/2019    Closed right hip fracture, initial encounter ([address] 04/26/2019    Atrial fibrillation ([address] 04/26/2019    HTN (hypertension) 04/26/2019     Resolved Ambulatory Problems     Diagnosis Date Noted    No Resolved Ambulatory Problems     Past Medical History:   Diagnosis Date    Alzheimer's disease     Atrial fibrillation ([address]     Diabetes ([address]     Hypertension

## 2019-05-02 NOTE — ROUTINE PROCESS
Bedside and Verbal shift change report given to JOAQUIN Abad LPN (oncoming nurse) by Florian Liao RN (offgoing nurse). Report included the following information SBAR, Kardex and Recent Results. Qh visual checks and 24h chart checks done. i

## 2019-05-03 LAB
GLUCOSE BLD STRIP.AUTO-MCNC: 103 MG/DL (ref 70–110)
GLUCOSE BLD STRIP.AUTO-MCNC: 129 MG/DL (ref 70–110)
GLUCOSE BLD STRIP.AUTO-MCNC: 147 MG/DL (ref 70–110)
GLUCOSE BLD STRIP.AUTO-MCNC: 157 MG/DL (ref 70–110)

## 2019-05-03 PROCEDURE — 74011250637 HC RX REV CODE- 250/637: Performed by: INTERNAL MEDICINE

## 2019-05-03 PROCEDURE — 74011636637 HC RX REV CODE- 636/637: Performed by: INTERNAL MEDICINE

## 2019-05-03 PROCEDURE — 74011250637 HC RX REV CODE- 250/637: Performed by: NURSE PRACTITIONER

## 2019-05-03 PROCEDURE — 82962 GLUCOSE BLOOD TEST: CPT

## 2019-05-03 RX ADMIN — WARFARIN SODIUM 2 MG: 2 TABLET ORAL at 17:15

## 2019-05-03 RX ADMIN — METFORMIN HYDROCHLORIDE 1000 MG: 500 TABLET ORAL at 17:15

## 2019-05-03 RX ADMIN — INSULIN LISPRO 2 UNITS: 100 INJECTION, SOLUTION INTRAVENOUS; SUBCUTANEOUS at 21:30

## 2019-05-03 RX ADMIN — PANTOPRAZOLE SODIUM 40 MG: 40 TABLET, DELAYED RELEASE ORAL at 08:25

## 2019-05-03 RX ADMIN — OXYCODONE HYDROCHLORIDE AND ACETAMINOPHEN 1 TABLET: 5; 325 TABLET ORAL at 00:15

## 2019-05-03 RX ADMIN — OXYCODONE HYDROCHLORIDE AND ACETAMINOPHEN 1 TABLET: 5; 325 TABLET ORAL at 03:15

## 2019-05-03 RX ADMIN — METFORMIN HYDROCHLORIDE 1000 MG: 500 TABLET ORAL at 08:25

## 2019-05-03 RX ADMIN — DOCUSATE SODIUM 100 MG: 100 CAPSULE, LIQUID FILLED ORAL at 08:25

## 2019-05-03 RX ADMIN — CHOLECALCIFEROL (VITAMIN D3) 25 MCG (1,000 UNIT) TABLET 1000 UNITS: at 08:25

## 2019-05-03 RX ADMIN — OXYCODONE HYDROCHLORIDE AND ACETAMINOPHEN 1 TABLET: 5; 325 TABLET ORAL at 08:25

## 2019-05-03 RX ADMIN — LISINOPRIL 10 MG: 10 TABLET ORAL at 08:25

## 2019-05-03 RX ADMIN — POLYETHYLENE GLYCOL 3350 17 G: 17 POWDER, FOR SOLUTION ORAL at 08:35

## 2019-05-03 RX ADMIN — SIMVASTATIN 20 MG: 20 TABLET, FILM COATED ORAL at 21:26

## 2019-05-03 NOTE — ROUTINE PROCESS
Patient up in wheelchair most of shift, wife at bedside reports relief of pain with Percocet 5-325 mg at [facility].

## 2019-05-03 NOTE — PROGRESS NOTES
Problem: Self Care Deficits Care Plan (Adult)  Goal: *Acute Goals and Plan of Care (Insert Text)  Description  OCCUPATIONAL THERAPY SHORT TERM GOALS    Initiated 4/30/2019 and to be accomplished within 2 Week(s)    1. Patient will perform Upper body ADL's without adaptive equipment with modified independence. 2.  Patient will perform Lower body ADL's with adaptive equipment with moderate assistance. 3.  Patient will perform toileting task with moderate assistance  with Good safety to reduce falls risk. 4.  Patient will perform bedside commode transfers with Hobert Wong and minimal assistance while adhering to RLE TTWB. 5.  Patient will perform standing static/dynamic balance activities for improved ADL function with minimal assistance and Fair+ balance and safety awareness while adhering to RLE TTWB. 6.  Patient will improve Barthel index scores to atleast 63180 to improve functional mobility. 7.  Patient will utilize energy conservation techniques during functional activities with minimal verbal cues. OCCUPATIONAL THERAPY LONG TERM GOALS   Initiated 4/30/2019 and to be accomplished within 4 Week(s)    1. Patient will perform ADL's with adaptive equipment as needed with modified independence. 2.  Patient will perform toileting task with modified independence with Good safety to reduce falls risk. 3.  Patient will perform all functional transfers utilizing least restrictive device and durable medical equipment as needed with modified independence and Good balance and safety awareness. 4.  Patient will perform standing static/dynamic activity for improved ADL function with modified independence and Good balance and safety awareness. 5.  Patient will improve Barthel index score to 95/100 to improve independence with mobility. 6. Patient will perform UE HEP with Modified [place] to increase BUE strength for continued participation in ADLs.     Therapist: Deborah Gottlieb    4/30/2019 Outcome: Progressing Towards Goal   [facility]   OCCUPATIONAL THERAPY DAILY TREATMENT NOTE      Patient: Ramona Robledo (51 y.o. male)       Date: 5/3/2019  Attending Physician: Gonzalez Ayala MD  Primary Diagnosis: rt hip fracture    Treatment Diagnosis  Treatment Diagnosis: need for assist with self care  Treatment Diagnosis 2: muscle weakness   Precautions : Precautions at Admission: Fall, TTWB(TTWB RLE)  Vital Signs:  Vital Signs  Temp: 98 °F (36.7 °C)  Pulse (Heart Rate): 74  Resp Rate: 20  O2 Sat (%): 96 %  BP: 136/62     Cognitive Status:  Mental Status  Neurologic State: Alert  Orientation Level: Oriented to person;Oriented to place;Oriented to situation  Cognition: Decreased attention/concentration  Pain:  Pain Screen  Pain Scale 1: Numeric (0 - 10)  Pain Intensity 1: 5  Pain Location 1: Hip  Pain Orientation 1: Right  Pain Description 1: Aching  Pain Intervention(s) 1: Medication (see MAR); Emotional support;Food;Distraction  Pain Scale 1: Numeric (0 - 10)  Gross Assessment:     Coordination:     Bed Mobility:  Bed Mobility  Supine to Sit: Maximum assistance; Additional time  Scooting: Minimum assistance; Additional time  Transfers:  Functional Transfers  Sit to Stand: Moderate assistance; Additional time  Stand to Sit: Moderate assistance; Additional time  Bed to Chair: Maximum assistance; Additional time     Balance:  Balance  Sitting: With support  Sitting - Static: Fair (occasional)  Sitting - Dynamic: Fair (occasional)(((-)))  Standing: With support  Standing - Static: Fair  Standing - Dynamic : Poor  ADL Self Care:     ADL Intervention:           Toileting  Bladder Hygiene: Modified independent(with vc's to initiate, pt set up urinal lying supine in bed)  Clothing Management: Maximum assistance(don and hike adult pull up brief lying supine, roll R <-> L)     Lower Body Dressing Assistance  Pants With Elastic Waist: Maximum assistance(doff lying supine in bed)        Therapeutic Activities:  Patient presents sitting edge of bed, pants down around thighs and pt reports he ambulated to bathroom to have BM w/o calling for assist, without use of RW and not maintaining RLE TTWB, patient reports need for new adult brief, indicating soiled brief on bed next to him, reports pain R LE-nursing notified of pt's statement and poor safety awareness. Therapeutic Exercises:  Patient participated in [facility] [address] lying supine in bed with head of bed elevated: 4.4# weighted ball x15 reps x 2 sets for bicep curls, tricep extension and shoulder circumduction, green theraband x 15 reps x 2 sets for shoulder horizontal abduction, good tolerance in order to increase UB and strength for improved ADL performance and functional transfers. Patient/Caregiver Education:    Pt. Provided repetition with instruction on safety awareness with importance to utilize call bell to request assist with functional transfers to prevent falls, patient verbalized understanding and provided accurate demonstration. ASSESSMENT:  Patient continues to demonstrate the need for skilled Occupational Therapy services to improve grooming, bathing, upper body dressing, lower body dressing, toileting and toilet transfer  Progression toward goals:  ?      Improving appropriately and progressing toward goals  ? Improving slowly and progressing toward goals  ?       Not making progress toward goals and plan of care will be adjusted     Treatment session:   30 minutes    Therapist:    Ashleigh Barrios,  5/3/2019

## 2019-05-03 NOTE — ROUTINE PROCESS
0315 percocoet 5-325mg tablet, 1 tablet prn for pain, given an hour early, per patient's request, due to right hip pain 5/10. Last dose given 5/3/2019 at 0015hr.

## 2019-05-03 NOTE — PROGRESS NOTES
[address] [facility]   PHYSICAL THERAPY DAILY TREATMENT NOTE      Patient: Pascual Single (76 y.o. male)               Date: 5/3/2019    Physician: Harris Bridges MD  Primary Diagnosis: rt hip fracture          Treatment Diagnosis  Treatment Diagnosis: need for assist with self care  Treatment Diagnosis 2: muscle weakness  Precautions: Fall, TTWB(TTWB RLE)  Vital Signs  Vital Signs  Temp: 98 °F (36.7 °C)  Pulse (Heart Rate): 74  Resp Rate: 20  O2 Sat (%): 96 %  BP: 136/62  Cognitive Status:  Mental Status  Neurologic State: Alert  Orientation Level: Oriented to person;Oriented to place;Oriented to situation  Cognition: Decreased attention/concentration  Pain  Pain Screen  Pain Scale 1: Numeric (0 - 10)  Pain Intensity 1: 5  Pain Location 1: Hip  Pain Orientation 1: Right  Pain Description 1: Aching  Pain Intervention(s) 1: Medication (see MAR); Emotional support;Food;Distraction  Bed Mobility Training  Bed Mobility Training  Supine to Sit: Maximum assistance; Additional time  Scooting: Minimum assistance; Additional time  Interventions: Safety awareness training; Tactile cues; Verbal cues;Manual cues  Balance  Sitting: With support  Sitting - Static: Fair (occasional)  Sitting - Dynamic: Fair (occasional)(((-)))  Standing: With support  Standing - Static: Fair  Standing - Dynamic : Poor  Transfer Training  Transfer Training  Sit to Stand: Moderate assistance; Additional time  Stand to Sit: Moderate assistance; Additional time  Bed to Chair: Maximum assistance; Additional time  Sit to Stand: Moderate assistance; Additional time  Gait Training  Right Side Weight Bearing: Toe touch  Treatment: Pt presented supine in bed. Bed mobility to include instruction on bridging technique with minimal to no weight on R LE due to TTWB precaution. Pt required verbal and tactile cues for partial bridging to allow for ease of donning pants. Pt required much additional time for supine>sit with Max A.  Pt remains unable to bring R LE towards EOB. Pt initially sitting at EOB with strong L lateral lean and required several minutes to achieve upright seated posture. Pt sat EOB for ~5-6' with B UE support (prop sitting) and CGA. Sit>stand with Mod A. Pt stood for ~2' with cues for TTWB on R and B UE support with CGA. SPT EOB>w/c with Mod A. Seated B LE TE's rendered to promote strength for improved functional mobility: HR/TR, LAQ (assisted on R LE), hip flex (minimial range on R LE), ball squeezes, resisted hip abd (orange band), resisted hamstring curls on L only with orange band, heel slides on R LE 2x15. Pt required verbal and visual cues for proper technique with all TE's to maximize functional gains. Patient/Caregiver Education:   Pt /Caregiver Education on safety and fall prevention. Pt educated on safety with transfers as pt holding onto bed rail in unsafe manner, see note above. ASSESSMENT:  Patient continues to benefit from Skilled PT services to improve bed mobility, sit<>stand, strength, balance and gait. Progression toward goals:  ?      Improving appropriately and progressing toward goals  ? Improving slowly and progressing toward goals  ? Not making progress toward goals and plan of care will be adjusted     Treatment session: 46 minutes.   Therapist:   Terri Car PTA,          5/3/2019

## 2019-05-04 LAB
GLUCOSE BLD STRIP.AUTO-MCNC: 129 MG/DL (ref 70–110)
GLUCOSE BLD STRIP.AUTO-MCNC: 146 MG/DL (ref 70–110)
GLUCOSE BLD STRIP.AUTO-MCNC: 269 MG/DL (ref 70–110)
GLUCOSE BLD STRIP.AUTO-MCNC: 78 MG/DL (ref 70–110)

## 2019-05-04 PROCEDURE — 74011636637 HC RX REV CODE- 636/637: Performed by: INTERNAL MEDICINE

## 2019-05-04 PROCEDURE — 74011250637 HC RX REV CODE- 250/637: Performed by: NURSE PRACTITIONER

## 2019-05-04 PROCEDURE — 74011250637 HC RX REV CODE- 250/637: Performed by: INTERNAL MEDICINE

## 2019-05-04 PROCEDURE — 82962 GLUCOSE BLOOD TEST: CPT

## 2019-05-04 RX ADMIN — METFORMIN HYDROCHLORIDE 1000 MG: 500 TABLET ORAL at 09:06

## 2019-05-04 RX ADMIN — OXYCODONE HYDROCHLORIDE AND ACETAMINOPHEN 1 TABLET: 5; 325 TABLET ORAL at 12:17

## 2019-05-04 RX ADMIN — OXYCODONE HYDROCHLORIDE AND ACETAMINOPHEN 2 TABLET: 10; 325 TABLET ORAL at 23:17

## 2019-05-04 RX ADMIN — METFORMIN HYDROCHLORIDE 1000 MG: 500 TABLET ORAL at 16:42

## 2019-05-04 RX ADMIN — INSULIN LISPRO 6 UNITS: 100 INJECTION, SOLUTION INTRAVENOUS; SUBCUTANEOUS at 12:04

## 2019-05-04 RX ADMIN — LISINOPRIL 10 MG: 10 TABLET ORAL at 09:06

## 2019-05-04 RX ADMIN — WARFARIN SODIUM 2 MG: 2 TABLET ORAL at 17:32

## 2019-05-04 RX ADMIN — CHOLECALCIFEROL (VITAMIN D3) 25 MCG (1,000 UNIT) TABLET 1000 UNITS: at 09:06

## 2019-05-04 RX ADMIN — OXYCODONE HYDROCHLORIDE AND ACETAMINOPHEN 2 TABLET: 10; 325 TABLET ORAL at 16:43

## 2019-05-04 RX ADMIN — SIMVASTATIN 20 MG: 20 TABLET, FILM COATED ORAL at 21:06

## 2019-05-04 RX ADMIN — POLYETHYLENE GLYCOL 3350 17 G: 17 POWDER, FOR SOLUTION ORAL at 09:06

## 2019-05-04 RX ADMIN — PANTOPRAZOLE SODIUM 40 MG: 40 TABLET, DELAYED RELEASE ORAL at 09:06

## 2019-05-04 RX ADMIN — DOCUSATE SODIUM 100 MG: 100 CAPSULE, LIQUID FILLED ORAL at 09:06

## 2019-05-04 NOTE — ROUTINE PROCESS
Written shift change report given to Suad (oncoming nurse) by Gary Arriola RN (offgoing nurse). Report included the following information SBAR, Kardex and MAR.

## 2019-05-04 NOTE — PROGRESS NOTES
@ 1217 Percocet 5-325mg po for complaints of hip pain. Sat up in wheelchair 75% shift wife in to visit.

## 2019-05-04 NOTE — PROGRESS NOTES
[address] [facility]   PHYSICAL THERAPY DAILY TREATMENT NOTE      Patient: Phan Alvarez (72 y.o. male)               Date: 5/4/2019    Physician: Gaby Abrams MD  Primary Diagnosis: rt hip fracture          Treatment Diagnosis  Treatment Diagnosis: need for assist with self care  Treatment Diagnosis 2: muscle weakness  Precautions: Fall, TTWB(TTWB RLE)  Vital Signs  Vital Signs  Temp: 98.5 °F (36.9 °C)  Temp Source: Oral  Pulse (Heart Rate): 60  Resp Rate: 17  O2 Sat (%): 95 %  BP: 136/61  MAP (Calculated): 86     Cognitive Status:     Pain  Pain Screen  Pain Scale 1: Numeric (0 - 10)  Pain Intensity 1: 0(Reports pain at with movement)  Patient Stated Pain Goal: 0  Bed Mobility Training  Bed Mobility Training  Supine to Sit: Moderate assistance  Scooting: Minimum assistance  Balance  Sitting - Static: Normal   Standing - Static: Fair;Occassional  Transfer Training  Transfer Training  Sit to Stand: Contact guard assistance;Minimum assistance  Stand to Sit: Contact guard assistance;Minimum assistance  Bed to Chair: Contact guard assistance;Minimum assistance  Sit to Stand: Contact guard assistance;Minimum assistance  Gait Training  Gait  Ambulation - Level of Assistance: Contact guard assistance  Distance (ft): 40 Feet (ft)  Assistive Device: Walker, rolling                  With 4 turns. Treatment:   OT staff present during treatment to maximize gains and ensure safety during transfers. Demonstrated fair technique with bed mobility requiring cues for hand placement and to honor weight bearing precautions. Most assistance required for moving RLE during bed mobility due to current weakness and pain with movement. Pt completed exercise in sitting x 20 reps to improve LE strength. Fair standing balance being able to tolerate 2 mins of static standing. Able to ambulate short distances with cues for weight bearing, exhibits a safe pace during task.  Therapeutic rest breaks needed during treatment to minimize fatigue. Patient/Caregiver Education:   Pt education on safety and fall prevention was provided to ensure safety during transfers. ASSESSMENT:  Patient continues to benefit from Skilled PT services to improve LE strength and mobility  Progression toward goals:  ?      Improving appropriately and progressing toward goals  ? Improving slowly and progressing toward goals  ? Not making progress toward goals and plan of care will be adjusted     Treatment session: 55 minutes.   Therapist:   Rufino Lebron PTA,          5/4/2019

## 2019-05-04 NOTE — PROGRESS NOTES
Pt in bed majority of the shift per his request, pt feed himself dinner, pt denies pain, call bell and personal items within reach, no attempts to get OOB without assist

## 2019-05-04 NOTE — PROGRESS NOTES
Problem: Self Care Deficits Care Plan (Adult)  Goal: *Acute Goals and Plan of Care (Insert Text)  Description  OCCUPATIONAL THERAPY SHORT TERM GOALS    Initiated 4/30/2019 and to be accomplished within 2 Week(s)    1. Patient will perform Upper body ADL's without adaptive equipment with modified independence. 2.  Patient will perform Lower body ADL's with adaptive equipment with moderate assistance. 3.  Patient will perform toileting task with moderate assistance  with Good safety to reduce falls risk. 4.  Patient will perform bedside commode transfers with Cookie Dotson and minimal assistance while adhering to RLE TTWB. 5.  Patient will perform standing static/dynamic balance activities for improved ADL function with minimal assistance and Fair+ balance and safety awareness while adhering to RLE TTWB. 6.  Patient will improve Barthel index scores to atleast [id number] to improve functional mobility. 7.  Patient will utilize energy conservation techniques during functional activities with minimal verbal cues. OCCUPATIONAL THERAPY LONG TERM GOALS   Initiated 4/30/2019 and to be accomplished within 4 Week(s)    1. Patient will perform ADL's with adaptive equipment as needed with modified independence. 2.  Patient will perform toileting task with modified independence with Good safety to reduce falls risk. 3.  Patient will perform all functional transfers utilizing least restrictive device and durable medical equipment as needed with modified independence and Good balance and safety awareness. 4.  Patient will perform standing static/dynamic activity for improved ADL function with modified independence and Good balance and safety awareness. 5.  Patient will improve Barthel index score to 95/100 to improve independence with mobility. 6. Patient will perform UE HEP with Modified [place] to increase BUE strength for continued participation in ADLs.     Therapist: Cortez Johnson    4/30/2019 Note:   [facility]   OCCUPATIONAL THERAPY DAILY TREATMENT NOTE      Patient: Rigo Mora (9 y.o. male)       Date: 5/4/2019  Attending Physician: Marco Antonio Franco MD  Primary Diagnosis: rt hip fracture    Treatment Diagnosis  Treatment Diagnosis: need for assist with self care  Treatment Diagnosis 2: muscle weakness   Precautions : Precautions at Admission: Fall, TTWB(TTWB RLE)  Vital Signs:  Vital Signs  Temp: 98.5 °F (36.9 °C)  Temp Source: Oral  Pulse (Heart Rate): 60  Resp Rate: 17  O2 Sat (%): 95 %  BP: 136/61  MAP (Calculated): 86     Cognitive Status:     Pain:  Pain Screen  Pain Scale 1: Numeric (0 - 10)  Pain Intensity 1: 0(Reports pain at with movement)  Patient Stated Pain Goal: 0  Pain Scale 1: Numeric (0 - 10)  Gross Assessment:     Coordination:     Bed Mobility:  Bed Mobility  Supine to Sit: Moderate assistance  Scooting: Minimum assistance  Transfers:  Functional Transfers  Sit to Stand: Contact guard assistance;Minimum assistance  Stand to Sit: Contact guard assistance  Bed to Chair: Contact guard assistance;Minimum assistance  Toilet Transfer : Contact guard assistance  Functional Transfers  Toilet Transfer : Contact guard assistance  Balance:  Balance  Sitting: With support  Sitting - Static: Fair (occasional)  Sitting - Dynamic: Fair (occasional)  Standing: With support  Standing - Static: Fair  Standing - Dynamic : Fair  ADL Self Care:  Basic ADL  Toileting: Minimum assistance(A with clothing mgmt)CGA    Therapeutic Activities:  Pt performed Static standing task CGA with RW and B UE support ~ 2 mins w/ mod cueing to maintain TTWB on R LE to increase standing tolerance needed for toileting routine. Fnxl room and bathroom mobility with RW ~ 20 ft x 2 w/ mod cueing to maintain TTWB on R LE to increase endurance and activity tolerance needed for ADLS. Pt demo toileting routine requiring extended time 2/2 BM with MIN A for clothing mgmt.  Pt requires constant cueing for proper hand placement during all fnxl transfer tasks and to maintain TTWB on R LE. Therapeutic Exercises:   BUE ex with arm bike x 10 mins to increase strength and endurance to aid with all fnxl transfer tasks. Patient/Caregiver Education:    Pt educated on importance of adhering to TTWB on R LE to achieve optimal fnxl gains. ASSESSMENT:  Patient continues to demonstrate the need for skilled Occupational Therapy services to improve strength, balance, and endurance. Progression toward goals:  ?      Improving appropriately and progressing toward goals  ? Improving slowly and progressing toward goals  ? Not making progress toward goals and plan of care will be adjusted     Treatment session:   46 minutes, partial PT co tx to maximize safety with balance and during all fnxl transfer tasks. OT addressing balance and standing tolerance needed for toileting routine.       Therapist:    KAIDEN Montalvo,  5/4/2019

## 2019-05-04 NOTE — ROUTINE PROCESS
Bedside and Verbal shift change report given to Pablo Santiago rn (oncoming nurse) by yumiko rn (offgoing nurse). Report included the following information Kardex, MAR and Recent Results.

## 2019-05-05 LAB
GLUCOSE BLD STRIP.AUTO-MCNC: 124 MG/DL (ref 70–110)
GLUCOSE BLD STRIP.AUTO-MCNC: 133 MG/DL (ref 70–110)
GLUCOSE BLD STRIP.AUTO-MCNC: 156 MG/DL (ref 70–110)
GLUCOSE BLD STRIP.AUTO-MCNC: 185 MG/DL (ref 70–110)

## 2019-05-05 PROCEDURE — 74011250637 HC RX REV CODE- 250/637: Performed by: NURSE PRACTITIONER

## 2019-05-05 PROCEDURE — 74011636637 HC RX REV CODE- 636/637: Performed by: INTERNAL MEDICINE

## 2019-05-05 PROCEDURE — 74011250637 HC RX REV CODE- 250/637: Performed by: INTERNAL MEDICINE

## 2019-05-05 PROCEDURE — 82962 GLUCOSE BLOOD TEST: CPT

## 2019-05-05 RX ADMIN — OXYCODONE HYDROCHLORIDE AND ACETAMINOPHEN 2 TABLET: 10; 325 TABLET ORAL at 21:07

## 2019-05-05 RX ADMIN — OXYCODONE HYDROCHLORIDE AND ACETAMINOPHEN 1 TABLET: 5; 325 TABLET ORAL at 08:26

## 2019-05-05 RX ADMIN — OXYCODONE HYDROCHLORIDE AND ACETAMINOPHEN 1 TABLET: 5; 325 TABLET ORAL at 14:35

## 2019-05-05 RX ADMIN — METFORMIN HYDROCHLORIDE 1000 MG: 500 TABLET ORAL at 08:26

## 2019-05-05 RX ADMIN — OXYCODONE HYDROCHLORIDE AND ACETAMINOPHEN 1 TABLET: 5; 325 TABLET ORAL at 16:11

## 2019-05-05 RX ADMIN — SIMVASTATIN 20 MG: 20 TABLET, FILM COATED ORAL at 21:07

## 2019-05-05 RX ADMIN — WARFARIN SODIUM 2 MG: 2 TABLET ORAL at 17:18

## 2019-05-05 RX ADMIN — METFORMIN HYDROCHLORIDE 1000 MG: 500 TABLET ORAL at 16:11

## 2019-05-05 RX ADMIN — CHOLECALCIFEROL (VITAMIN D3) 25 MCG (1,000 UNIT) TABLET 1000 UNITS: at 08:25

## 2019-05-05 RX ADMIN — INSULIN LISPRO 2 UNITS: 100 INJECTION, SOLUTION INTRAVENOUS; SUBCUTANEOUS at 21:08

## 2019-05-05 RX ADMIN — PANTOPRAZOLE SODIUM 40 MG: 40 TABLET, DELAYED RELEASE ORAL at 08:26

## 2019-05-05 RX ADMIN — DOCUSATE SODIUM 100 MG: 100 CAPSULE, LIQUID FILLED ORAL at 08:26

## 2019-05-05 RX ADMIN — POLYETHYLENE GLYCOL 3350 17 G: 17 POWDER, FOR SOLUTION ORAL at 08:25

## 2019-05-05 RX ADMIN — LISINOPRIL 10 MG: 10 TABLET ORAL at 08:26

## 2019-05-05 RX ADMIN — INSULIN LISPRO 2 UNITS: 100 INJECTION, SOLUTION INTRAVENOUS; SUBCUTANEOUS at 11:35

## 2019-05-05 NOTE — PROGRESS NOTES
[facility] [facility]   PHYSICAL THERAPY DAILY TREATMENT NOTE      Patient: Phan Alvarez (86 y.o. male)               Date: 5/5/2019    Physician: Gaby Abrams MD  Primary Diagnosis: rt hip fracture          Treatment Diagnosis  Treatment Diagnosis: need for assist with self care  Treatment Diagnosis 2: muscle weakness  Precautions: Fall, TTWB(TTWB RLE)  Vital Signs  Vital Signs  Temp: 97.5 °F (36.4 °C)  Temp Source: Oral  Pulse (Heart Rate): 68  Cardiac Rhythm: Atrial fibrillation  Resp Rate: 18  BP: 133/65  MAP (Calculated): 88     Cognitive Status:  Mental Status  Neurologic State: Alert;Confused  Orientation Level: Oriented to person;Oriented to place  Cognition: Decreased attention/concentration  Pain  Pain Screen  Pain Scale 1: Numeric (0 - 10)  Pain Intensity 1: 0  Pain Onset 1: Movement(Reports no pain a rest)  Pain Location 1: Hip  Pain Orientation 1: Right  Pain Description 1: Aching  Pain Intervention(s) 1: Medication (see MAR); Emotional support;Distraction;Food;Environmental changes;Repositioned  Patient Stated Pain Goal: 0  Pain Reassessment 1: Patient sleeping  Bed Mobility Training     Balance  Sitting - Static: Normal   Standing - Static: Fair  Transfer Training  Transfer Training  Sit to Stand: Minimum assistance  Stand to Sit: Minimum assistance  Bed to Chair: Minimum assistance  Sit to Stand: Minimum assistance  Gait Training  Gait  Ambulation - Level of Assistance: Contact guard assistance(TTWB RLE)  Distance (ft): 101 Feet (ft)  Assistive Device: Walker, rolling; Other (comment)(parallel bars)                  With 2 turns. Treatment:    Tolerated treatment being able perform sit to stand transfer (multiple reps) with fair technique requiring some cues for weight bearing status as well as foot placement to ensure safety. Performed exercises in sitting x 20 reps to improve LE strength with rest break given to minimize fatigued.  Fair technique with gait training with cues given to adjust right step length due to TTWB. Patient/Caregiver Education:   Pt education on safety and fall prevention was provided to ensure safety during transfers. ASSESSMENT:  Patient continues to benefit from Skilled PT services to improve LE strength and mobility  Progression toward goals:  ?      Improving appropriately and progressing toward goals  ? Improving slowly and progressing toward goals  ? Not making progress toward goals and plan of care will be adjusted     Treatment session: 45 minutes.   Therapist:   Kolby Jade PTA,          5/5/2019

## 2019-05-05 NOTE — ROUTINE PROCESS
Bedside and Verbal shift change report given to CASE Culver RN(oncoming nurse) by Solange Pitts RN (offgoing nurse). Report included the following information SBAR, Kardex and Med Rec Status.  Qh visual and 24h chart checks done

## 2019-05-05 NOTE — PROGRESS NOTES
Percocet 5-325 mg 1 tab po for complaints of right hip pain. Voided per urinal, ate 50% of both meals.

## 2019-05-05 NOTE — PROGRESS NOTES
Problem: Self Care Deficits Care Plan (Adult)  Goal: *Acute Goals and Plan of Care (Insert Text)  Description  OCCUPATIONAL THERAPY SHORT TERM GOALS    Initiated 4/30/2019 and to be accomplished within 2 Week(s)    1. Patient will perform Upper body ADL's without adaptive equipment with modified independence. 2.  Patient will perform Lower body ADL's with adaptive equipment with moderate assistance. 3.  Patient will perform toileting task with moderate assistance  with Good safety to reduce falls risk. 4.  Patient will perform bedside commode transfers with Hortensia Mangle and minimal assistance while adhering to RLE TTWB. 5.  Patient will perform standing static/dynamic balance activities for improved ADL function with minimal assistance and Fair+ balance and safety awareness while adhering to RLE TTWB. 6.  Patient will improve Barthel index scores to atleast [id number] to improve functional mobility. 7.  Patient will utilize energy conservation techniques during functional activities with minimal verbal cues. OCCUPATIONAL THERAPY LONG TERM GOALS   Initiated 4/30/2019 and to be accomplished within 4 Week(s)    1. Patient will perform ADL's with adaptive equipment as needed with modified independence. 2.  Patient will perform toileting task with modified independence with Good safety to reduce falls risk. 3.  Patient will perform all functional transfers utilizing least restrictive device and durable medical equipment as needed with modified independence and Good balance and safety awareness. 4.  Patient will perform standing static/dynamic activity for improved ADL function with modified independence and Good balance and safety awareness. 5.  Patient will improve Barthel index score to 95/100 to improve independence with mobility. 6. Patient will perform UE HEP with Modified [place] to increase BUE strength for continued participation in ADLs.     Therapist: Shasta Cabello    4/30/2019 Outcome: Progressing Towards Goal  [facility]   OCCUPATIONAL THERAPY DAILY TREATMENT NOTE      Patient: Clover Kelly (18 y.o. male)       Date: 5/5/2019  Attending Physician: Suzy Mayo MD  Primary Diagnosis: rt hip fracture    Treatment Diagnosis  Treatment Diagnosis: need for assist with self care  Treatment Diagnosis 2: muscle weakness   Precautions : Precautions at Admission: Fall, TTWB(TTWB RLE)  Vital Signs:  Vital Signs  Cardiac Rhythm: Atrial fibrillation     Cognitive Status:  Mental Status  Neurologic State: Alert;Confused  Orientation Level: Oriented to person;Oriented to place  Cognition: Decreased attention/concentration  Pain:  Pain Screen  Pain Scale 1: Numeric (0 - 10)  Pain Intensity 1: 0  Pain Onset 1: Movement(Reports no pain a rest)  Pain Location 1: Hip  Pain Orientation 1: Right  Pain Description 1: Aching  Pain Intervention(s) 1: Medication (see MAR); Emotional support;Distraction;Food;Environmental changes;Repositioned  Patient Stated Pain Goal: 0  Pain Reassessment 1: Patient sleeping  Pain Scale 1: Numeric (0 - 10)  Gross Assessment:     Coordination:     Bed Mobility:     Transfers:  Functional Transfers  Sit to Stand: Minimum assistance  Stand to Sit: Minimum assistance  Bed to Chair: Minimum assistance     Balance:  Balance  Sitting - Static: Normal   Standing - Static: Fair  ADL Self Care:     ADL Intervention:                          Therapeutic Activities: Patient provided instruction for reacher for item retrieval from floor surface to prevent falls, pt participated in practice with good accuracy with return demonstration, therapist issued reacher to increase independence with item retrieval while prevention falls.     Therapeutic Exercises:UB restorator x 15 minutes w/ one rest breaks in order to increase UB strength, cardiopulmonary capacity and functional activity tolerance to improve ADL performance skills and functional mobility, patient tolerated well,     Patient/Caregiver Education:     Pt. Instructed on safety awareness with importance to utilize call bell to request assist with functional transfers to prevent falls, patient verbalized understanding and provided accurate demonstration. ASSESSMENT:  Patient continues to demonstrate the need for skilled Occupational Therapy services to improve grooming, bathing, upper body dressing, lower body dressing, toileting and toilet transfer  Progression toward goals:  ?      Improving appropriately and progressing toward goals  ? Improving slowly and progressing toward goals  ?       Not making progress toward goals and plan of care will be adjusted     Treatment session:   38 minutes    Therapist:    Bernard Fregoso,  5/5/2019

## 2019-05-06 LAB
ANION GAP SERPL CALC-SCNC: 6 MMOL/L (ref 3–18)
BASOPHILS # BLD: 0.1 K/UL (ref 0–0.1)
BASOPHILS NFR BLD: 1 % (ref 0–2)
BUN SERPL-MCNC: 18 MG/DL (ref 7–18)
BUN/CREAT SERPL: 20 (ref 12–20)
CALCIUM SERPL-MCNC: 8.3 MG/DL (ref 8.5–10.1)
CHLORIDE SERPL-SCNC: 107 MMOL/L (ref 100–108)
CO2 SERPL-SCNC: 25 MMOL/L (ref 21–32)
CREAT SERPL-MCNC: 0.91 MG/DL (ref 0.6–1.3)
DIFFERENTIAL METHOD BLD: ABNORMAL
EOSINOPHIL # BLD: 0.4 K/UL (ref 0–0.4)
EOSINOPHIL NFR BLD: 6 % (ref 0–5)
ERYTHROCYTE [DISTWIDTH] IN BLOOD BY AUTOMATED COUNT: 14.6 % (ref 11.6–14.5)
GLUCOSE BLD STRIP.AUTO-MCNC: 126 MG/DL (ref 70–110)
GLUCOSE BLD STRIP.AUTO-MCNC: 126 MG/DL (ref 70–110)
GLUCOSE BLD STRIP.AUTO-MCNC: 166 MG/DL (ref 70–110)
GLUCOSE BLD STRIP.AUTO-MCNC: 198 MG/DL (ref 70–110)
GLUCOSE SERPL-MCNC: 116 MG/DL (ref 74–99)
HCT VFR BLD AUTO: 32.4 % (ref 36–48)
HGB BLD-MCNC: 10.5 G/DL (ref 13–16)
INR PPP: 2.8 (ref 0.8–1.2)
LYMPHOCYTES # BLD: 1.3 K/UL (ref 0.9–3.6)
LYMPHOCYTES NFR BLD: 21 % (ref 21–52)
MCH RBC QN AUTO: 27.8 PG (ref 24–34)
MCHC RBC AUTO-ENTMCNC: 32.4 G/DL (ref 31–37)
MCV RBC AUTO: 85.7 FL (ref 74–97)
MONOCYTES # BLD: 0.8 K/UL (ref 0.05–1.2)
MONOCYTES NFR BLD: 14 % (ref 3–10)
NEUTS SEG # BLD: 3.6 K/UL (ref 1.8–8)
NEUTS SEG NFR BLD: 58 % (ref 40–73)
PLATELET # BLD AUTO: 346 K/UL (ref 135–420)
PMV BLD AUTO: 9.5 FL (ref 9.2–11.8)
POTASSIUM SERPL-SCNC: 5.1 MMOL/L (ref 3.5–5.5)
PROTHROMBIN TIME: 29.6 SEC (ref 11.5–15.2)
RBC # BLD AUTO: 3.78 M/UL (ref 4.7–5.5)
SODIUM SERPL-SCNC: 138 MMOL/L (ref 136–145)
WBC # BLD AUTO: 6.2 K/UL (ref 4.6–13.2)

## 2019-05-06 PROCEDURE — 36415 COLL VENOUS BLD VENIPUNCTURE: CPT

## 2019-05-06 PROCEDURE — 80048 BASIC METABOLIC PNL TOTAL CA: CPT

## 2019-05-06 PROCEDURE — 85025 COMPLETE CBC W/AUTO DIFF WBC: CPT

## 2019-05-06 PROCEDURE — 85610 PROTHROMBIN TIME: CPT

## 2019-05-06 PROCEDURE — 74011636637 HC RX REV CODE- 636/637: Performed by: INTERNAL MEDICINE

## 2019-05-06 PROCEDURE — 82962 GLUCOSE BLOOD TEST: CPT

## 2019-05-06 PROCEDURE — 74011250637 HC RX REV CODE- 250/637: Performed by: INTERNAL MEDICINE

## 2019-05-06 PROCEDURE — 74011250637 HC RX REV CODE- 250/637: Performed by: NURSE PRACTITIONER

## 2019-05-06 RX ADMIN — DOCUSATE SODIUM 100 MG: 100 CAPSULE, LIQUID FILLED ORAL at 08:12

## 2019-05-06 RX ADMIN — INSULIN LISPRO 2 UNITS: 100 INJECTION, SOLUTION INTRAVENOUS; SUBCUTANEOUS at 12:41

## 2019-05-06 RX ADMIN — LISINOPRIL 10 MG: 10 TABLET ORAL at 08:12

## 2019-05-06 RX ADMIN — CHOLECALCIFEROL (VITAMIN D3) 25 MCG (1,000 UNIT) TABLET 1000 UNITS: at 08:12

## 2019-05-06 RX ADMIN — POLYETHYLENE GLYCOL 3350 17 G: 17 POWDER, FOR SOLUTION ORAL at 08:12

## 2019-05-06 RX ADMIN — METFORMIN HYDROCHLORIDE 1000 MG: 500 TABLET ORAL at 08:12

## 2019-05-06 RX ADMIN — SIMVASTATIN 20 MG: 20 TABLET, FILM COATED ORAL at 21:27

## 2019-05-06 RX ADMIN — OXYCODONE HYDROCHLORIDE AND ACETAMINOPHEN 1 TABLET: 5; 325 TABLET ORAL at 11:25

## 2019-05-06 RX ADMIN — OXYCODONE HYDROCHLORIDE AND ACETAMINOPHEN 2 TABLET: 10; 325 TABLET ORAL at 21:27

## 2019-05-06 RX ADMIN — WARFARIN SODIUM 2 MG: 2 TABLET ORAL at 17:00

## 2019-05-06 RX ADMIN — INSULIN LISPRO 2 UNITS: 100 INJECTION, SOLUTION INTRAVENOUS; SUBCUTANEOUS at 21:31

## 2019-05-06 RX ADMIN — METFORMIN HYDROCHLORIDE 1000 MG: 500 TABLET ORAL at 16:57

## 2019-05-06 RX ADMIN — PANTOPRAZOLE SODIUM 40 MG: 40 TABLET, DELAYED RELEASE ORAL at 08:12

## 2019-05-06 RX ADMIN — OXYCODONE HYDROCHLORIDE AND ACETAMINOPHEN 1 TABLET: 5; 325 TABLET ORAL at 05:05

## 2019-05-06 NOTE — PROGRESS NOTES
I have reviewed this patient's current medication list and recent laboratory results. At this time, I do not suggest any drug therapy adjustments or additional laboratory monitoring. Thank you,  Earl GUADARRAMA  Ph. M. S.  5/6/2019

## 2019-05-06 NOTE — ROUTINE PROCESS
Bedside and Verbal shift change report given to Rosie rn (oncoming nurse) by yumiko rn (offgoing nurse). Report included the following information Kardex, MAR and Recent Results.

## 2019-05-06 NOTE — PROGRESS NOTES
[facility]    Daily progress Note    Patient: Lazarus Hansen MRN: [id number]  CSN: [id number]    YOB: 1931  Age: 80 y.o. Sex: male    DOA: 4/29/2019 LOS:  LOS: 7 days                    Subjective:     CC: follow up PT/INR results    Ms. Iza Wren is a 80 yr old female who was recently hospitalized 4/26-4/29. Patient came in ER post mechanical fall at home. XRAY showed  Impacted subcapital right femoral neck fracture with moderately severe right hip joint osteoarthritis. Ortho saw patient and had right hip percutaneous pinning on 4/26 and tolerated. Patient had episode of bradycardia which resolved. Patient coumadin restarted with PT/INR monitoring. Patient improved and was sent to [facility] for rehab. Since admitted he had issues constipation, he was started on bowel regiment which is effective, last bm on 5/5. He denies any pain at this time, only occasional soreness to right groin. Staples to right hip is intact, clean, no drainage. VSS stable, I reviewed labs, wnl.   He is on Coumadin for AFIB, inr this Am is 2.8, will continue Coumadin 2 mg.        PAST MEDICAL HX: Alzheimer's Disease, HTN AFIB, DM type 2      PAST SURGICAL Hx: hernia repair     SOCIAL Hx:      ALLERGIES: NKDA     ROSCONST: Fever, weight loss, fatigue or chills  HEENT: Recent changes in vision, vertigo  CV: Chest pain, palpitations, edema and varicosities  RESP: Cough, shortness of breath, wheezing  GI: Nausea, vomiting, abdominal pain, change in bowel habits,  : Hematuria, dysuria, frequency  MS: Weakness, right hip sorness  LYMPH/HEME: Anemia, bruising and history of blood transfusions  INTEG: Dermatitis, abnormal moles  NEURO: Dizziness, headache, fainting, seizures and stroke  PSYCH: Anxiety and depression           Objective:      Visit Vitals  /71   Pulse 60   Temp 97.2 °F (36.2 °C)   Resp 16   Ht 5' 9\" (1.753 m)   Wt 64.4 kg (142 lb)   SpO2 94%   BMI 20.97 kg/m²       Physical Exam:  General appearance: alert, cooperative, no distress, appears stated age  [de-identified]: negative  Neck: supple, symmetrical, trachea midline, no adenopathy, thyroid: not enlarged, symmetric, no tenderness/mass/nodules, no carotid bruit and no JVD  Lungs: clear to auscultation bilaterally  Heart: regular rate and rhythm, S1, S2 normal, no murmur, click, rub or gallop  Abdomen: soft, non-tender. Bowel sounds normal. No masses,  no organomegaly  Pulses: 2+ and symmetric  Skin: Skin color, texture, turgor normal. No rashes or lesions  Staples to right hip     Intake and Output:  Current Shift:  No intake/output data recorded. Last three shifts:  No intake/output data recorded. Recent Results (from the past 24 hour(s))   GLUCOSE, POC    Collection Time: 05/05/19 11:35 AM   Result Value Ref Range    Glucose (POC) 185 (H) 70 - 110 mg/dL   GLUCOSE, POC    Collection Time: 05/05/19  4:13 PM   Result Value Ref Range    Glucose (POC) 133 (H) 70 - 110 mg/dL   GLUCOSE, POC    Collection Time: 05/05/19  9:06 PM   Result Value Ref Range    Glucose (POC) 156 (H) 70 - 110 mg/dL   GLUCOSE, POC    Collection Time: 05/06/19  5:01 AM   Result Value Ref Range    Glucose (POC) 126 (H) 70 - 110 mg/dL   CBC WITH AUTOMATED DIFF    Collection Time: 05/06/19  5:05 AM   Result Value Ref Range    WBC 6.2 4.6 - 13.2 K/uL    RBC 3.78 (L) 4.70 - 5.50 M/uL    HGB 10.5 (L) 13.0 - 16.0 g/dL    HCT 32.4 (L) 36.0 - 48.0 %    MCV 85.7 74.0 - 97.0 FL    MCH 27.8 24.0 - 34.0 PG    MCHC 32.4 31.0 - 37.0 g/dL    RDW 14.6 (H) 11.6 - 14.5 %    PLATELET 243 462 - 569 K/uL    MPV 9.5 9.2 - 11.8 FL    NEUTROPHILS 58 40 - 73 %    LYMPHOCYTES 21 21 - 52 %    MONOCYTES 14 (H) 3 - 10 %    EOSINOPHILS 6 (H) 0 - 5 %    BASOPHILS 1 0 - 2 %    ABS. NEUTROPHILS 3.6 1.8 - 8.0 K/UL    ABS. LYMPHOCYTES 1.3 0.9 - 3.6 K/UL    ABS. MONOCYTES 0.8 0.05 - 1.2 K/UL    ABS. EOSINOPHILS 0.4 0.0 - 0.4 K/UL    ABS.  BASOPHILS 0.1 0.0 - 0.1 K/UL    DF AUTOMATED     PROTHROMBIN TIME + INR Collection Time: 05/06/19  5:05 AM   Result Value Ref Range    Prothrombin time 29.6 (H) 11.5 - 15.2 sec    INR 2.8 (H) 0.8 - 1.2     METABOLIC PANEL, BASIC    Collection Time: 05/06/19  5:05 AM   Result Value Ref Range    Sodium 138 136 - 145 mmol/L    Potassium 5.1 3.5 - 5.5 mmol/L    Chloride 107 100 - 108 mmol/L    CO2 25 21 - 32 mmol/L    Anion gap 6 3.0 - 18 mmol/L    Glucose 116 (H) 74 - 99 mg/dL    BUN 18 7.0 - 18 MG/DL    Creatinine 0.91 0.6 - 1.3 MG/DL    BUN/Creatinine ratio 20 12 - 20      GFR est AA >60 >60 ml/min/1.73m2    GFR est non-AA >60 >60 ml/min/1.73m2    Calcium 8.3 (L) 8.5 - 10.1 MG/DL         Current Facility-Administered Medications:     docusate sodium (COLACE) capsule 100 mg, 100 mg, Oral, DAILY, Nelly Hansen NP, 100 mg at 05/06/19 4130    polyethylene glycol (MIRALAX) packet 17 g, 17 g, Oral, DAILY, Brandyn BOLDEN NP, 17 g at 05/06/19 0590    [facility] ANESTHESIA, , Other, PRN, Aimee Garibay MD    [facility] EMERGENCY/STAT BOX, , Other, PRN, Aimee Garibay MD    oxyCODONE-acetaminophen (PERCOCET) 5-325 mg per tablet 1 Tab, 1 Tab, Oral, Q4H PRN, Aimee Garibay MD, 1 Tab at 05/06/19 0505    oxyCODONE-acetaminophen (PERCOCET 10)  mg per tablet 2 Tab, 2 Tab, Oral, Q4H PRN, Aimee Garibay MD, 2 Tab at 05/05/19 2107    warfarin (COUMADIN) tablet 2 mg, 2 mg, Oral, QPM, Aimee Garibay MD, 2 mg at 05/05/19 1718    cholecalciferol (VITAMIN D3) tablet 1,000 Units, 1,000 Units, Oral, DAILY, Latosha Xiao MD, 1,000 Units at 05/06/19 0812    lisinopril (PRINIVIL, ZESTRIL) tablet 10 mg, 10 mg, Oral, DAILY, Latosha Xiao MD, 10 mg at 05/06/19 7683    metFORMIN (GLUCOPHAGE) tablet 1,000 mg, 1,000 mg, Oral, BID WITH MEALS, Latosha Xiao MD, 1,000 mg at 05/06/19 0812    pantoprazole (PROTONIX) tablet 40 mg, 40 mg, Oral, ACB, Latosha Xiao MD, 40 mg at 05/06/19 0812    insulin lispro (HUMALOG) injection, , SubCUTAneous, AC&HS, Latosha Xiao, MD, Stopped at 05/06/19 0730    flaxseed oil cap 1,000 mg (Patient Supplied), 1,000 mg, Oral, DAILY, Diane Gonzales MD, 1,000 mg at 04/30/19 0900    WARFARIN INFORMATION NOTE (COUMADIN), , Other, PRN, Diane Gonzales MD    simvastatin (ZOCOR) tablet 20 mg, 20 mg, Oral, QHS, Diane Gonzales MD, 20 mg at 05/05/19 2107    magnesium hydroxide (MILK OF MAGNESIA) 400 mg/5 mL oral suspension 30 mL, 30 mL, Oral, DAILY PRN, Diane Gonzales MD, 30 mL at 04/29/19 2300    bisacodyl (DULCOLAX) suppository 10 mg, 10 mg, Rectal, DAILY PRN, Diane Gonzales MD    Lab Results   Component Value Date/Time    Glucose 116 (H) 05/06/2019 05:05 AM    Glucose 212 (H) 04/29/2019 06:55 PM    Glucose 181 (H) 04/27/2019 04:10 AM    Glucose 95 04/26/2019 05:44 AM        Assessment/Plan   Closed right hip fracture: s/p right hip percutaneous pinning on 4/26. Staples out in 14 days. Orthopedic surgery in 1 month     Constipated: continue  colace + Miralax daily, and prn dulcolax suppository      AFIB: continue Coumadin with PT/INR monitoring  Monday and Thursday    DM type 2 , hgab1c 6.8 on 4/26, will continue metformin 1000 mg bid + Amaryl 4 mg, will monitor for hypoglycemia episodes. Renal function stable. May hold Amaryl if hypoglycemic episodes      HTN; BP stable on Lisinopril, will monitor K while on med.  K in AM      Hyperlipidemia: continue Zocor      Continue PT/OT     FLU: he report that he received FLU shot this year, not sure about nitesh Sabillon NP  5/6/2019, 9:34 AM

## 2019-05-06 NOTE — ROUTINE PROCESS
Bedside and Verbal shift change report given to JOAQUIN CarcamoRN (oncoming nurse) by Mary De Los Santos RN (offgoing nurse). Report included the following information SBAR, Kardex and Recent Results. Qh visual checks and 24h chart checks done.

## 2019-05-06 NOTE — PROGRESS NOTES
Problem: Self Care Deficits Care Plan (Adult)  Goal: *Acute Goals and Plan of Care (Insert Text)  Description  -CLOF: OCCUPATIONAL THERAPY SHORT TERM GOALS      Updated 5/6/19    1. Patient will perform Upper body ADL's without adaptive equipment with modified independence. 2.  Patient will perform Lower body ADL's with adaptive equipment with moderate assistance. 3.  Patient will perform toileting task with moderate assistance  with Good safety to reduce falls risk. 4.  Patient will perform bedside commode transfers with Bird Mize and minimal assistance while adhering to RLE TTWB. 5.  Patient will perform standing static/dynamic balance activities for improved ADL function with minimal assistance and Fair+ balance and safety awareness while adhering to RLE TTWB. 6.  Patient will improve Barthel index scores to atleast 70124 to improve functional mobility. 7.  Patient will utilize energy conservation techniques during functional activities with minimal verbal cues. Initiated 4/30/2019 and to be accomplished within 2 Week(s)    1. Patient will perform Upper body ADL's without adaptive equipment with modified independence. -CLOF: bathing and dressing w/ set up  2. Patient will perform Lower body ADL's with adaptive equipment with moderate assistance. -CLOF: bathing and dressing w/ Max A  3. Patient will perform toileting task with moderate assistance  with Good safety to reduce falls risk. -CLOF: Mod I w/ bladder mgmt using urinal. Dep bowel mgmt, Max A clothing mgmt  4. Patient will perform bedside commode transfers with Rolling Walker and minimal assistance while adhering to RLE TTWB.-CLOF: CGA w/ RW while adhering to RLE TTWB  5. Patient will perform standing static/dynamic balance activities for improved ADL function with minimal assistance and Fair+ balance and safety awareness while adhering to RLE TTWB.-CLOF: Karmen static and Fair dynamic w/ RW & CGA while adhering to RLE TTWB  6.   Patient will improve Barthel index scores to atleast [id number] to improve functional mobility. -CLOF: 55/100  7. Patient will utilize energy conservation techniques during functional activities with minimal verbal cues. -CLOF:  Max verbal cues    OCCUPATIONAL THERAPY LONG TERM GOALS   Initiated 4/30/2019 and to be accomplished within 4 Week(s)  1. Patient will perform ADL's with adaptive equipment as needed with modified independence. 2.  Patient will perform toileting task with modified independence with Good safety to reduce falls risk. 3.  Patient will perform all functional transfers utilizing least restrictive device and durable medical equipment as needed with modified independence and Good balance and safety awareness. 4.  Patient will perform standing static/dynamic activity for improved ADL function with modified independence and Good balance and safety awareness. 5.  Patient will improve Barthel index score to 95/100 to improve independence with mobility. 6. Patient will perform UE HEP with Modified [place] to increase BUE strength for continued participation in ADLs.     Therapist: Duyen Brown    4/30/2019              Outcome: Progressing Towards Goal  [facility]  OCCUPATIONAL THERAPY WEEKLY SUMMARY   Reporting period:  from 4/30/19 through 5/6/19       Patient: Mica Muhammad (89 y.o. male)   Date: 5/6/2019    Primary Diagnosis: rt hip fracture       Attending Physician: Zeny Vera MD Treatment Diagnosis  Treatment Diagnosis: need for assist with self care  Treatment Diagnosis 2: muscle weakness  Precautions: Fall, TTWB(TTWB RLE)  Rehab Potential : Lorence Net    Skill interventions and education provided with clinical rationale (include individualized treatment techniques and standardized tests):  Skilled Occupational services were provided utilizing ADL retraining, instruction on safety awareness and energy conservation techniques incorporating balance retraining & UE ROM techniques, therapeutic exercise and activities incorporating strengthening in order to improve ADL performance skills and functional mobility. Patient has made progress, continue OT skilled services in order for patient to reach maximal functional potential towards goals for safe d/c home. Using a comparative statement, summarize significant progress toward goals as a result of skilled intervention provided:  Patient has made Fair progress towards their Occupational Therapy goals in the following areas: grooming, bathing, upper body dressing, lower body dressing, toileting and toilet transfer using {RW, w/c, BSC and hospital bed w/ rails/  Identify remaining functional areas, impairments limiting progress and/or barriers to improvement:  Patient would benefit from continued skilled Occupational Therapy Services to address the following functional deficits in balance, coordination, safety awareness, strength and functional activity tolerance, which is inhibiting independence with ADL performance skills and functional mobility. Patient is inhibited by poor safety awareness and cognitive deficits, which prevent him from making good progress this week.         OBJECTIVE DATA SUMMARY:       INITIAL ASSESSMENT WEEKLY PROGRESS   COGNITIVE STATUS: COGNITIVE STATUS:   Neurologic State: Alert  Orientation Level: Oriented X4(forgetful at times)  Cognition: Follows commands  Perception: Appears intact  Perseveration: No perseveration noted  Safety/Judgement: Fall prevention Neurologic State: Alert  Orientation Level: Oriented to person, Oriented to place, Oriented to time  Cognition: Decreased attention/concentration  Perception: Appears intact  Perseveration: No perseveration noted  Safety/Judgement: Fall prevention   PAIN: PAIN:   Pain Scale 1: Numeric (0 - 10)  Pain Intensity 1: 4  Pain Onset 1: acute  Pain Location 1: Hip  Pain Orientation 1: Right  Pain Description 1: Aching  Pain Intervention(s) 1: Medication (see MAR)  Patient Stated Pain Goal: 0  Pain Reassessment 1: Yes     Pain Scale 1: Numeric (0 - 10)  Pain Intensity 1: 0  Pain Onset 1: now  Pain Location 1: Hip  Pain Orientation 1: Right  Pain Description 1: Aching  Pain Intervention(s) 1: Position, Medication (see MAR)  Patient Stated Pain Goal: 0  Pain Reassessment 1: Yes   BED MOBILITY BED MOBILITY   Supine to Sit: Moderate assistance(for RLE management)  Scooting: Minimum assistance Supine to Sit: Moderate assistance  Scooting: Minimum assistance   ADL SELF CARE ADL SELF CARE   Type of Bath: Basin/Soap/Water  Toileting: Minimum assistance(A with clothing mgmt) Bathing Assistance: Set-up    Dressing Assistance: Set-up    Bathing Assistance: Maximum assistance    Bladder Hygiene: Modified independent(with vc's to initiate, pt set up urinal lying supine in bed)  Bowel Hygiene: Total assistance (dependent)  Clothing Management: Maximum assistance(don and hike adult pull up brief lying supine, roll R <-> L)  Adaptive Equipment: Walker(BSC)    Grooming Assistance: Set-up    Dressing Assistance: Maximum assistance  Pants With Elastic Waist: Maximum assistance(doff lying supine in bed)    Feeding Assistance: Modified independent   TRANSFERS TRANSFERS   Sit to Stand: Moderate assistance  Stand to Sit: Moderate assistance  Bed to Chair: Minimum assistance  Toilet Transfer : Moderate assistance, Assist x1 Sit to Stand: Minimum assistance  Stand to Sit: Minimum assistance  Bed to Chair: Minimum assistance  Toilet Transfer : Contact guard assistance   Toilet Transfer :  Moderate assistance, Assist x1  Cues: Verbal cues provided(to maintain RLE TTWB)  Adaptive Equipment: Melisa Bullard (comment), Walker (comment) Toilet Transfer : Contact guard assistance  Cues: Verbal cues provided(to maintain RLE TTWB)  Adaptive Equipment: Cane (comment), Walker (comment)   BALANCE BALANCE   Sitting: With support  Sitting - Static: Fair (occasional)  Sitting - Dynamic: Fair (occasional)  Standing: With support, Impaired  Standing - Static: Fair  Standing - Dynamic : Fair(-) Sitting: With support  Sitting - Static: Normal   Sitting - Dynamic: Fair (occasional)  Standing: With support  Standing - Static: Fair  Standing - Dynamic : Fair       GROSS ASSESSMENT  GROSS ASSESSMENT   AROM: Generally decreased, functional  PROM: Generally decreased, functional  Strength: Generally decreased, functional(R hip 2-5, R knee 4+/5; L hip 4-/5, L knee 4+/5)  Coordination: Generally decreased, functional  Tone: Normal  Sensation: Intact(BUEs) AROM: Within functional limits(BUEs)  PROM: Generally decreased, functional  Strength: Generally decreased, functional(BUEs 4-/5)  Coordination: Within functional limits(BUEs)  Tone: Normal(BUEs)  Sensation: Intact(BUEs)   COORDINATION COORDINATION   Fine Motor Skills-Upper: Left Intact, Right Intact  Gross Motor Skills-Upper: Left Intact, Right Intact Fine Motor Skills-Upper: Left Intact, Right Intact  Gross Motor Skills-Upper: Left Intact, Right Intact   VISUAL/PERCEPTUAL VISUAL/PERCEPTUAL   Corrective Lenses: Reading glasses   Corrective Lenses: Reading glasses     AUDITORY: AUDITORY:   Auditory Impairment: Hard of hearing, bilateral Auditory Impairment: Hard of hearing, left side       INSTRUMENTAL  ADL'S:    INSTRUMENTAL ADL'S:          THE BARTHEL INDEX  ACTIVITY   SCORE   FEEDING  0=unable  5=needs help cutting,spreading butter,etc., or modified diet  10= independent   10   BATHING  0=dependent  5=independent (or in shower   0   GROOMING  0=needs help  5=independent face/hair/teeth/shaving (implements provided)   5   DRESSING  0=dependent  5=needs help but can do about half unaided  10=independent(including buttons, zips,laces etc.)   5   BOWELS  0=incontinent  5=occasional accident  10=continent   10   BLADDER  0=incontinent, or catheterized and unable to manage alone  5=occasional accident  10=continent   10   TOILET USE  0=dependent  5=needs some help, but can do something alone  10=independent (on and off, dressing, wiping)   5   TRANSFER (BED TO CHAIR AND BACK)  0=unable, no sitting balance  5=major help(one or two people,physical), can sit  10=minor help(verbal or physical)  15=independent   10   MOBILITY (ON LEVEL SURFACES)  0=immobile or <50 yards  5=wheelchair independent,including corners,>50 yards  10=walkes with help of one person (verbal or physical) >50 yards  15=independent(but may use any aid; for example, stick) >50 yards   0   STAIRS  0=unable  5=needs help (verbal, physical, carrying aid)  10=independent   0            TOTAL:                  55/100     Treatment: N/A    Patient's response to Treatment rendered:  N/A    Patient expected Discharge Location:  ? Private Residence  ? skilled nursing/ILF  ? LTC  ? Other:    Plan: Continue OT services as established on the Plan of Care for 3-6 times a week. Treatment Minutes:  0  OT and Assistant have had a weekly case conference regarding the above treatment:  ? Yes     ?  No    Therapist:   Barrett Jarrett,         Date:5/6/2019     Forward to OT for co-signature when completed

## 2019-05-07 LAB
GLUCOSE BLD STRIP.AUTO-MCNC: 109 MG/DL (ref 70–110)
GLUCOSE BLD STRIP.AUTO-MCNC: 119 MG/DL (ref 70–110)
GLUCOSE BLD STRIP.AUTO-MCNC: 142 MG/DL (ref 70–110)
GLUCOSE BLD STRIP.AUTO-MCNC: 248 MG/DL (ref 70–110)
GLUCOSE BLD STRIP.AUTO-MCNC: 266 MG/DL (ref 70–110)

## 2019-05-07 PROCEDURE — 82962 GLUCOSE BLOOD TEST: CPT

## 2019-05-07 PROCEDURE — 74011250637 HC RX REV CODE- 250/637: Performed by: NURSE PRACTITIONER

## 2019-05-07 PROCEDURE — 74011250637 HC RX REV CODE- 250/637: Performed by: INTERNAL MEDICINE

## 2019-05-07 PROCEDURE — 74011636637 HC RX REV CODE- 636/637: Performed by: INTERNAL MEDICINE

## 2019-05-07 RX ADMIN — OXYCODONE HYDROCHLORIDE AND ACETAMINOPHEN 2 TABLET: 10; 325 TABLET ORAL at 19:43

## 2019-05-07 RX ADMIN — PANTOPRAZOLE SODIUM 40 MG: 40 TABLET, DELAYED RELEASE ORAL at 09:27

## 2019-05-07 RX ADMIN — DOCUSATE SODIUM 100 MG: 100 CAPSULE, LIQUID FILLED ORAL at 09:27

## 2019-05-07 RX ADMIN — INSULIN LISPRO 4 UNITS: 100 INJECTION, SOLUTION INTRAVENOUS; SUBCUTANEOUS at 12:09

## 2019-05-07 RX ADMIN — SIMVASTATIN 20 MG: 20 TABLET, FILM COATED ORAL at 21:25

## 2019-05-07 RX ADMIN — OXYCODONE HYDROCHLORIDE AND ACETAMINOPHEN 1 TABLET: 5; 325 TABLET ORAL at 14:47

## 2019-05-07 RX ADMIN — CHOLECALCIFEROL (VITAMIN D3) 25 MCG (1,000 UNIT) TABLET 1000 UNITS: at 09:27

## 2019-05-07 RX ADMIN — POLYETHYLENE GLYCOL 3350 17 G: 17 POWDER, FOR SOLUTION ORAL at 09:27

## 2019-05-07 RX ADMIN — OXYCODONE HYDROCHLORIDE AND ACETAMINOPHEN 1 TABLET: 5; 325 TABLET ORAL at 10:44

## 2019-05-07 RX ADMIN — METFORMIN HYDROCHLORIDE 1000 MG: 500 TABLET ORAL at 18:01

## 2019-05-07 RX ADMIN — OXYCODONE HYDROCHLORIDE AND ACETAMINOPHEN 1 TABLET: 5; 325 TABLET ORAL at 00:25

## 2019-05-07 RX ADMIN — LISINOPRIL 10 MG: 10 TABLET ORAL at 09:27

## 2019-05-07 RX ADMIN — METFORMIN HYDROCHLORIDE 1000 MG: 500 TABLET ORAL at 09:27

## 2019-05-07 RX ADMIN — WARFARIN SODIUM 2 MG: 2 TABLET ORAL at 18:01

## 2019-05-07 NOTE — PROGRESS NOTES
Problem: Self Care Deficits Care Plan (Adult)  Goal: *Acute Goals and Plan of Care (Insert Text)  Description  -CLOF: OCCUPATIONAL THERAPY SHORT TERM GOALS      Updated 5/6/19    1. Patient will perform Upper body ADL's without adaptive equipment with modified independence. 2.  Patient will perform Lower body ADL's with adaptive equipment with moderate assistance. 3.  Patient will perform toileting task with moderate assistance  with Good safety to reduce falls risk. 4.  Patient will perform bedside commode transfers with Harvie Purl and minimal assistance while adhering to RLE TTWB. 5.  Patient will perform standing static/dynamic balance activities for improved ADL function with minimal assistance and Fair+ balance and safety awareness while adhering to RLE TTWB. 6.  Patient will improve Barthel index scores to atleast 40149 to improve functional mobility. 7.  Patient will utilize energy conservation techniques during functional activities with minimal verbal cues. Initiated 4/30/2019 and to be accomplished within 2 Week(s)    1. Patient will perform Upper body ADL's without adaptive equipment with modified independence. -CLOF: bathing and dressing w/ set up  2. Patient will perform Lower body ADL's with adaptive equipment with moderate assistance. -CLOF: bathing and dressing w/ Max A  3. Patient will perform toileting task with moderate assistance  with Good safety to reduce falls risk. -CLOF: Mod I w/ bladder mgmt using urinal. Dep bowel mgmt, Max A clothing mgmt  4. Patient will perform bedside commode transfers with Rolling Walker and minimal assistance while adhering to RLE TTWB.-CLOF: CGA w/ RW while adhering to RLE TTWB  5. Patient will perform standing static/dynamic balance activities for improved ADL function with minimal assistance and Fair+ balance and safety awareness while adhering to RLE TTWB.-CLOF: Karmen static and Fair dynamic w/ RW & CGA while adhering to RLE TTWB  6.   Patient will improve Barthel index scores to atleast 23497 to improve functional mobility. -CLOF: 55/100  7. Patient will utilize energy conservation techniques during functional activities with minimal verbal cues. -CLOF:  Max verbal cues    OCCUPATIONAL THERAPY LONG TERM GOALS   Initiated 4/30/2019 and to be accomplished within 4 Week(s)    1. Patient will perform ADL's with adaptive equipment as needed with modified independence. 2.  Patient will perform toileting task with modified independence with Good safety to reduce falls risk. 3.  Patient will perform all functional transfers utilizing least restrictive device and durable medical equipment as needed with modified independence and Good balance and safety awareness. 4.  Patient will perform standing static/dynamic activity for improved ADL function with modified independence and Good balance and safety awareness. 5.  Patient will improve Barthel index score to 95/100 to improve independence with mobility. 6. Patient will perform UE HEP with Modified [place] to increase BUE strength for continued participation in ADLs.     Therapist: Gabriela Stokes    4/30/2019               Outcome: Progressing Towards Goal   [facility]   OCCUPATIONAL THERAPY DAILY TREATMENT NOTE      Patient: Leena Tong (25 y.o. male)       Date: 5/7/2019  Attending Physician: Mak Quick MD  Primary Diagnosis: rt hip fracture    Treatment Diagnosis  Treatment Diagnosis: need for assist with self care  Treatment Diagnosis 2: muscle weakness   Precautions : Precautions at Admission: Fall, TTWB(TTWB RLE)  Vital Signs:  Vital Signs  Temp: 97.6 °F (36.4 °C)  Temp Source: Oral  Pulse (Heart Rate): 85  Resp Rate: 18  O2 Sat (%): 95 %  BP: 126/56  MAP (Calculated): 79     Cognitive Status:  Mental Status  Neurologic State: Alert  Orientation Level: Oriented to person;Oriented to place;Oriented to situation  Pain:        Gross Assessment:     Coordination: Bed Mobility:  Bed Mobility  Supine to Sit: Moderate assistance  Scooting: Contact guard assistance  Transfers:  Functional Transfers  Bed to Chair: Minimum assistance(w/ RW)     Balance:     ADL Self Care:     ADL Intervention:  Upper Body Bathing  Bathing Assistance: Set-up; Supervision  Position Performed: Seated edge of bed  Upper Body Dressing Assistance  Pullover Shirt: Set-up; Modified independent  Lower Body Bathing  Perineal  : Moderate assistance(ant periarea seated eob,post periarea dep in stance w/ RW)  Position Performed: Other(ant periarea seated eob,post periarea dep in stance w/ RW)  Cues: Verbal cues provided(adhere o LLE TWB)  Lower Body : Supervision;Set-up(upper thighs)  Position Performed: Seated edge of bed     Grooming  Washing Face: Set-up; Supervision  Brushing Teeth: Supervision;Set-up(seated w/c level at bedside table)  Brushing/Combing Hair: Modified independent  Lower Body Dressing Assistance  Protective Undergarmet: Maximum assistance; Total assistance (dependent)(Max A thread seated eob, dep hike in stance w/ RW)  Pants With Elastic Waist: Total assistance(dependent); Maximum assistance(Max A thread seated eob, dep hike in stance w/ RW)  Socks: Maximum assistance(doff & don)             Therapeutic Activities:  Bed mobility: Mod A supine to sit edge of bed w/ verbal cues for correct hand placement to assist using bed rail, scooting w/ CGA. Functional transfer:   Functional stand pivot (LLE \"heel/toe\") to w/c with Min A using RW with skilled instruction on pacing & positioning to ensure safety w/ good adherence to RLE TTWB. Nursing notified of pt request for pain meds, pt c/o 5/10 pain RLE at rest, 9/10 pain RLE with movement.    Therapeutic Exercises: Second tx session: UB restorator x 10 minutes w/ 1 rest break in order to increase UB strength, cardiopulmonary capacity and functional activity tolerance to improve ADL performance skills and functional mobility, patient tolerated well, Patient/Caregiver Education:  Pt. Instructed on safety awareness with importance to utilize call bell to request assist with functional transfers to prevent falls, patient verbalized understanding following repetition to enhance comprehension and pt provided accurate demonstration for use of call bell. ASSESSMENT:  Patient continues to demonstrate the need for skilled Occupational Therapy services to improve grooming, bathing, upper body dressing, lower body dressing, toileting and toilet transfer  Progression toward goals:  ?      Improving appropriately and progressing toward goals  ? Improving slowly and progressing toward goals  ?       Not making progress toward goals and plan of care will be adjusted     Treatment session: 65 minutes    Therapist:    Tarsha Ayala,  5/7/2019

## 2019-05-07 NOTE — PROGRESS NOTES
Nutrition follow-up/  Plan of care TCC      RECOMMENDATIONS:     1. Diabetic Consistent Carb diet  2. Monitor weight, labs and PO intake  3. RD to follow     GOALS:     1. Ongoing: PO intake meets >75% of protein/calorie needs by 5/14  2. Ongoing: Weight Maintenance (+/- 1-2 lb) by 5/14    ASSESSMENT:     Weight status is classified as normal per Body mass index is 20.11 kg/m². PO intake is fair. Labs noted. BG range (119-198) over the past 24 hours; A1c (6.8). Nutrition recommendations listed. RD to follow. Nutrition Risk:  [] High  [] Moderate [x]  Low    SUBJECTIVE/OBJECTIVE:   (5/7): Current weight on record showing a 6 lb or 4.2% loss since admission x 1 week ago. Noted stable weight 140-145 lb PTA. Last BM (5/5) and on bowel regimen. Pt seen in room later in the day. Stated his appetite/PO intake has been normal, but he has never been a big eater. Per RN Pt has been consuming ~50% of meals. Encouraged intake and will continue to follow. (4/30): Transferred from [address] on 4/29. Admitted s/p fall with right hip fracture. S/P right hip percutaneous pinning on 4/26. Has history of Alzheimer's disease, diabetes, HTN and a-fib. Patient reports having a good appetite and weight was stable at 145-150 lb PTA. Observed 75% intake of breakfast meal during visit. Denies food allergies and problems with chewing/swallowing. Will monitor.      Information Obtained from:    [x] Chart Review   [x] Patient   [] Family/Caregiver   [x] Nurse/Physician   [] Interdisciplinary Meeting/Rounds    Diet: Diabetic Consistent Carb diet  Medications: [x] Reviewed    Allergies: [x] Reviewed   Patient Active Problem List   Diagnosis Code    Hip fracture ([facility] S72.009A    Closed right hip fracture, initial encounter ([facility] S72.001A    Atrial fibrillation ([facility] [facility] I48.91    HTN (hypertension) I10     Past Medical History:   Diagnosis Date    Alzheimer's disease     Atrial fibrillation ([facility] [facility]     Diabetes ([address]     Hypertension       Labs:    Lab Results   Component Value Date/Time    Sodium 138 05/06/2019 05:05 AM    Potassium 5.1 05/06/2019 05:05 AM    Chloride 107 05/06/2019 05:05 AM    CO2 25 05/06/2019 05:05 AM    Anion gap 6 05/06/2019 05:05 AM    Glucose 116 (H) 05/06/2019 05:05 AM    BUN 18 05/06/2019 05:05 AM    Creatinine 0.91 05/06/2019 05:05 AM    Calcium 8.3 (L) 05/06/2019 05:05 AM     Anthropometrics: BMI (calculated): 20.1  Last 3 Recorded Weights in this Encounter    04/29/19 1920 05/06/19 1103   Weight: 64.4 kg (142 lb) 61.8 kg (136 lb 3.2 oz)      Ht Readings from Last 1 Encounters:   04/29/19 5' 9\" (1.753 m)     Weight Metrics 5/6/2019 4/26/2019   Weight 136 lb 3.2 oz 140 lb   BMI 20.11 kg/m2 22.6 kg/m2     No data found.     UBW: 145-150 lb  [x] Weight Loss (6 lb or 4.2% since admission x 1 wk)  [] Weight Gain  [x] Weight Stable PTA per Pt    Estimated Nutrition Needs: [x] MSJ    Calories: 1700 Kcal Based on:   [x] Actual BW    Protein:   70-84 g Based on:   [x] Actual BW    Fluid:       2714-2038 ml Based on:   [x] Actual BW      Nutrition Problems Identified:   [] Suboptimal PO intake   [] Food Allergies  [] Difficulty chewing/swallowing/poor dentition  [] Constipation/Diarrhea   [] Nausea/Vomiting   [x] None  [] Other:     Plan:   [x] Therapeutic Diet  [x]  Obtained/adjusted food preferences/tolerances and/or snacks options   []  Supplements added   [] Occupational therapy following for feeding techniques  []  HS snack added   []  Modify diet texture   []  Modify diet for food allergies   []  Educate patient   []  Assist with menu selection   [x]  Monitor PO intake on meal rounds   [x]  Continue inpatient monitoring and intervention   [x]  Participated in discharge planning/Interdisciplinary rounds/Team meetings   []  Other:     Education Needs:   [] Not appropriate for teaching at this time due to:   [x] Identified and addressed    Nutrition Monitoring and Evaluation:  [x] Continue ongoing monitoring and intervention  [] Other    Peace Dominguez

## 2019-05-07 NOTE — ROUTINE PROCESS
0500 patient asleep, no complaints. Bedside and Verbal shift change report given to adeel rn (oncoming nurse) by Migue Ford (offgoing nurse). Report included the following information SBAR, Intake/Output and MAR.

## 2019-05-07 NOTE — ROUTINE PROCESS
0025 patient complained of right hip pain. Prn oxy 5-325mg 1 tablet given for right hip pain. 0100 ice pack applied between legs, per patient's request, to relieve pain in right hip, noted 5 staples intact.

## 2019-05-07 NOTE — ROUTINE PROCESS
Bedside and Verbal shift change report given to Tommy Baca RN (oncoming nurse) by CHIARA Walton RN (offgoing nurse). Report included the following information SBAR, Kardex and MAR.

## 2019-05-07 NOTE — ROUTINE PROCESS
Bedside and Verbal shift change report given to [facility] lpn (oncoming nurse) by yumiko jimenez (offgoing nurse). Report included the following information Kardex, MAR and Recent Results.

## 2019-05-07 NOTE — PROGRESS NOTES
Problem: Mobility Impaired (Adult and Pediatric)  Goal: *Acute Goals and Plan of Care (Insert Text)  Description  ))PHYSICAL THERAPY STG GOALS :  //Initiated 4/30/2019 and to be accomplished within 2 Weeks (Updated 5/7/19)    1. Patient will move from supine to sit and sit to supine  and roll side to side in bed with stand by assistance. (Progressing; Min-Mod A)    2. Patient will transfer from bed to chair and chair to bed with contact guard assist using RW with WB compliance. (Achieved)  3. Patient will perform sit to stand with contact guard assist with Fair+ balance and safety awareness with WB compliance. (Progressing; CGA-Min A)   4. Patient will ambulate with contact guard assist for 75 feet with RW on level surfaces with 2 turns with WB compliance. (Progressing; 100ft with RW and CGA, cues for TTWB and no 1 turn)   5. Patient will ascend/descend 1 step with bilateral handrail(s) with minimal assistance to allow for safe home access/exit with WB compliance. (Not assessed due to increased difficulty with TTWB during ambulation)  6. Patient will improve standardized test score for [place] Standing Balance Scale 3/5 to reduce fall risk. (Progressing; 2+/5)    PHYSICAL THERAPY STG GOALS :  Initiated 4/30/2019 and to be accomplished within 2 Weeks    1. Patient will move from supine to sit and sit to supine  and roll side to side in bed with stand by assistance. 2.  Patient will transfer from bed to chair and chair to bed with contact guard assist using RW with WB compliance. 3.  Patient will perform sit to stand with contact guard assist with Fair+ balance and safety awareness with WB compliance. 4.  Patient will ambulate with contact guard assist for 75 feet with RW on level surfaces with 2 turns with WB compliance. 5.  Patient will ascend/descend 1 step with bilateral handrail(s) with minimal assistance to allow for safe home access/exit with WB compliance.   6.  Patient will improve standardized test score for [place] Standing Balance Scale 3/5 to reduce fall risk. PHYSICAL THERAPY LTG GOALS :  Initiated 4/30/2019 and to be accomplished within 4 Weeks    1. Patient will move from supine to sit and sit to supine  and roll side to side in bed with modified independence. 2.  Patient will transfer from bed to chair and chair to bed with supervision/set-up using RW with WB compliance. 3.  Patient will perform sit to stand with supervision/set-up with Good balance and safety awareness. 4.  Patient will ambulate with supervision/set-up for 200 feet with RW on level surfaces and be able to maneuver through narrow spaces and obstacles without loss of balance with WB compliance. 5.  Patient will ascend/descend 3 stairs with NO handrail(s) with stand by assistance to allow for safe home access/exit. 6.  Patient will improve standardized test score for [place] Standing Balance Scale 4/5 to reduce fall risk.       Physical Therapist:   Chet Cardona PT  on 4/30/2019             Outcome: Progressing Towards Goal   TRANSITIONAL CARE [place]   PHYSICAL THERAPY WEEKLY PROGRESS REPORT  Reporting Period:  Date:  4/30/19  to 5/7/19      Patient: Buddy Her (57 y.o. male)                         Date: 5/7/2019    Primary Diagnosis: rt hip fracture      Attending Physician: Dustin Her MD   Treatment Diagnosis  Treatment Diagnosis: need for assist with self care  Treatment Diagnosis 2: muscle weakness  Precautions:  Fall, TTWB(TTWB RLE)  Rehab Potential : Fair:    Skill interventions and education provided with clinical rationale (include individualized treatment techniques and standardized tests):   Skilled Physical Therapy services were provided with TA to promote greater independence with bed mobility and transfers   TE to build strength, promote flexibility and improve overall functional capacity for ease of mobility  Gait training to address deficits and for TTWB compliance on R LE   Stair training: Not assessed due to safety concerns  NMR of movement, coordination and balance for reduced risk of falls. Using a comparative statement, summarize significant progress toward goals as a result of skilled intervention provided:  Patient has made Good progress towards their Physical Therapy goals in the areas of sit<>stand, transfers, and gait. Identify remaining functional areas, impairments limiting progress and/or barriers to improvement:  Patient would benefit from continues PT services to address the following functional deficits in bed mobility, standing balance, stair training and quality of ambulation with TTWB compliance. Pt is limited by R hip pain and continually requires cues for TTWB.      OBJECTIVE DATA SUMMARY:     INITIAL ASSESSMENT WEEKLY ASSESSMENT   COGNITIVE STATUS COGNITIVE STATUS   Neurologic State: Alert  Orientation Level: Oriented X4(forgetful at times)  Cognition: Follows commands  Perception: Appears intact  Perseveration: No perseveration noted  Safety/Judgement: Fall prevention Neurologic State: Alert  Orientation Level: Oriented to person, Oriented to place  Cognition: Follows commands  Perception: Appears intact  Perseveration: No perseveration noted  Safety/Judgement: Fall prevention   PAIN PAIN   Pain Scale 1: Numeric (0 - 10)  Pain Intensity 1: 4  Pain Onset 1: acute  Pain Location 1: Hip  Pain Orientation 1: Right  Pain Description 1: Aching  Pain Intervention(s) 1: Medication (see MAR)  Patient Stated Pain Goal: 0  Pain Reassessment 1: Yes Pain Scale 1: Numeric (0 - 10)  Pain Intensity 1: 0  Pain Onset 1: now  Pain Location 1: Hip  Pain Orientation 1: Right  Pain Description 1: Aching  Pain Intervention(s) 1: Position, Medication (see MAR)  Patient Stated Pain Goal: 0  Pain Reassessment 1: Yes   GROSS ASSESSMENT GROSS ASSESSMENT   AROM: Generally decreased, functional  PROM: Generally decreased, functional  Strength: Generally decreased, functional(R hip 2-5, R knee 4+/5; L hip 4-/5, L knee 4+/5)  Coordination: Generally decreased, functional  Tone: Normal  Sensation: Intact(BUEs) AROM: Within functional limits(BUEs)  PROM: Generally decreased, functional  Strength: Generally decreased, functional(BUEs 4-/5)  Coordination: Within functional limits(BUEs)  Tone: Normal(BUEs)  Sensation: Intact(BUEs)   BED MOBILITY BED MOBILITY   Supine to Sit: Moderate assistance(for RLE management)  Scooting: Minimum assistance Supine to Sit: Moderate assistance  Scooting: Contact guard assistance   GAIT GAIT   Base of Support: Center of gravity altered  Speed/Tamanna: Slow  Step Length: Right shortened, Left shortened(R > L)  Gait Abnormalities: Antalgic, Decreased step clearance  Ambulation - Level of Assistance: Minimal assistance  Distance (ft): 2 Feet (ft)  Assistive Device: Gait belt, Walker, rolling Base of Support: Center of gravity altered  Speed/Tamanna: Slow  Step Length: Right shortened, Left shortened(R > L)  Gait Abnormalities: Antalgic, Decreased step clearance  Ambulation - Level of Assistance: Contact guard assistance  Distance (ft): 25 Feet (ft)(+ 22ft (12ft +13ft ar parallel bars))  Assistive Device: Gait belt, Other (comment)(at parallel bars )         Toe touch Toe touch   Full Full   TRANSFERS TRANSFERS   Sit to Stand:  Moderate assistance  Stand to Sit: Moderate assistance  Bed to Chair: Minimum assistance Sit to Stand: Minimum assistance, Additional time  Stand to Sit: Contact guard assistance  Bed to Chair: Minimum assistance(w/ RW)   BALANCE BALANCE   Sitting: With support  Sitting - Static: Fair (occasional)  Sitting - Dynamic: Fair (occasional)  Standing: With support, Impaired  Standing - Static: Fair  Standing - Dynamic : Fair(-) Sitting: With support  Sitting - Static: Fair (occasional)(((+)))  Sitting - Dynamic: Fair (occasional)  Standing: With support  Standing - Static: Fair  Standing - Dynamic : Fair   WHEELCHAIR MOBILITY/MGMT WHEELCHAIR MOBILITY/MGMT Activity Tolerance:  Poor Activity Tolerance: Fair   Visual/Perceptual   Corrective Lenses: Reading glasses      Visual/Perceptual   Vision  Corrective Lenses: Reading glasses        Auditory:   Auditory Impairment: Hard of hearing, bilateral    Auditory:   Auditory  Auditory Impairment: Hard of hearing, left side       Steps: NT   Clinical Decision making:   Clinical Decision makin+/5     Treatment:   Pt has made slow, but steady progress with therapy as evidenced by achieving 1/6 STG's and progressing well towards all other goals. Pt continues to be limited by R hip pain, impaired balance and continual cues required for TTWB during transfers and gait training. During today's session; Gait training rendered first at parallel bars with hopping technique with NWB on R LE. Pt required visual demon and verbal cues for technique. Pt completed 12ft and 13ft with good ability to maintain NWB. Gait training with scale placed under R foot and drills for stepping froward/backward with L foot. Pt initially with increased WB on R. Following drills and cues pt improved with TTWB compliance. Gait training progressed with RW and close w/c follow for safety. Pt required constant cues for TTWB and for WB on B UE's. Pt completed ambulation of 22ft and 20ft with CGA. Seated B LE TE's rendered to promote strength and improve overall functional capacity: HR/TR, LAQ, hip flexion, ball squeezes, resisted hip abd (orange band) x15. Therapist discussed pt's progress with pt's spouse and continued limitations. Patient's response to treatment rendered:  fair     Patient expected Discharge Location:  ? Private Residence  ? [facility]  ? LTC  ? Other:    Plan: Continue Skilled PT services as established by the Plan of Care for 3-6 times a week. PT and Assistant have had a weekly case conference regarding the above treatment:  ? Yes     ? No       Treatment session:  60 minutes.       Therapist: Sheryl Cotto PTA Date:5/7/2019  Forward to PT for co-signature when completed.

## 2019-05-08 LAB
GLUCOSE BLD STRIP.AUTO-MCNC: 120 MG/DL (ref 70–110)
GLUCOSE BLD STRIP.AUTO-MCNC: 120 MG/DL (ref 70–110)
GLUCOSE BLD STRIP.AUTO-MCNC: 138 MG/DL (ref 70–110)
GLUCOSE BLD STRIP.AUTO-MCNC: 173 MG/DL (ref 70–110)

## 2019-05-08 PROCEDURE — 74011636637 HC RX REV CODE- 636/637: Performed by: INTERNAL MEDICINE

## 2019-05-08 PROCEDURE — 74011250637 HC RX REV CODE- 250/637: Performed by: NURSE PRACTITIONER

## 2019-05-08 PROCEDURE — 74011250637 HC RX REV CODE- 250/637: Performed by: INTERNAL MEDICINE

## 2019-05-08 PROCEDURE — 82962 GLUCOSE BLOOD TEST: CPT

## 2019-05-08 RX ADMIN — OXYCODONE HYDROCHLORIDE AND ACETAMINOPHEN 1 TABLET: 5; 325 TABLET ORAL at 08:26

## 2019-05-08 RX ADMIN — METFORMIN HYDROCHLORIDE 1000 MG: 500 TABLET ORAL at 17:05

## 2019-05-08 RX ADMIN — INSULIN LISPRO 2 UNITS: 100 INJECTION, SOLUTION INTRAVENOUS; SUBCUTANEOUS at 12:16

## 2019-05-08 RX ADMIN — OXYCODONE HYDROCHLORIDE AND ACETAMINOPHEN 2 TABLET: 10; 325 TABLET ORAL at 12:16

## 2019-05-08 RX ADMIN — DOCUSATE SODIUM 100 MG: 100 CAPSULE, LIQUID FILLED ORAL at 08:26

## 2019-05-08 RX ADMIN — SIMVASTATIN 20 MG: 20 TABLET, FILM COATED ORAL at 21:19

## 2019-05-08 RX ADMIN — LISINOPRIL 10 MG: 10 TABLET ORAL at 08:26

## 2019-05-08 RX ADMIN — PANTOPRAZOLE SODIUM 40 MG: 40 TABLET, DELAYED RELEASE ORAL at 08:26

## 2019-05-08 RX ADMIN — METFORMIN HYDROCHLORIDE 1000 MG: 500 TABLET ORAL at 08:26

## 2019-05-08 RX ADMIN — WARFARIN SODIUM 2 MG: 2 TABLET ORAL at 17:05

## 2019-05-08 RX ADMIN — CHOLECALCIFEROL (VITAMIN D3) 25 MCG (1,000 UNIT) TABLET 1000 UNITS: at 08:26

## 2019-05-08 RX ADMIN — OXYCODONE HYDROCHLORIDE AND ACETAMINOPHEN 1 TABLET: 5; 325 TABLET ORAL at 03:55

## 2019-05-08 NOTE — ROUTINE PROCESS
Bedside, Verbal and Written shift change report given to conrad jimenez (oncoming nurse) by Melody Hair (offgoing nurse). Report included the following information SBAR, Intake/Output and MAR.

## 2019-05-08 NOTE — ROUTINE PROCESS
Patient given x2 oxycodone for c/o pain in right leg with desirable results, will continue to monitor resting quietly at this time.

## 2019-05-08 NOTE — PROGRESS NOTES
[facility] [facility]   PHYSICAL THERAPY DAILY TREATMENT NOTE      Patient: Reuben Rowe (89 y.o. male)               Date: 5/8/2019    Physician: Aimee Garibay MD  Primary Diagnosis: rt hip fracture          Treatment Diagnosis  Treatment Diagnosis: need for assist with self care  Treatment Diagnosis 2: muscle weakness  Precautions: Fall, TTWB(TTWB RLE)  Vital Signs  Vital Signs  Temp: 97 °F (36.1 °C)  Temp Source: Oral  Pulse (Heart Rate): 63  Resp Rate: 16  O2 Sat (%): 94 %  Level of Consciousness: Alert  BP: 117/53  MAP (Calculated): 74     Cognitive Status:  Mental Status  Neurologic State: Alert  Orientation Level: Oriented to person;Oriented to place;Oriented to situation  Cognition: Follows commands  Pain  Bed Mobility Training  Bed Mobility Training  Supine to Sit: Moderate assistance  Scooting: Contact guard assistance; Additional time  Interventions: Safety awareness training;Verbal cues; Tactile cues  Balance  Sitting: With support  Sitting - Static: Fair (occasional)(((+)))  Sitting - Dynamic: Fair (occasional)  Standing: With support  Standing - Static: Fair  Standing - Dynamic : Fair  Transfer Training  Transfer Training  Sit to Stand: Minimum assistance; Additional time  Stand to Sit: Contact guard assistance  Bed to Chair: Minimum assistance  Sit to Stand: Minimum assistance; Additional time  Gait Training  Right Side Weight Bearing: Toe touch  Treatment: Pt presented supine in bed. Pt educated on use of bed rail for supine>sit as pt attempts to reach out for therapist and stated \"Help me\". Following instruction, pt utilized bed rail and required Mod A for upper body. Pt initially with L lateral lean in sitting, but progressed to upright sitting without support. Sti>stand with cues for TTWB compliance. Pt stood at bedside for ~1' with good TTWB noted and CGA. Stand-step transfer EOB>w/c with RW and cues for TTWB ~2ft to achieve w/c.  Pt remained sitting up in w/c at bedside for breakfast, call bell at side. Patient/Caregiver Education:   Pt /Caregiver Education on safety and fall prevention. Pt educated on use of call bell and not attempting to get up unassisted. Pt verbalized understanding, call bell placed at side. ASSESSMENT:  Patient continues to benefit from Skilled PT services to improve bed mobility, sit<>stand, transfers, strength, balance, and gait. Progression toward goals:  ?      Improving appropriately and progressing toward goals  ? Improving slowly and progressing toward goals  ? Not making progress toward goals and plan of care will be adjusted     Treatment session: 19 minutes.   Therapist:   Amadeo Brewer PTA,          5/8/2019

## 2019-05-08 NOTE — PROGRESS NOTES
[address] [facility]   PHYSICAL THERAPY DAILY TREATMENT NOTE      Patient: Steff Fitch (27 y.o. male)               Date: 5/8/2019    Physician: Mili Hernadez MD  Primary Diagnosis: rt hip fracture          Treatment Diagnosis  Treatment Diagnosis: need for assist with self care  Treatment Diagnosis 2: muscle weakness  Precautions: Fall, TTWB(TTWB RLE)  Vital Signs  Vital Signs  Temp: 97 °F (36.1 °C)  Temp Source: Oral  Pulse (Heart Rate): 63  Resp Rate: 16  O2 Sat (%): 94 %  BP: 117/53  MAP (Calculated): 74     Cognitive Status:  Mental Status  Neurologic State: Alert  Orientation Level: Oriented to person;Oriented to place;Oriented to situation  Cognition: Decreased attention/concentration  Pain  Pain Screen  Pain Scale 1: Numeric (0 - 10)  Pain Intensity 1: 8  Pain Location 1: Hip  Pain Orientation 1: Right  Pain Description 1: Aching  Pain Intervention(s) 1: Medication (see MAR); Distraction; Family Support  Bed Mobility Training  Bed Mobility Training  Supine to Sit: Minimum assistance  Scooting: Contact guard assistance; Additional time  Interventions: Safety awareness training;Verbal cues; Tactile cues  Balance  Sitting: With support  Sitting - Static: Fair (occasional)(+)  Sitting - Dynamic: Fair (occasional)  Standing: With support  Standing - Static: Fair  Standing - Dynamic : Fair  Transfer Training  Transfer Training  Sit to Stand: Contact guard assistance  Stand to Sit: Contact guard assistance  Bed to Chair: Minimum assistance  Sit to Stand: Contact guard assistance  Gait Training  Gait  Base of Support: Center of gravity altered  Speed/Tamanna: Slow  Step Length: Right shortened  Stance: Left decreased  Gait Abnormalities: Antalgic;Decreased step clearance  Ambulation - Level of Assistance: Contact guard assistance  Distance (ft): 106 Feet (ft)(101)  Assistive Device: Gait belt;Walker, rolling     Gait Abnormalities: Antalgic;Decreased step clearance  Right Side Weight Bearing:  Toe touch         With 4 turns. Treatment:    Pt presents in bed, required minimal encouragement for participation. Bed mobility with min A for RLE management and to achieve upright sitting at EOB from supine. Education on TTWB through RLE, pt remembering it as \"heel touching\", required extensive visual, verbal cueing and demonstration for proper return demonstration from patient. Gait training as mentioned above, verbal cueing for compliance with TTWB especially with fatigue. Slow gait speed noted with decreased step length on R. TE rendered to improve strength, endurance, mobility and included: LAQ, toe raises 15 reps x 2 sets with visual and verbal cueing for proper techniques. Patient/Caregiver Education:   Pt /Caregiver Education on [employer] through RLE during standing and ambulation. ASSESSMENT:  Patient continues to benefit from Skilled PT services to improve gait pattern, balance, endurance. Progression toward goals:  ?      Improving appropriately and progressing toward goals  ? Improving slowly and progressing toward goals  ? Not making progress toward goals and plan of care will be adjusted     Treatment session: 42 minutes.   Therapist:   Kerri Foss PT,          5/8/2019

## 2019-05-08 NOTE — ROUTINE PROCESS
@ 0826 Percocet 5-325 mg 1 tab po for complaints of right hip pain. Sat up in chair for breakfast.  @1030 related pain better 3/10, reposition in bed for comfort. .  @2211 Percocet  mg 2 tabs at patients request for right hip pain 8/10. Wife at bedside. @1340 patient asleep on round.

## 2019-05-09 ENCOUNTER — PATIENT OUTREACH (OUTPATIENT)
Dept: CASE MANAGEMENT | Age: 84
End: 2019-05-09

## 2019-05-09 ENCOUNTER — HOSPITAL ENCOUNTER (OUTPATIENT)
Dept: GENERAL RADIOLOGY | Age: 84
Discharge: HOME | End: 2019-05-09
Attending: INTERNAL MEDICINE

## 2019-05-09 LAB
GLUCOSE BLD STRIP.AUTO-MCNC: 128 MG/DL (ref 70–110)
GLUCOSE BLD STRIP.AUTO-MCNC: 137 MG/DL (ref 70–110)
GLUCOSE BLD STRIP.AUTO-MCNC: 153 MG/DL (ref 70–110)
GLUCOSE BLD STRIP.AUTO-MCNC: 163 MG/DL (ref 70–110)
INR PPP: 2.3 (ref 0.8–1.2)
PROTHROMBIN TIME: 25.6 SEC (ref 11.5–15.2)

## 2019-05-09 PROCEDURE — 85610 PROTHROMBIN TIME: CPT

## 2019-05-09 PROCEDURE — 74011250637 HC RX REV CODE- 250/637: Performed by: NURSE PRACTITIONER

## 2019-05-09 PROCEDURE — 36415 COLL VENOUS BLD VENIPUNCTURE: CPT

## 2019-05-09 PROCEDURE — 82962 GLUCOSE BLOOD TEST: CPT

## 2019-05-09 PROCEDURE — 74011250637 HC RX REV CODE- 250/637: Performed by: INTERNAL MEDICINE

## 2019-05-09 PROCEDURE — 74011636637 HC RX REV CODE- 636/637: Performed by: INTERNAL MEDICINE

## 2019-05-09 PROCEDURE — 73502 X-RAY EXAM HIP UNI 2-3 VIEWS: CPT

## 2019-05-09 RX ADMIN — SIMVASTATIN 20 MG: 20 TABLET, FILM COATED ORAL at 21:23

## 2019-05-09 RX ADMIN — LISINOPRIL 10 MG: 10 TABLET ORAL at 08:56

## 2019-05-09 RX ADMIN — INSULIN LISPRO 2 UNITS: 100 INJECTION, SOLUTION INTRAVENOUS; SUBCUTANEOUS at 12:16

## 2019-05-09 RX ADMIN — POLYETHYLENE GLYCOL 3350 17 G: 17 POWDER, FOR SOLUTION ORAL at 08:56

## 2019-05-09 RX ADMIN — METFORMIN HYDROCHLORIDE 1000 MG: 500 TABLET ORAL at 08:56

## 2019-05-09 RX ADMIN — INSULIN LISPRO 2 UNITS: 100 INJECTION, SOLUTION INTRAVENOUS; SUBCUTANEOUS at 21:23

## 2019-05-09 RX ADMIN — METFORMIN HYDROCHLORIDE 1000 MG: 500 TABLET ORAL at 17:56

## 2019-05-09 RX ADMIN — WARFARIN SODIUM 2 MG: 2 TABLET ORAL at 17:56

## 2019-05-09 RX ADMIN — DOCUSATE SODIUM 100 MG: 100 CAPSULE, LIQUID FILLED ORAL at 08:56

## 2019-05-09 RX ADMIN — OXYCODONE HYDROCHLORIDE AND ACETAMINOPHEN 2 TABLET: 10; 325 TABLET ORAL at 01:02

## 2019-05-09 RX ADMIN — OXYCODONE HYDROCHLORIDE AND ACETAMINOPHEN 2 TABLET: 10; 325 TABLET ORAL at 21:23

## 2019-05-09 RX ADMIN — PANTOPRAZOLE SODIUM 40 MG: 40 TABLET, DELAYED RELEASE ORAL at 08:56

## 2019-05-09 RX ADMIN — CHOLECALCIFEROL (VITAMIN D3) 25 MCG (1,000 UNIT) TABLET 1000 UNITS: at 08:56

## 2019-05-09 RX ADMIN — OXYCODONE HYDROCHLORIDE AND ACETAMINOPHEN 2 TABLET: 10; 325 TABLET ORAL at 08:58

## 2019-05-09 NOTE — ROUTINE PROCESS
[address] called and relayed telephone order made by dr. Alesia He, to do right hip xray today.

## 2019-05-09 NOTE — ROUTINE PROCESS
Patient wife at bedside made aware of xray results both were happy. Patient reports pain relief from oral  Percocet  mg 2 tabs at 0858.

## 2019-05-09 NOTE — PROGRESS NOTES
[facility]   PHYSICAL THERAPY DAILY TREATMENT NOTE      Patient: Reuben Rowe (89 y.o. male)               Date: 5/9/2019    Physician: Aimee Garibay MD  Primary Diagnosis: rt hip fracture          Treatment Diagnosis  Treatment Diagnosis: need for assist with self care  Treatment Diagnosis 2: muscle weakness  Precautions: Fall, TTWB(TTWB RLE)  Vital Signs  Vital Signs  Temp: 97.4 °F (36.3 °C)  Temp Source: Oral  Pulse (Heart Rate): 62  Resp Rate: 16  O2 Sat (%): 95 %  Level of Consciousness: Alert  BP: 119/56  MAP (Calculated): 77  Cognitive Status:  Mental Status  Neurologic State: Alert  Orientation Level: Oriented to person;Oriented to place  Cognition: Poor safety awareness  Pain  Pain Screen  Pain Scale 1: Numeric (0 - 10)  Pain Intensity 1: 6  Pain Location 1: Hip  Pain Orientation 1: Right  Pain Description 1: Aching  Pain Intervention(s) 1: Medication (see MAR); Family Support;Food;Distraction  Bed Mobility Training  Bed Mobility Training  Supine to Sit: Contact guard assistance  Balance  Transfer Training  Gait Training  Treatment: Session One: Pt presented supine in bed. Per NSG, pt had a fall last night. Pt reported some R hip pain. Pt waiting on xray at this time. Pt's wife at bedside. Therapist discussed waiting on xray results before OOB therapy session. Pt and pt's wife verbalized understanding. Therapist to check back later following xray. Session Two: Pt awaiting xray results, Per NSG, pt ok for bed level therapy at this time. Following consulting with evaluating therapist R LE TE's held at this time while awaiting xray results. Pt participated in bed level TE's for L LE only to include: ankle pumps, heel slides, quad sets, hip abd/add, SAQ 2x15. Pt assisted with repositioning in bed with attempt at single let bridging, with continual cues for technique. Patient/Caregiver Education:   Pt /Caregiver Education on safety and fall prevention.  Therapist spoke with pt's wife regarding today's treatment due to fall last night, see note above. ASSESSMENT:  Patient continues to benefit from Skilled PT services to improve bed mobility, sit<>stand, transfers, strength, balance, and gait. Progression toward goals:  ?      Improving appropriately and progressing toward goals  ? Improving slowly and progressing toward goals  ? Not making progress toward goals and plan of care will be adjusted     Treatment session: 38 minutes.   Therapist:   Maria Alejandra Domínguez PTA,          5/9/2019

## 2019-05-09 NOTE — ROUTINE PROCESS
Re education with both patient and wife that patient must call for help out of bed and for patient to not attempt getting out of bed with out calling. Both verbalized understanding. Call bell with in reach.

## 2019-05-09 NOTE — PROGRESS NOTES
Problem: Self Care Deficits Care Plan (Adult) Goal: *Acute Goals and Plan of Care (Insert Text) Description 
-CLOF: OCCUPATIONAL THERAPY SHORT TERM GOALS Updated 5/6/19 1. Patient will perform Upper body ADL's without adaptive equipment with modified independence. 2.  Patient will perform Lower body ADL's with adaptive equipment with moderate assistance. 3.  Patient will perform toileting task with moderate assistance  with Good safety to reduce falls risk. 4.  Patient will perform bedside commode transfers with Carlee Edelson and minimal assistance while adhering to RLE TTWB. 5.  Patient will perform standing static/dynamic balance activities for improved ADL function with minimal assistance and Fair+ balance and safety awareness while adhering to RLE TTWB. 6.  Patient will improve Barthel index scores to atleast 42247 to improve functional mobility. 7.  Patient will utilize energy conservation techniques during functional activities with minimal verbal cues. Initiated 4/30/2019 and to be accomplished within 2 [facility]) 1. Patient will perform Upper body ADL's without adaptive equipment with modified independence. -CLOF: bathing and dressing w/ set up 2. Patient will perform Lower body ADL's with adaptive equipment with moderate assistance. -CLOF: bathing and dressing w/ Max A 3. Patient will perform toileting task with moderate assistance  with Good safety to reduce falls risk. -CLOF: Mod I w/ bladder mgmt using urinal. Dep bowel mgmt, Max A clothing mgmt 4. Patient will perform bedside commode transfers with Rolling Walker and minimal assistance while adhering to RLE TTWB.-CLOF: CGA w/ RW while adhering to RLE TTWB 5.   Patient will perform standing static/dynamic balance activities for improved ADL function with minimal assistance and Fair+ balance and safety awareness while adhering to RLE TTWB.-CLOF: Karmen static and Fair dynamic w/ RW & CGA while adhering to RLE TTWB 
 6.  Patient will improve Barthel index scores to atleast 72233 to improve functional mobility. -CLOF: 55/100 
7. Patient will utilize energy conservation techniques during functional activities with minimal verbal cues. -CLOF:  Max verbal cues OCCUPATIONAL THERAPY LONG TERM GOALS Initiated 4/30/2019 and to be accomplished within 4 Week(s) 1. Patient will perform ADL's with adaptive equipment as needed with modified independence. 2.  Patient will perform toileting task with modified independence with Good safety to reduce falls risk. 3.  Patient will perform all functional transfers utilizing least restrictive device and durable medical equipment as needed with modified independence and Good balance and safety awareness. 4.  Patient will perform standing static/dynamic activity for improved ADL function with modified independence and Good balance and safety awareness. 5.  Patient will improve Barthel index score to 95/100 to improve independence with mobility. 6. Patient will perform UE HEP with Modified [place] to increase BUE strength for continued participation in ADLs. Therapist: Deborah Gottlieb    4/30/2019 Outcome: Progressing Towards Goal 
 [facility]  
OCCUPATIONAL THERAPY DAILY TREATMENT NOTE Patient: Bhavna Pedro (30 y.o. male)       Date: 5/9/2019 Attending Physician: Joellen Orellana MD 
Primary Diagnosis: rt hip fracture Treatment Diagnosis Treatment Diagnosis: need for assist with self care Treatment Diagnosis 2: muscle weakness Precautions : Precautions at Admission: Fall, TTWB(TTWB RLE) Vital Signs: 
Vital Signs Temp: 97.4 °F (36.3 °C) Temp Source: Oral 
Pulse (Heart Rate): 62 Resp Rate: 16 
O2 Sat (%): 95 % Level of Consciousness: Alert BP: 119/56 MAP (Calculated): 77 
  
Cognitive Status: 
Mental Status Neurologic State: Alert Orientation Level: Oriented to person;Oriented to place Cognition: Poor safety awareness Pain: 
Pain Screen Pain Scale 1: Numeric (0 - 10) Pain Intensity 1: 6 Pain Location 1: Hip Pain Orientation 1: Right Pain Description 1: Aching Pain Intervention(s) 1: Medication (see MAR); Family Support;Food;Distraction Pain Scale 1: Numeric (0 - 10) Gross Assessment: 
  
Coordination: 
  
Bed Mobility: 
Bed Mobility Supine to Sit: Contact guard assistance Transfers: 
Functional Transfers Sit to Stand: Minimum assistance; Moderate assistance Balance: 
 F static standing balance ADL Self Care: 
Basic ADL Type of Bath: Basin/Soap/Water ADL Intervention: 
Upper Body Bathing Bathing Assistance: Moderate assistance Position Performed: Long sitting on bed;Seated edge of bed;Standing Cues: Verbal cues provided;Visual cues provided Upper Body Dressing Assistance Hospital Gown: Minimum  assistance Lower Body Bathing Bathing Assistance: Maximum assistance Perineal  : Maximum assistance Position Performed: Standing;Supine Cues: Verbal cues provided;Visual cues provided Pt engaged in ADL training in supine position increasing to EOB and standing for perineal care. Pt demonstrated ability to wash pt face, BUE arms and chest and required assistance to wash pt groin area, buttocks, BLE legs and feet. Pt donned gown with Silas. Pt required total assist to don BLE socks. OTR provided setup for self feeding and placed call bell and necessities within reach. Pt lunched arrived and pt declined further OT at that time. OTR reported will attempt later in day. Instrumental ADL's: 
 
Therapeutic Activities: 
Upon arrival PTA reported to OTR pt had unwitnessed fall last night that affected pt right side and x-ray performed this morning and recommended bed side activities while awaiting results. Pt performed bed mobility following education for increased safety using log rolling technique abiding safety techniques.  [place] through session [facility] arrived and reported results of x-ray were negative and pt could resume with therapy as prior. Following report of results pt performed supine to sit with Silas and sit to stand from EOB with min-modA. Therapeutic Exercises: 
 
Patient/Caregiver Education:   
Pt. Eva Liang Education on ADL training for increased safety for UB/LB bathing via sponge bath to decrease risk for falls and safe sit to  prep for functional transfer for self cares, use of call bell and fall prevention and increased safety with self cares was provided to  pt. ASSESSMENT: 
Patient continues to demonstrate the need for skilled Occupational Therapy services to improve performance and independence with toileting, dressing and bathing, grooming and hygiene and oral care to maximize pt potential to DC and return home safely. Progression toward goals: 
?      Improving appropriately and progressing toward goals ? Improving slowly and progressing toward goals ? Not making progress toward goals and plan of care will be adjusted Treatment session:   48 minutes Therapist:    Carlos Chappell OT,  5/9/2019

## 2019-05-09 NOTE — PROGRESS NOTES
Community Care Team Documentation for Patient in [facility]     Patient discharged from [facility] 4/26/2019 - 4/29/2019 to [facility], [address], on 4/29/2019. Hospital Discharge diagnosis:  Hip fracture. SNF Attending Provider:     Anticipated discharge date from SNF:       PCP : Drew Adame MD    Nurse Navigator:     [facility] [place], updates provided by facility. Low Risk            5       Total Score        3 Has Seen PCP in Last 6 Months (Yes=3, No=0)    2 . Living with Significant Other. Assisted Living. LTAC. SNF. or   Rehab        Criteria that do not apply:    Patient Length of Stay (>5 days = 3)    IP Visits Last 12 Months (1-3=4, 4=9, >4=11)    Pt.  Coverage (Medicare=5 , Medicaid, or Self-Pay=4)    Charlson Comorbidity Score (Age + Comorbid Conditions)      Active Ambulatory Problems     Diagnosis Date Noted    Hip fracture ([address] 04/26/2019    Closed right hip fracture, initial encounter ([address] 04/26/2019    Atrial fibrillation ([address] 04/26/2019    HTN (hypertension) 04/26/2019     Resolved Ambulatory Problems     Diagnosis Date Noted    No Resolved Ambulatory Problems     Past Medical History:   Diagnosis Date    Alzheimer's disease     Atrial fibrillation ([address]     Diabetes ([address]     Hypertension

## 2019-05-09 NOTE — ROUTINE PROCESS
Called patient's wife, Mrs. Anna Simpson and informed her about what happened, made her aware that the patient had an unwitnessed fall going to the bathroom, told her that the doctor ordered for right hip xray and that nursing supervisor and nursing director was informed about happened.

## 2019-05-09 NOTE — ROUTINE PROCESS
0250 patient found on the floor sitting, with walker on the ground. I asked the patient why he didn't use the call bell, call lord was in his bed, Patient stated \"it's much faster to got to the bathroom by myself \" I then explained to the patient that ever since he got here, he's been using the call bell to ask for assistance, be it in using the bathroom or bedside commode to move his bowels. Yany Guerrero came and helped me assist the patient onto the wheelchair, I assisted the patient to the bathroom, Chel Juan then came to help the patient get cleaned up after moving his bowels. Ice pack applied to right leg per patient's request.    Kimberly made aware of the event.     Miguel Angel Harmon left a voice call at Dr. Yanni Canela office, c/o Betty Rodas, and was made aware of the incident, awaiting call back and further orders. [address] done    0310 all side rails up, call bell placed beside patient and was instructed  Again to call for help.

## 2019-05-09 NOTE — ROUTINE PROCESS
Bedrest maintained till results of hip xray obtained. Spoke with Juanjo Jackson NP via telephone reviewed hip results she clarified patient may be out of bed and continue with physical therapy. Spoke with OT and PT and made them both aware of this information.

## 2019-05-09 NOTE — PROGRESS NOTES
Admission 5 day Assessment completed; required extensive assistance with ADLS per observational period of 4/30-5/6/19; per CNA staff interviews.

## 2019-05-09 NOTE — ROUTINE PROCESS
Bedside and Verbal shift change report given to conrad jimenez (oncoming nurse) by Mazin Salomon (offgoing nurse). Report included the following information SBAR, Intake/Output and MAR.

## 2019-05-10 LAB
GLUCOSE BLD STRIP.AUTO-MCNC: 116 MG/DL (ref 70–110)
GLUCOSE BLD STRIP.AUTO-MCNC: 139 MG/DL (ref 70–110)
GLUCOSE BLD STRIP.AUTO-MCNC: 140 MG/DL (ref 70–110)
GLUCOSE BLD STRIP.AUTO-MCNC: 235 MG/DL (ref 70–110)

## 2019-05-10 PROCEDURE — 74011250637 HC RX REV CODE- 250/637: Performed by: INTERNAL MEDICINE

## 2019-05-10 PROCEDURE — 82962 GLUCOSE BLOOD TEST: CPT

## 2019-05-10 PROCEDURE — 74011636637 HC RX REV CODE- 636/637: Performed by: INTERNAL MEDICINE

## 2019-05-10 PROCEDURE — 74011250637 HC RX REV CODE- 250/637: Performed by: NURSE PRACTITIONER

## 2019-05-10 RX ADMIN — POLYETHYLENE GLYCOL 3350 17 G: 17 POWDER, FOR SOLUTION ORAL at 09:03

## 2019-05-10 RX ADMIN — OXYCODONE HYDROCHLORIDE AND ACETAMINOPHEN 2 TABLET: 10; 325 TABLET ORAL at 04:32

## 2019-05-10 RX ADMIN — DOCUSATE SODIUM 100 MG: 100 CAPSULE, LIQUID FILLED ORAL at 09:02

## 2019-05-10 RX ADMIN — CHOLECALCIFEROL (VITAMIN D3) 25 MCG (1,000 UNIT) TABLET 1000 UNITS: at 09:02

## 2019-05-10 RX ADMIN — WARFARIN SODIUM 2 MG: 2 TABLET ORAL at 17:26

## 2019-05-10 RX ADMIN — METFORMIN HYDROCHLORIDE 1000 MG: 500 TABLET ORAL at 17:26

## 2019-05-10 RX ADMIN — OXYCODONE HYDROCHLORIDE AND ACETAMINOPHEN 2 TABLET: 10; 325 TABLET ORAL at 11:51

## 2019-05-10 RX ADMIN — PANTOPRAZOLE SODIUM 40 MG: 40 TABLET, DELAYED RELEASE ORAL at 07:30

## 2019-05-10 RX ADMIN — METFORMIN HYDROCHLORIDE 1000 MG: 500 TABLET ORAL at 09:02

## 2019-05-10 RX ADMIN — INSULIN LISPRO 4 UNITS: 100 INJECTION, SOLUTION INTRAVENOUS; SUBCUTANEOUS at 11:45

## 2019-05-10 RX ADMIN — LISINOPRIL 10 MG: 10 TABLET ORAL at 09:02

## 2019-05-10 RX ADMIN — SIMVASTATIN 20 MG: 20 TABLET, FILM COATED ORAL at 21:26

## 2019-05-10 NOTE — ROUTINE PROCESS
Bedside, Verbal and Written shift change report given to conrad jimenez (oncoming nurse) by Kelle Carter (offgoing nurse). Report included the following information SBAR, Intake/Output and MAR.

## 2019-05-10 NOTE — PROGRESS NOTES
[address] [facility]   PHYSICAL THERAPY DAILY TREATMENT NOTE      Patient: Louis Meade (59 y.o. male)               Date: 5/10/2019    Physician: Zaida Linares MD  Primary Diagnosis: rt hip fracture          Treatment Diagnosis  Treatment Diagnosis: need for assist with self care  Treatment Diagnosis 2: muscle weakness  Precautions: Fall, TTWB(TTWB RLE)  Vital Signs  Vital Signs  Temp: 97.6 °F (36.4 °C)  Pulse (Heart Rate): 66  Resp Rate: 18  O2 Sat (%): 98 %  BP: 150/53     Cognitive Status:  Mental Status  Neurologic State: Alert  Orientation Level: Oriented to place;Oriented to person;Oriented to situation  Cognition: Follows commands  Pain  Pain Screen  Pain Scale 1: Numeric (0 - 10)  Pain Intensity 1: 0  Pain Intervention(s) 1: Declines  Patient Stated Pain Goal: 0  Bed Mobility Training  Bed Mobility Training  Supine to Sit: Moderate assistance  Scooting: Minimum assistance; Additional time  Interventions: Safety awareness training;Verbal cues; Tactile cues  Balance  Sitting: With support  Sitting - Static: Fair (occasional)(((+)))  Sitting - Dynamic: Fair (occasional)  Standing: With support  Standing - Static: Fair  Standing - Dynamic : Fair(((-)))  Transfer Training  Transfer Training  Sit to Stand: Moderate assistance  Stand to Sit: Contact guard assistance  Bed to Chair: Contact guard assistance; Additional time  Sit to Stand: Moderate assistance  Gait Training  Gait  Base of Support: Center of gravity altered  Speed/Tamanna: Slow  Step Length: Right shortened;Left shortened  Stance: Left decreased  Gait Abnormalities: Antalgic;Decreased step clearance  Ambulation - Level of Assistance: Contact guard assistance  Distance (ft): 24 Feet (ft)  Assistive Device: Gait belt;Walker, rolling  Gait Abnormalities: Antalgic;Decreased step clearance  Right Side Weight Bearing: Toe touch    With 0  turns. Treatment: Pt presented supine in bed.  Pt required Mod A for R LE to EOB and Mod A for upper body into full upright sitting at EOB with additional time, effort and cues for use of bed rail. Sit>stand throughout session with cues verbal and tactile for hand placement. Stand-step transfer EOB>w/c with RW and cue for TTWB on R LE with CGA. Gait training with close w/c follow and continual cues fro TTWB x24ft. Pt stated \"my hip is sore\" and declined additional gait training at this time. Seated B LE TE's rendered to promote strength and flexibility for improved functional mobility: LAQ, hip flexion, ball squeezes x15 with verbal and visual cues for technique and to stay on task. Patient/Caregiver Education:   Pt /Caregiver Education on safety and fall prevention. Pt required cues for hand placement 100% of the time with sit<>stand for reduced risk of falls. Little carryover noted from last session. ASSESSMENT:  Patient continues to benefit from Skilled PT services to improve bed mobility, sit<>stand, transfers, strength, balance, and gait. Progression toward goals:  ?      Improving appropriately and progressing toward goals  ? Improving slowly and progressing toward goals  ? Not making progress toward goals and plan of care will be adjusted     Treatment session: 30 minutes.   Therapist:   Sheryl Cotto PTA,          5/10/2019

## 2019-05-10 NOTE — PROGRESS NOTES
conducted a Follow up consultation and Spiritual Assessment for Elisabeth Marie, who is a 80 y. o.,male. The  provided the following Interventions:  Continued the relationship of care and support. Listened empathically. Offered prayer and assurance of continued prayer on patients behalf. Chart reviewed. The following outcomes were achieved:  Patient expressed gratitude for pastoral care visit. Assessment:  There are no further spiritual or Sabianist issues which require Spiritual Care Services interventions at this time. Plan:  Chaplains will continue to follow and will provide pastoral care on an as needed/requested basis.  recommends bedside caregivers page  on duty if patient shows signs of acute spiritual or emotional distress.      [facility]   Staff [address] [facility]   [phone]

## 2019-05-10 NOTE — ROUTINE PROCESS
Wife in most of shift, frequent rounding every 1-2 hours, patient sat up for meals and now back in bed for rest.

## 2019-05-10 NOTE — ROUTINE PROCESS
Patient given x 2 Oxycodone at 2124 for c/o pain in right leg, rechecked pain level at 2215, pt resting in bed with eyes closed.

## 2019-05-10 NOTE — ROUTINE PROCESS
7 staples removed from right hip steri strips applied, No drainage and wound well approximated. Patient to,erated well.

## 2019-05-10 NOTE — PROGRESS NOTES
[facility]    Daily progress Note    Patient: Clover Kelly MRN: [id number]  CSN: [id number]    YOB: 1931  Age: 80 y.o. Sex: male    DOA: 4/29/2019 LOS:  LOS: 11 days                    Subjective:     CC: follow up fall    Nursing reporting that patient had a unwitnessed fall on 5/8, report pain to right hip. He had XRAY done showed Interval internal fixation of subcapital fracture with 3 cannulated screws. Alignment is stable with no new fractures identified. Patient denies any worsening patient. He report that he is tolerating PT. Patient to have staples removed from surgical siet today. He denies any issues. Report last BM was today, and appetite is good. INR is at 2.3. PAST MEDICAL HX: Alzheimer's Disease, HTN AFIB, DM type 2      PAST SURGICAL Hx: hernia repair     SOCIAL Hx:      ALLERGIES: NKDA     ROSCONST: Fever, weight loss, fatigue or chills  HEENT: Recent changes in vision, vertigo  CV: Chest pain, palpitations, edema and varicosities  RESP: Cough, shortness of breath, wheezing  GI: Nausea, vomiting, abdominal pain, change in bowel habits,  : Hematuria, dysuria, frequency  MS: Weakness, right hip sorness  LYMPH/HEME: Anemia, bruising and history of blood transfusions  INTEG: Dermatitis, abnormal moles  NEURO: Dizziness, headache, fainting, seizures and stroke  PSYCH: Anxiety and depression      Objective:      Visit Vitals  /53   Pulse 66   Temp 97.6 °F (36.4 °C)   Resp 18   Ht 5' 9\" (1.753 m)   Wt 61.8 kg (136 lb 3.2 oz)   SpO2 98%   BMI 20.11 kg/m²       Physical Exam:  General appearance: alert, cooperative, no distress, appears stated age  HEENT: negative  Neck: supple, symmetrical, trachea midline, no adenopathy, thyroid: not enlarged, symmetric, no tenderness/mass/nodules, no carotid bruit and no JVD  Lungs: clear to auscultation bilaterally  Heart: regular rate and rhythm, S1, S2 normal, no murmur, click, rub or gallop  Abdomen: soft, non-tender. Bowel sounds normal. No masses,  no organomegaly  Pulses: 2+ and symmetric  Skin: Skin color, texture, turgor normal. No rashes or lesions staples to right hip    Intake and Output:  Current Shift:  No intake/output data recorded. Last three shifts:  No intake/output data recorded.     Recent Results (from the past 24 hour(s))   GLUCOSE, POC    Collection Time: 05/09/19  5:17 PM   Result Value Ref Range    Glucose (POC) 137 (H) 70 - 110 mg/dL   GLUCOSE, POC    Collection Time: 05/09/19  9:22 PM   Result Value Ref Range    Glucose (POC) 153 (H) 70 - 110 mg/dL   GLUCOSE, POC    Collection Time: 05/10/19  4:31 AM   Result Value Ref Range    Glucose (POC) 116 (H) 70 - 110 mg/dL   GLUCOSE, POC    Collection Time: 05/10/19 11:44 AM   Result Value Ref Range    Glucose (POC) 235 (H) 70 - 110 mg/dL         Current Facility-Administered Medications:     docusate sodium (COLACE) capsule 100 mg, 100 mg, Oral, DAILY, Nelly Hansen NP, 100 mg at 05/10/19 0902    polyethylene glycol (MIRALAX) packet 17 g, 17 g, Oral, DAILY, Nam BOLDEN NP, 17 g at 05/10/19 0903    [facility] ANESTHESIA, , Other, PRN, Violetta Singh MD    [facility] EMERGENCY/STAT BOX, , Other, PRN, Violetta Singh MD    oxyCODONE-acetaminophen (PERCOCET) 5-325 mg per tablet 1 Tab, 1 Tab, Oral, Q4H PRN, Violetta Singh MD, 1 Tab at 05/08/19 0826    oxyCODONE-acetaminophen (PERCOCET 10)  mg per tablet 2 Tab, 2 Tab, Oral, Q4H PRN, Violetta Singh MD, 2 Tab at 05/10/19 1151    warfarin (COUMADIN) tablet 2 mg, 2 mg, Oral, QPM, Violetta Singh MD, 2 mg at 05/09/19 1756    cholecalciferol (VITAMIN D3) tablet 1,000 Units, 1,000 Units, Oral, DAILY, Latosha Xiao MD, 1,000 Units at 05/10/19 0902    lisinopril (PRINIVIL, ZESTRIL) tablet 10 mg, 10 mg, Oral, DAILY, Latosha Xiao MD, 10 mg at 05/10/19 0902    metFORMIN (GLUCOPHAGE) tablet 1,000 mg, 1,000 mg, Oral, BID WITH MEALS, Violetta Singh MD, 1,000 mg at 05/10/19 0902   pantoprazole (PROTONIX) tablet 40 mg, 40 mg, Oral, ACB, Latosha Xiao MD, 40 mg at 05/10/19 0730    insulin lispro (HUMALOG) injection, , SubCUTAneous, AC&HS, Harris Bridges MD, 4 Units at 05/10/19 1145    flaxseed oil cap 1,000 mg (Patient Supplied), 1,000 mg, Oral, DAILY, Harris Bridges MD, 1,000 mg at 04/30/19 0900    WARFARIN INFORMATION NOTE (COUMADIN), , Other, PRN, Harris Bridges MD    simvastatin (ZOCOR) tablet 20 mg, 20 mg, Oral, QHS, Latosha Xiao MD, 20 mg at 05/09/19 2123    magnesium hydroxide (MILK OF MAGNESIA) 400 mg/5 mL oral suspension 30 mL, 30 mL, Oral, DAILY PRN, Latosha Xiao MD, 30 mL at 04/29/19 2300    bisacodyl (DULCOLAX) suppository 10 mg, 10 mg, Rectal, DAILY PRN, Harris Bridges MD    Lab Results   Component Value Date/Time    Glucose 116 (H) 05/06/2019 05:05 AM    Glucose 212 (H) 04/29/2019 06:55 PM    Glucose 181 (H) 04/27/2019 04:10 AM    Glucose 95 04/26/2019 05:44 AM        Assessment/Plan   Closed right hip fracture: s/p right hip percutaneous pinning on 4/26. Staples out today. Orthopedic surgery in 1 month    Fall : fall intervention, continue PT/OT    Constipated: resolved, continue  colace + Miralax daily, and prn dulcolax suppository         Karis Nunez NP  5/10/2019, 1:23 PM

## 2019-05-10 NOTE — PROGRESS NOTES
Problem: Self Care Deficits Care Plan (Adult)  Goal: *Acute Goals and Plan of Care (Insert Text)  Description  -CLOF: OCCUPATIONAL THERAPY SHORT TERM GOALS      Updated 5/6/19    1. Patient will perform Upper body ADL's without adaptive equipment with modified independence. 2.  Patient will perform Lower body ADL's with adaptive equipment with moderate assistance. 3.  Patient will perform toileting task with moderate assistance  with Good safety to reduce falls risk. 4.  Patient will perform bedside commode transfers with Barragan Dew and minimal assistance while adhering to RLE TTWB. 5.  Patient will perform standing static/dynamic balance activities for improved ADL function with minimal assistance and Fair+ balance and safety awareness while adhering to RLE TTWB. 6.  Patient will improve Barthel index scores to atleast 92590 to improve functional mobility. 7.  Patient will utilize energy conservation techniques during functional activities with minimal verbal cues. Initiated 4/30/2019 and to be accomplished within 2 Week(s)    1. Patient will perform Upper body ADL's without adaptive equipment with modified independence. -CLOF: bathing and dressing w/ set up  2. Patient will perform Lower body ADL's with adaptive equipment with moderate assistance. -CLOF: bathing and dressing w/ Max A  3. Patient will perform toileting task with moderate assistance  with Good safety to reduce falls risk. -CLOF: Mod I w/ bladder mgmt using urinal. Dep bowel mgmt, Max A clothing mgmt  4. Patient will perform bedside commode transfers with Rolling Walker and minimal assistance while adhering to RLE TTWB.-CLOF: CGA w/ RW while adhering to RLE TTWB  5. Patient will perform standing static/dynamic balance activities for improved ADL function with minimal assistance and Fair+ balance and safety awareness while adhering to RLE TTWB.-CLOF: Karmen static and Fair dynamic w/ RW & CGA while adhering to RLE TTWB  6.   Patient will improve Barthel index scores to atleast 11013 to improve functional mobility. -CLOF: 55/100  7. Patient will utilize energy conservation techniques during functional activities with minimal verbal cues. -CLOF:  Max verbal cues    OCCUPATIONAL THERAPY LONG TERM GOALS   Initiated 4/30/2019 and to be accomplished within 4 Week(s)    1. Patient will perform ADL's with adaptive equipment as needed with modified independence. 2.  Patient will perform toileting task with modified independence with Good safety to reduce falls risk. 3.  Patient will perform all functional transfers utilizing least restrictive device and durable medical equipment as needed with modified independence and Good balance and safety awareness. 4.  Patient will perform standing static/dynamic activity for improved ADL function with modified independence and Good balance and safety awareness. 5.  Patient will improve Barthel index score to 95/100 to improve independence with mobility. 6. Patient will perform UE HEP with Modified [place] to increase BUE strength for continued participation in ADLs. Therapist: Isaac Shaikh    4/30/2019               Note:   [facility]   OCCUPATIONAL THERAPY DAILY TREATMENT NOTE      Patient: Milton English (37 y.o. male)       Date: 5/10/2019  Attending Physician: Leo Nieves MD  Primary Diagnosis: rt hip fracture    Treatment Diagnosis  Treatment Diagnosis: need for assist with self care  Treatment Diagnosis 2: muscle weakness   Precautions : Precautions at Admission: Fall, TTWB(TTWB RLE)  Vital Signs:  Vital Signs  Pulse (Heart Rate): 61  Resp Rate: 16  O2 Sat (%): 94 %  BP: 115/66  MAP (Calculated): 82     Cognitive Status:  Mental Status  Neurologic State: Alert  Orientation Level: Oriented to person;Oriented to place;Oriented to situation  Cognition: Follows commands; Impulsive    Bed Mobility:  Bed Mobility  Supine to Sit: Moderate assistance  Scooting: Minimum assistance  Transfers:  Functional Transfers  Sit to Stand: Minimum assistance; Moderate assistance  Stand to Sit: Contact guard assistance  Bed to Chair: Contact guard assistance     Balance:  Balance  Sitting: Without support  Sitting - Static: Fair (occasional)(+)  Sitting - Dynamic: Fair (occasional)  Standing: With support  Standing - Static: Fair  Standing - Dynamic : Fair(-)  ADL Self Care:  Basic ADL  Oral Facial Hygiene/Grooming: Setup  Upper Body Dressing: Stand-by assistance(donning pullover shirt)  Lower Body Dressing: Maximum assistance(2/2 R LE pain)  Toileting: Moderate assistance(@ stand with RW utilizing urinal )    Therapeutic Activities:  Pt presented supine in bed upon therapist arrival reporting he had to urinate. Pt required MOD A supine-sit and MIN A to scoot EOB. MAX A LB dressing sitting EOB donning briefs and pants 2/2 increased R LE pain, UB dressing SBA donning pullover shirt. Pt stood ~ 2.5 mins to complete toileting requiring with RW and urinal MOD A, pt required mod cueing to adhere to TTWB on R LE. Fnxl w/c mobility in room and bathroom with B UE's ~ 15 ft to increase strength and endurance needed for ADLs. Pt performed oral care and grooming task S/U @ w/c level. Patient/Caregiver Education:    Pt educated on importance of adhering to TTWB on R LE to achieve optimal fnxl gains. ASSESSMENT:  Patient continues to demonstrate the need for skilled Occupational Therapy services to improve strength, balance, and endurance. Progression toward goals:  ?      Improving appropriately and progressing toward goals  ? Improving slowly and progressing toward goals  ?       Not making progress toward goals and plan of care will be adjusted     Treatment session:   52 minutes    Therapist:    KAIDEN Urena,  5/10/2019

## 2019-05-11 LAB
GLUCOSE BLD STRIP.AUTO-MCNC: 142 MG/DL (ref 70–110)
GLUCOSE BLD STRIP.AUTO-MCNC: 162 MG/DL (ref 70–110)
GLUCOSE BLD STRIP.AUTO-MCNC: 163 MG/DL (ref 70–110)
GLUCOSE BLD STRIP.AUTO-MCNC: 177 MG/DL (ref 70–110)

## 2019-05-11 PROCEDURE — 74011250637 HC RX REV CODE- 250/637: Performed by: INTERNAL MEDICINE

## 2019-05-11 PROCEDURE — 74011250637 HC RX REV CODE- 250/637: Performed by: NURSE PRACTITIONER

## 2019-05-11 PROCEDURE — 74011636637 HC RX REV CODE- 636/637: Performed by: INTERNAL MEDICINE

## 2019-05-11 PROCEDURE — 82962 GLUCOSE BLOOD TEST: CPT

## 2019-05-11 RX ADMIN — OXYCODONE HYDROCHLORIDE AND ACETAMINOPHEN 2 TABLET: 10; 325 TABLET ORAL at 22:31

## 2019-05-11 RX ADMIN — OXYCODONE HYDROCHLORIDE AND ACETAMINOPHEN 1 TABLET: 5; 325 TABLET ORAL at 17:08

## 2019-05-11 RX ADMIN — METFORMIN HYDROCHLORIDE 1000 MG: 500 TABLET ORAL at 17:08

## 2019-05-11 RX ADMIN — DOCUSATE SODIUM 100 MG: 100 CAPSULE, LIQUID FILLED ORAL at 09:04

## 2019-05-11 RX ADMIN — METFORMIN HYDROCHLORIDE 1000 MG: 500 TABLET ORAL at 09:04

## 2019-05-11 RX ADMIN — INSULIN LISPRO 2 UNITS: 100 INJECTION, SOLUTION INTRAVENOUS; SUBCUTANEOUS at 17:08

## 2019-05-11 RX ADMIN — INSULIN LISPRO 2 UNITS: 100 INJECTION, SOLUTION INTRAVENOUS; SUBCUTANEOUS at 22:35

## 2019-05-11 RX ADMIN — PANTOPRAZOLE SODIUM 40 MG: 40 TABLET, DELAYED RELEASE ORAL at 09:04

## 2019-05-11 RX ADMIN — OXYCODONE HYDROCHLORIDE AND ACETAMINOPHEN 1 TABLET: 5; 325 TABLET ORAL at 09:10

## 2019-05-11 RX ADMIN — INSULIN LISPRO 2 UNITS: 100 INJECTION, SOLUTION INTRAVENOUS; SUBCUTANEOUS at 12:27

## 2019-05-11 RX ADMIN — WARFARIN SODIUM 2 MG: 2 TABLET ORAL at 17:08

## 2019-05-11 RX ADMIN — CHOLECALCIFEROL (VITAMIN D3) 25 MCG (1,000 UNIT) TABLET 1000 UNITS: at 09:04

## 2019-05-11 NOTE — ROUTINE PROCESS
Patient found out of bed in bathroom, teaching touch touch weight bearing only to leg. Patient teaching given.  Assisted to wheelchair

## 2019-05-11 NOTE — PROGRESS NOTES
Problem: Self Care Deficits Care Plan (Adult)  Goal: *Acute Goals and Plan of Care (Insert Text)  Description  -CLOF: OCCUPATIONAL THERAPY SHORT TERM GOALS      Updated 5/6/19    1. Patient will perform Upper body ADL's without adaptive equipment with modified independence. 2.  Patient will perform Lower body ADL's with adaptive equipment with moderate assistance. 3.  Patient will perform toileting task with moderate assistance  with Good safety to reduce falls risk. 4.  Patient will perform bedside commode transfers with [place] Roll and minimal assistance while adhering to RLE TTWB. 5.  Patient will perform standing static/dynamic balance activities for improved ADL function with minimal assistance and Fair+ balance and safety awareness while adhering to RLE TTWB. 6.  Patient will improve Barthel index scores to atleast 79225 to improve functional mobility. 7.  Patient will utilize energy conservation techniques during functional activities with minimal verbal cues. Initiated 4/30/2019 and to be accomplished within 2 Week(s)    1. Patient will perform Upper body ADL's without adaptive equipment with modified independence. -CLOF: bathing and dressing w/ set up  2. Patient will perform Lower body ADL's with adaptive equipment with moderate assistance. -CLOF: bathing and dressing w/ Max A  3. Patient will perform toileting task with moderate assistance  with Good safety to reduce falls risk. -CLOF: Mod I w/ bladder mgmt using urinal. Dep bowel mgmt, Max A clothing mgmt  4. Patient will perform bedside commode transfers with Rolling Walker and minimal assistance while adhering to RLE TTWB.-CLOF: CGA w/ RW while adhering to RLE TTWB  5. Patient will perform standing static/dynamic balance activities for improved ADL function with minimal assistance and Fair+ balance and safety awareness while adhering to RLE TTWB.-CLOF: Karmen static and Fair dynamic w/ RW & CGA while adhering to RLE TTWB  6.   Patient will improve Barthel index scores to atleast 77634 to improve functional mobility. -CLOF: 55/100  7. Patient will utilize energy conservation techniques during functional activities with minimal verbal cues. -CLOF:  Max verbal cues    OCCUPATIONAL THERAPY LONG TERM GOALS   Initiated 4/30/2019 and to be accomplished within 4 Week(s)    1. Patient will perform ADL's with adaptive equipment as needed with modified independence. 2.  Patient will perform toileting task with modified independence with Good safety to reduce falls risk. 3.  Patient will perform all functional transfers utilizing least restrictive device and durable medical equipment as needed with modified independence and Good balance and safety awareness. 4.  Patient will perform standing static/dynamic activity for improved ADL function with modified independence and Good balance and safety awareness. 5.  Patient will improve Barthel index score to 95/100 to improve independence with mobility. 6. Patient will perform UE HEP with Modified Golden to increase BUE strength for continued participation in ADLs.     Therapist: Amirah Shaikh    4/30/2019               Note:   [facility]   OCCUPATIONAL THERAPY DAILY TREATMENT NOTE      Patient: Steff Fitch (47 y.o. male)       Date: 5/11/2019  Attending Physician: Mili Hernadez MD  Primary Diagnosis: rt hip fracture    Treatment Diagnosis  Treatment Diagnosis: need for assist with self care  Treatment Diagnosis 2: muscle weakness   Precautions : Precautions at Admission: Fall, TTWB(TTWB RLE)  Vital Signs:  Vital Signs  Temp: 96.7 °F (35.9 °C)  Temp Source: Oral  Pulse (Heart Rate): 67  Cardiac Rhythm: Atrial fibrillation  Resp Rate: 18  O2 Sat (%): 94 %  BP: 110/62  MAP (Calculated): 78     Cognitive Status:  Mental Status  Neurologic State: Alert;Confused  Orientation Level: Oriented to person;Oriented to place  Cognition: Decreased attention/concentration; Impulsive; Impaired decision making;Poor safety awareness  Pain:  Pain Screen  Pain Scale 1: Visual  Pain Intensity 1: 0  Patient Stated Pain Goal: 0  Pain Reassessment 1: Patient sleeping  Pain Scale 1: Visual  Gross Assessment:     Coordination:     Bed Mobility:  Bed Mobility  Supine to Sit: Moderate assistance  Scooting: Minimum assistance; Additional time  Transfers:  Functional Transfers  Sit to Stand: Moderate assistance  Stand to Sit: Contact guard assistance  Bed to Chair: Contact guard assistance; Additional time     Balance:  Balance  Sitting: With support  Sitting - Static: Fair (occasional)  Sitting - Dynamic: Fair (occasional)  Standing: With support  Standing - Static: Fair  Standing - Dynamic : Fair(-)    Therapeutic Activities:  Pt presented supine in bed upon therapist arrival and agreeable for OOB activity at this time. Pt demo fnxl bed mobility MOD A supine-sit and MIN A scooting EOB requiring increased time 2/2 R LE soreness. STS transfer MOD A x 3 trials with RW w/ mod cueing to maintain TTWB on R LE to increase safety and reduce fall risk. CGA stand to step transfer with RW to w/c. FM task with B UE's to increase strength, coordination, and finger dexterity to improve overall fnxl performance skills. Pt fatigued easily this date requiring frequent rest breaks with all tasks. Patient/Caregiver Education:    Pt educated on fall prevention and safety to reduce fall risk. ASSESSMENT:  Patient continues to demonstrate the need for skilled Occupational Therapy services to improve strength, balance, and endurance. Progression toward goals:  ?      Improving appropriately and progressing toward goals  ? Improving slowly and progressing toward goals  ?       Not making progress toward goals and plan of care will be adjusted     Treatment session:   48 minutes    Therapist:    KAIDEN Herrera,  5/11/2019

## 2019-05-11 NOTE — ROUTINE PROCESS
Bedside and Verbal shift change report given to SIDNEY Mckeon (oncoming nurse) by adeel lucas rn (offgoing nurse). Report included the following information Kardex, MAR and Recent Results.

## 2019-05-11 NOTE — ROUTINE PROCESS
Bedside and Verbal shift change report given to JOAQUIN Carcamo RN (oncoming nurse) by Nahun Jeffery RN (offgoing nurse). Report included the following information SBAR, Kardex and Med Rec Status. Qh visual checks and 24h chart checks done.

## 2019-05-12 LAB
GLUCOSE BLD STRIP.AUTO-MCNC: 114 MG/DL (ref 70–110)
GLUCOSE BLD STRIP.AUTO-MCNC: 141 MG/DL (ref 70–110)
GLUCOSE BLD STRIP.AUTO-MCNC: 148 MG/DL (ref 70–110)
GLUCOSE BLD STRIP.AUTO-MCNC: 171 MG/DL (ref 70–110)
GLUCOSE BLD STRIP.AUTO-MCNC: 181 MG/DL (ref 70–110)

## 2019-05-12 PROCEDURE — 74011250637 HC RX REV CODE- 250/637: Performed by: INTERNAL MEDICINE

## 2019-05-12 PROCEDURE — 74011250637 HC RX REV CODE- 250/637: Performed by: NURSE PRACTITIONER

## 2019-05-12 PROCEDURE — 74011636637 HC RX REV CODE- 636/637: Performed by: INTERNAL MEDICINE

## 2019-05-12 PROCEDURE — 82962 GLUCOSE BLOOD TEST: CPT

## 2019-05-12 RX ADMIN — METFORMIN HYDROCHLORIDE 1000 MG: 500 TABLET ORAL at 18:29

## 2019-05-12 RX ADMIN — OXYCODONE HYDROCHLORIDE AND ACETAMINOPHEN 1 TABLET: 5; 325 TABLET ORAL at 04:36

## 2019-05-12 RX ADMIN — INSULIN LISPRO 2 UNITS: 100 INJECTION, SOLUTION INTRAVENOUS; SUBCUTANEOUS at 13:24

## 2019-05-12 RX ADMIN — OXYCODONE HYDROCHLORIDE AND ACETAMINOPHEN 2 TABLET: 10; 325 TABLET ORAL at 13:18

## 2019-05-12 RX ADMIN — METFORMIN HYDROCHLORIDE 1000 MG: 500 TABLET ORAL at 08:54

## 2019-05-12 RX ADMIN — CHOLECALCIFEROL (VITAMIN D3) 25 MCG (1,000 UNIT) TABLET 1000 UNITS: at 08:54

## 2019-05-12 RX ADMIN — DOCUSATE SODIUM 100 MG: 100 CAPSULE, LIQUID FILLED ORAL at 08:54

## 2019-05-12 RX ADMIN — OXYCODONE HYDROCHLORIDE AND ACETAMINOPHEN 1 TABLET: 5; 325 TABLET ORAL at 18:32

## 2019-05-12 RX ADMIN — WARFARIN SODIUM 2 MG: 2 TABLET ORAL at 18:29

## 2019-05-12 RX ADMIN — MAGNESIUM HYDROXIDE 30 ML: 400 SUSPENSION ORAL at 08:53

## 2019-05-12 RX ADMIN — LISINOPRIL 10 MG: 10 TABLET ORAL at 08:54

## 2019-05-12 RX ADMIN — PANTOPRAZOLE SODIUM 40 MG: 40 TABLET, DELAYED RELEASE ORAL at 08:54

## 2019-05-12 RX ADMIN — OXYCODONE HYDROCHLORIDE AND ACETAMINOPHEN 1 TABLET: 5; 325 TABLET ORAL at 08:54

## 2019-05-12 RX ADMIN — POLYETHYLENE GLYCOL 3350 17 G: 17 POWDER, FOR SOLUTION ORAL at 08:54

## 2019-05-12 RX ADMIN — INSULIN LISPRO 2 UNITS: 100 INJECTION, SOLUTION INTRAVENOUS; SUBCUTANEOUS at 18:29

## 2019-05-12 RX ADMIN — MAGNESIUM HYDROXIDE 30 ML: 400 SUSPENSION ORAL at 13:18

## 2019-05-12 RX ADMIN — SIMVASTATIN 20 MG: 20 TABLET, FILM COATED ORAL at 21:46

## 2019-05-12 NOTE — PROGRESS NOTES
Bedside shift change report given to Guerrero Mcwilliams RN (oncoming nurse) by Monik Schultz LPN (offgoing nurse). Report included the following information SBAR, Kardex, Procedure Summary, Intake/Output, MAR and Quality Measures.

## 2019-05-12 NOTE — PROGRESS NOTES
Problem: Mobility Impaired (Adult and Pediatric)  Goal: *Acute Goals and Plan of Care (Insert Text)  Description  ))PHYSICAL THERAPY STG GOALS :  //Initiated 4/30/2019 and to be accomplished within 2 Weeks (Updated 5/7/19)    1. Patient will move from supine to sit and sit to supine  and roll side to side in bed with stand by assistance. (Progressing; Min-Mod A)    2. Patient will transfer from bed to chair and chair to bed with contact guard assist using RW with WB compliance. (Achieved)  3. Patient will perform sit to stand with contact guard assist with Fair+ balance and safety awareness with WB compliance. (Progressing; CGA-Min A)   4. Patient will ambulate with contact guard assist for 75 feet with RW on level surfaces with 2 turns with WB compliance. (Progressing; 100ft with RW and CGA, cues for TTWB and no 1 turn)   5. Patient will ascend/descend 1 step with bilateral handrail(s) with minimal assistance to allow for safe home access/exit with WB compliance. (Not assessed due to increased difficulty with TTWB during ambulation)  6. Patient will improve standardized test score for [place] Standing Balance Scale 3/5 to reduce fall risk. (Progressing; 2+/5)    PHYSICAL THERAPY STG GOALS :  Initiated 4/30/2019 and to be accomplished within 2 Weeks    1. Patient will move from supine to sit and sit to supine  and roll side to side in bed with stand by assistance. 2.  Patient will transfer from bed to chair and chair to bed with contact guard assist using RW with WB compliance. 3.  Patient will perform sit to stand with contact guard assist with Fair+ balance and safety awareness with WB compliance. 4.  Patient will ambulate with contact guard assist for 75 feet with RW on level surfaces with 2 turns with WB compliance. 5.  Patient will ascend/descend 1 step with bilateral handrail(s) with minimal assistance to allow for safe home access/exit with WB compliance.   6.  Patient will improve standardized test score for [place] Standing Balance Scale 3/5 to reduce fall risk. PHYSICAL THERAPY LTG GOALS :  Initiated 4/30/2019 and to be accomplished within 4 Weeks    1. Patient will move from supine to sit and sit to supine  and roll side to side in bed with modified independence. 2.  Patient will transfer from bed to chair and chair to bed with supervision/set-up using RW with WB compliance. 3.  Patient will perform sit to stand with supervision/set-up with Good balance and safety awareness. 4.  Patient will ambulate with supervision/set-up for 200 feet with RW on level surfaces and be able to maneuver through narrow spaces and obstacles without loss of balance with WB compliance. 5.  Patient will ascend/descend 3 stairs with NO handrail(s) with stand by assistance to allow for safe home access/exit. 6.  Patient will improve standardized test score for [place] Standing Balance Scale 4/5 to reduce fall risk. Physical Therapist:   Moisés Pruitt PT  on 4/30/2019             Note:   [facility]   PHYSICAL THERAPY DAILY TREATMENT NOTE      Patient: Mert William (31 y.o. male)               Date: 5/12/2019    Physician: Zia Medina MD  Primary Diagnosis: rt hip fracture          Treatment Diagnosis  Treatment Diagnosis: need for assist with self care  Treatment Diagnosis 2: muscle weakness  Precautions: Fall, TTWB(TTWB RLE)  Vital Signs  Vital Signs  Temp: 97 °F (36.1 °C)  Temp Source: Oral  Pulse (Heart Rate): 70  Resp Rate: 18  O2 Sat (%): 92 %  BP: 111/61  MAP (Calculated): 78  Cognitive Status:  Pain  Bed Mobility Training  Balance  Sitting: With support  Sitting - Static: Fair (occasional)  Sitting - Dynamic: Fair (occasional)  Standing: (Deferred due to increase c/o L side back soreness s/p fall)  Transfer Training  Treatment:   Per Nsg, pt had a fall this am and landed on his left elbow/back along L side with visible abrasion.  Pt was found sitting at EOB with wife present at bedside and reported increase c/o back soreness along L side. Pt given pain meds by NSG prior to start of treatment. Pt tolerated seated TE AROM BLE's: ankle pumps, LAQ's, hip marches, and orange TB (hip abd, hamstring curls) 2x15 reps  with rest breaks to improve LE ROM and strength. Standing and gait deferred today due to pt's complaints of pain/soreness. Patient/Caregiver Education:   Pt /Caregiver Education on benefits of exercise and importance of utilizing call bell at all times for OOB activities to maximize safety and prevent future falls during rehab stay. Pt will require reinforcement for compliance secondary to cognitive limitations. ASSESSMENT:  Patient continues to benefit from Skilled PT services to improve strength, endurance, balance, and functional mobility while maintaining WB precautions. Progression toward goals:  ?      Improving appropriately and progressing toward goals  ? Improving slowly and progressing toward goals  ? Not making progress toward goals and plan of care will be adjusted     Treatment session: 38 minutes.   Therapist:   Georgina White, GAGE,          5/12/2019

## 2019-05-12 NOTE — PROGRESS NOTES
Bedside shift change report given to Juan Dinh (oncoming nurse) by Mauricio Huddleston LPN (offgoing nurse). Report included the following information SBAR, Kardex, Intake/Output, MAR, Recent Results, Alarm Parameters  and Quality Measures.

## 2019-05-12 NOTE — ROUTINE PROCESS
Bedside, Verbal and Written shift change report given to [facility] lpn (oncoming nurse) by Martha Fothergill (offgoing nurse). Report included the following information SBAR, Intake/Output and MAR.

## 2019-05-12 NOTE — PROGRESS NOTES
Pt found in room on floor face up with a contusion to his left elbow, after heard a loud thud come from his room. Zina Early was next to me when we both heard the noise and responded together. I assessed the patient on the ground and her verbalized to me that he was okay. We used two person assist to ambulate the patient into a wheelchair where I performed wound care to his elbow and assessed him further where I found a bruise to his left side and. Patient was moved in to the activity area within site of staff. Doctor, DON , and family was notitified.

## 2019-05-13 LAB
ANION GAP SERPL CALC-SCNC: 5 MMOL/L (ref 3–18)
BASOPHILS # BLD: 0.1 K/UL (ref 0–0.1)
BASOPHILS NFR BLD: 1 % (ref 0–2)
BUN SERPL-MCNC: 27 MG/DL (ref 7–18)
BUN/CREAT SERPL: 27 (ref 12–20)
CALCIUM SERPL-MCNC: 8.5 MG/DL (ref 8.5–10.1)
CHLORIDE SERPL-SCNC: 103 MMOL/L (ref 100–108)
CO2 SERPL-SCNC: 26 MMOL/L (ref 21–32)
CREAT SERPL-MCNC: 1 MG/DL (ref 0.6–1.3)
DIFFERENTIAL METHOD BLD: ABNORMAL
EOSINOPHIL # BLD: 0.5 K/UL (ref 0–0.4)
EOSINOPHIL NFR BLD: 7 % (ref 0–5)
ERYTHROCYTE [DISTWIDTH] IN BLOOD BY AUTOMATED COUNT: 14.8 % (ref 11.6–14.5)
GLUCOSE BLD STRIP.AUTO-MCNC: 144 MG/DL (ref 70–110)
GLUCOSE BLD STRIP.AUTO-MCNC: 157 MG/DL (ref 70–110)
GLUCOSE BLD STRIP.AUTO-MCNC: 177 MG/DL (ref 70–110)
GLUCOSE BLD STRIP.AUTO-MCNC: 207 MG/DL (ref 70–110)
GLUCOSE BLD STRIP.AUTO-MCNC: 231 MG/DL (ref 70–110)
GLUCOSE SERPL-MCNC: 131 MG/DL (ref 74–99)
HCT VFR BLD AUTO: 37.1 % (ref 36–48)
HGB BLD-MCNC: 12 G/DL (ref 13–16)
INR PPP: 2.7 (ref 0.8–1.2)
LYMPHOCYTES # BLD: 1.5 K/UL (ref 0.9–3.6)
LYMPHOCYTES NFR BLD: 20 % (ref 21–52)
MCH RBC QN AUTO: 27.8 PG (ref 24–34)
MCHC RBC AUTO-ENTMCNC: 32.3 G/DL (ref 31–37)
MCV RBC AUTO: 86.1 FL (ref 74–97)
MONOCYTES # BLD: 0.9 K/UL (ref 0.05–1.2)
MONOCYTES NFR BLD: 11 % (ref 3–10)
NEUTS SEG # BLD: 4.7 K/UL (ref 1.8–8)
NEUTS SEG NFR BLD: 61 % (ref 40–73)
PLATELET # BLD AUTO: 465 K/UL (ref 135–420)
PMV BLD AUTO: 10.1 FL (ref 9.2–11.8)
POTASSIUM SERPL-SCNC: 5.4 MMOL/L (ref 3.5–5.5)
PROTHROMBIN TIME: 28.7 SEC (ref 11.5–15.2)
RBC # BLD AUTO: 4.31 M/UL (ref 4.7–5.5)
SODIUM SERPL-SCNC: 134 MMOL/L (ref 136–145)
WBC # BLD AUTO: 7.6 K/UL (ref 4.6–13.2)

## 2019-05-13 PROCEDURE — 80048 BASIC METABOLIC PNL TOTAL CA: CPT

## 2019-05-13 PROCEDURE — 82962 GLUCOSE BLOOD TEST: CPT

## 2019-05-13 PROCEDURE — 85025 COMPLETE CBC W/AUTO DIFF WBC: CPT

## 2019-05-13 PROCEDURE — 74011636637 HC RX REV CODE- 636/637: Performed by: INTERNAL MEDICINE

## 2019-05-13 PROCEDURE — 36415 COLL VENOUS BLD VENIPUNCTURE: CPT

## 2019-05-13 PROCEDURE — 85610 PROTHROMBIN TIME: CPT

## 2019-05-13 PROCEDURE — 74011250637 HC RX REV CODE- 250/637: Performed by: INTERNAL MEDICINE

## 2019-05-13 PROCEDURE — 74011250637 HC RX REV CODE- 250/637: Performed by: NURSE PRACTITIONER

## 2019-05-13 RX ADMIN — INSULIN LISPRO 4 UNITS: 100 INJECTION, SOLUTION INTRAVENOUS; SUBCUTANEOUS at 12:07

## 2019-05-13 RX ADMIN — PANTOPRAZOLE SODIUM 40 MG: 40 TABLET, DELAYED RELEASE ORAL at 09:37

## 2019-05-13 RX ADMIN — INSULIN LISPRO 2 UNITS: 100 INJECTION, SOLUTION INTRAVENOUS; SUBCUTANEOUS at 21:56

## 2019-05-13 RX ADMIN — WARFARIN SODIUM 2 MG: 2 TABLET ORAL at 17:45

## 2019-05-13 RX ADMIN — METFORMIN HYDROCHLORIDE 1000 MG: 500 TABLET ORAL at 09:37

## 2019-05-13 RX ADMIN — INSULIN LISPRO 2 UNITS: 100 INJECTION, SOLUTION INTRAVENOUS; SUBCUTANEOUS at 16:40

## 2019-05-13 RX ADMIN — LISINOPRIL 10 MG: 10 TABLET ORAL at 09:37

## 2019-05-13 RX ADMIN — METFORMIN HYDROCHLORIDE 1000 MG: 500 TABLET ORAL at 17:45

## 2019-05-13 RX ADMIN — POLYETHYLENE GLYCOL 3350 17 G: 17 POWDER, FOR SOLUTION ORAL at 09:39

## 2019-05-13 RX ADMIN — CHOLECALCIFEROL (VITAMIN D3) 25 MCG (1,000 UNIT) TABLET 1000 UNITS: at 09:37

## 2019-05-13 RX ADMIN — DOCUSATE SODIUM 100 MG: 100 CAPSULE, LIQUID FILLED ORAL at 09:37

## 2019-05-13 RX ADMIN — OXYCODONE HYDROCHLORIDE AND ACETAMINOPHEN 1 TABLET: 5; 325 TABLET ORAL at 04:38

## 2019-05-13 RX ADMIN — OXYCODONE HYDROCHLORIDE AND ACETAMINOPHEN 2 TABLET: 10; 325 TABLET ORAL at 17:45

## 2019-05-13 RX ADMIN — SIMVASTATIN 20 MG: 20 TABLET, FILM COATED ORAL at 21:03

## 2019-05-13 NOTE — ROUTINE PROCESS
Sat up in wheelchair most of shift, sat IN [facility] Room listening to song marion.Re educated on importance of calling for help out of bed and to the bathroom, He verbally stated I understand.

## 2019-05-13 NOTE — ROUTINE PROCESS
Bedside and Verbal shift change report given to [address] [place] [address] [id number] rn (oncoming nurse) by yumiko rn (offgoing nurse). Report included the following information Kardex, MAR and Recent Results.

## 2019-05-13 NOTE — PROGRESS NOTES
[facility]    Daily progress Note    Patient: Ivette Abarca MRN: [id number]  CSN: [id number]    YOB: 1931  Age: 80 y.o. Sex: male    DOA: 4/29/2019 LOS:  LOS: 14 days                    Subjective:     CC: follow up fall  Nursing report 2nd unwitnessed fall, recent on 5/11. Patient is currently sitting up in w/c having breakfast, he is alert and verbal. He reported that he slid out of chair on Saturday, only had left elbow abrasion, he denies any pain to elbow, able to do ROM to LUE. VSS stable, labs reviewed mild hyponatremia. Discussed with patient to increase po fluid intake. Patient is on on Coumadin with PT/INR monitoring. PAST MEDICAL HX: Alzheimer's Disease, HTN AFIB, DM type 2      PAST SURGICAL Hx: hernia repair     SOCIAL Hx:      ALLERGIES: NKDA     ROSCONST: Fever, weight loss, fatigue or chills  HEENT: Recent changes in vision, vertigo  CV: Chest pain, palpitations, edema and varicosities  RESP: Cough, shortness of breath, wheezing  GI: Nausea, vomiting, abdominal pain, change in bowel habits,  : Hematuria, dysuria, frequency  MS: Weakness, right hip sorness  LYMPH/HEME: Anemia, bruising and history of blood transfusions  INTEG: Dermatitis, abnormal moles  NEURO: Dizziness, headache, fainting, seizures and stroke  PSYCH: Anxiety and depression         Objective:      Visit Vitals  /61   Pulse 74   Temp 97.5 °F (36.4 °C)   Resp 18   Ht 5' 9\" (1.753 m)   Wt 61.8 kg (136 lb 3.2 oz)   SpO2 95%   BMI 20.11 kg/m²       Physical Exam:  General appearance: alert, cooperative, no distress, appears stated age  HEENT: negative  Neck: supple, symmetrical, trachea midline, no adenopathy, thyroid: not enlarged, symmetric, no tenderness/mass/nodules, no carotid bruit and no JVD  Lungs: clear to auscultation bilaterally  Heart: regular rate and rhythm, S1, S2 normal, no murmur, click, rub or gallop  Abdomen: soft, non-tender.  Bowel sounds normal. No masses,  no organomegaly  Pulses: 2+ and symmetric  Skin: abrasion to left elbow    Intake and Output:  Current Shift:  No intake/output data recorded. Last three shifts:  No intake/output data recorded. Recent Results (from the past 24 hour(s))   GLUCOSE, POC    Collection Time: 05/12/19  9:05 AM   Result Value Ref Range    Glucose (POC) 148 (H) 70 - 110 mg/dL   GLUCOSE, POC    Collection Time: 05/12/19  1:22 PM   Result Value Ref Range    Glucose (POC) 181 (H) 70 - 110 mg/dL   GLUCOSE, POC    Collection Time: 05/12/19  4:53 PM   Result Value Ref Range    Glucose (POC) 171 (H) 70 - 110 mg/dL   GLUCOSE, POC    Collection Time: 05/12/19  9:45 PM   Result Value Ref Range    Glucose (POC) 114 (H) 70 - 110 mg/dL   CBC WITH AUTOMATED DIFF    Collection Time: 05/13/19  4:20 AM   Result Value Ref Range    WBC 7.6 4.6 - 13.2 K/uL    RBC 4.31 (L) 4.70 - 5.50 M/uL    HGB 12.0 (L) 13.0 - 16.0 g/dL    HCT 37.1 36.0 - 48.0 %    MCV 86.1 74.0 - 97.0 FL    MCH 27.8 24.0 - 34.0 PG    MCHC 32.3 31.0 - 37.0 g/dL    RDW 14.8 (H) 11.6 - 14.5 %    PLATELET 088 (H) 362 - 420 K/uL    MPV 10.1 9.2 - 11.8 FL    NEUTROPHILS 61 40 - 73 %    LYMPHOCYTES 20 (L) 21 - 52 %    MONOCYTES 11 (H) 3 - 10 %    EOSINOPHILS 7 (H) 0 - 5 %    BASOPHILS 1 0 - 2 %    ABS. NEUTROPHILS 4.7 1.8 - 8.0 K/UL    ABS. LYMPHOCYTES 1.5 0.9 - 3.6 K/UL    ABS. MONOCYTES 0.9 0.05 - 1.2 K/UL    ABS. EOSINOPHILS 0.5 (H) 0.0 - 0.4 K/UL    ABS.  BASOPHILS 0.1 0.0 - 0.1 K/UL    DF AUTOMATED     PROTHROMBIN TIME + INR    Collection Time: 05/13/19  4:20 AM   Result Value Ref Range    Prothrombin time 28.7 (H) 11.5 - 15.2 sec    INR 2.7 (H) 0.8 - 1.2     METABOLIC PANEL, BASIC    Collection Time: 05/13/19  4:20 AM   Result Value Ref Range    Sodium 134 (L) 136 - 145 mmol/L    Potassium 5.4 3.5 - 5.5 mmol/L    Chloride 103 100 - 108 mmol/L    CO2 26 21 - 32 mmol/L    Anion gap 5 3.0 - 18 mmol/L    Glucose 131 (H) 74 - 99 mg/dL    BUN 27 (H) 7.0 - 18 MG/DL    Creatinine 1.00 0.6 - 1.3 MG/DL    BUN/Creatinine ratio 27 (H) 12 - 20      GFR est AA >60 >60 ml/min/1.73m2    GFR est non-AA >60 >60 ml/min/1.73m2    Calcium 8.5 8.5 - 10.1 MG/DL   GLUCOSE, POC    Collection Time: 05/13/19  4:40 AM   Result Value Ref Range    Glucose (POC) 144 (H) 70 - 110 mg/dL         Current Facility-Administered Medications:     docusate sodium (COLACE) capsule 100 mg, 100 mg, Oral, DAILY, Nelly Hansen NP, 100 mg at 05/12/19 0854    polyethylene glycol (MIRALAX) packet 17 g, 17 g, Oral, DAILY, Bela BOLDEN NP, 17 g at 05/12/19 0854    [facility] ANESTHESIA, , Other, PRN, Octavio Bishop MD    [facility] EMERGENCY/STAT BOX, , Other, PRN, Octavio Bishop MD    oxyCODONE-acetaminophen (PERCOCET) 5-325 mg per tablet 1 Tab, 1 Tab, Oral, Q4H PRN, Octavio Bishop MD, 1 Tab at 05/13/19 0438    oxyCODONE-acetaminophen (PERCOCET 10)  mg per tablet 2 Tab, 2 Tab, Oral, Q4H PRN, Octavio Bishop MD, 2 Tab at 05/12/19 1318    warfarin (COUMADIN) tablet 2 mg, 2 mg, Oral, QPM, Octavio Bishop MD, 2 mg at 05/12/19 1829    cholecalciferol (VITAMIN D3) tablet 1,000 Units, 1,000 Units, Oral, DAILY, Latosha Xiao MD, 1,000 Units at 05/12/19 0854    lisinopril (PRINIVIL, ZESTRIL) tablet 10 mg, 10 mg, Oral, DAILY, Octavio Bishop MD, 10 mg at 05/12/19 0854    metFORMIN (GLUCOPHAGE) tablet 1,000 mg, 1,000 mg, Oral, BID WITH MEALS, Octavio Bishop MD, 1,000 mg at 05/12/19 1829    pantoprazole (PROTONIX) tablet 40 mg, 40 mg, Oral, ACB, Latosha Xiao MD, 40 mg at 05/12/19 0854    insulin lispro (HUMALOG) injection, , SubCUTAneous, AC&HS, Octavio Bisohp MD, Stopped at 05/12/19 2200    flaxseed oil cap 1,000 mg (Patient Supplied), 1,000 mg, Oral, DAILY, Octavio Bishop MD, Stopped at 05/12/19 0900    WARFARIN INFORMATION NOTE (COUMADIN), , Other, PRN, Octavio Bishop MD    simvastatin (ZOCOR) tablet 20 mg, 20 mg, Oral, QHS, Latosha Xiao MD, 20 mg at 05/12/19 0939   magnesium hydroxide (MILK OF MAGNESIA) 400 mg/5 mL oral suspension 30 mL, 30 mL, Oral, DAILY PRN, Latosha Xiao MD, 30 mL at 05/12/19 1318    bisacodyl (DULCOLAX) suppository 10 mg, 10 mg, Rectal, DAILY PRN, Nkechi Auguste MD    Lab Results   Component Value Date/Time    Glucose 131 (H) 05/13/2019 04:20 AM    Glucose 116 (H) 05/06/2019 05:05 AM    Glucose 212 (H) 04/29/2019 06:55 PM    Glucose 181 (H) 04/27/2019 04:10 AM    Glucose 95 04/26/2019 05:44 AM        Assessment/Plan     Closed right hip fracture: s/p right hip percutaneous pinning on 4/26. Staples removed. Orthopedic surgery in 1 month     Constipated: resolved     AFIB: continue Coumadin with PT/INR monitoring  Monday and Thursday. Continue current dose of coumadin INR 2.7  today    DM type 2 , hgab1c 6.8 on 4/26, will continue metformin 1000 mg bid + Amaryl 4 mg, will monitor for hypoglycemia episodes. Renal function stable. May hold Amaryl if hypoglycemic episodes      HTN; BP stable on Lisinopril, will monitor potassium while on med. , will check k in AM     Hyperlipidemia: continue Zocor     Fall: discussed with patient to call for assistance. Fall intervention in place.  Patient now with 2 falls since admitted, without injury, both unwitnessed.      Continue PT/OT     FLU: he report that he received FLU shot this year, not sure about month        Bebeto Kennedy, SARAH  5/13/2019, 8:52 AM

## 2019-05-13 NOTE — ROUTINE PROCESS
Bedside, Verbal and Written shift change report given to conrad jimenez (oncoming nurse) by Justin Leon (offgoing nurse). Report included the following information SBAR, Intake/Output and MAR.

## 2019-05-13 NOTE — PROGRESS NOTES
Problem: Self Care Deficits Care Plan (Adult)  Goal: *Acute Goals and Plan of Care (Insert Text)  Description  -CLOF: OCCUPATIONAL THERAPY SHORT TERM GOALS        Updated 5/13/19    1. Patient will perform Upper body ADL's without adaptive equipment with modified independence. 2.  Patient will perform Lower body ADL's with adaptive equipment with moderate assistance. 3.  Patient will perform toileting task with moderate assistance  with Good safety to reduce falls risk. 4.  Patient will perform bedside commode transfers with Jerzy Litter and contact guard assistance while adhering to RLE TTWB. 5.  Patient will perform standing static/dynamic balance activities for improved ADL function with minimal assistance and Fair+ balance and safety awareness while adhering to RLE TTWB. 6.  Patient will improve Barthel index scores to atleast [id number] to improve functional mobility. 7.  Patient will utilize energy conservation techniques during functional activities with minimal verbal cues. Updated 5/6/19    1. Patient will perform Upper body ADL's without adaptive equipment with modified independence. -CLOF: bathing w/ SBA, dressing w/ Min A  2. Patient will perform Lower body ADL's with adaptive equipment with moderate assistance. -CLOF: bathing w/ Mod A , dressing w/ Max A using AE: reacher  3. Patient will perform toileting task with moderate assistance  with Good safety to reduce falls risk. -CLOF: Mod I bladder mgmt using urinal, Dep clothing mgmt and bowel mgmt  4. Patient will perform bedside commode transfers with Rolling Walker and minimal assistance while adhering to RLE TTWB. - CLOF: Min A w/ RW adhering to R LE TTWB, upgrade  5. Patient will perform standing static/dynamic balance activities for improved ADL function with minimal assistance and Fair+ balance and safety awareness while adhering to RLE TTWB.-CLOF: Fair- static, Fair- dynamic w/ RW and CGA - Min A  6.   Patient will improve Barthel index scores to atleast 65/100 to improve functional mobility. - CLOF: 60/100  7. Patient will utilize energy conservation techniques during functional activities with minimal verbal cues. - CLOF: Max verbal cues     Initiated 4/30/2019 and to be accomplished within 2 Week(s)    1. Patient will perform Upper body ADL's without adaptive equipment with modified independence. -CLOF: bathing and dressing w/ set up  2. Patient will perform Lower body ADL's with adaptive equipment with moderate assistance. -CLOF: bathing and dressing w/ Max A  3. Patient will perform toileting task with moderate assistance  with Good safety to reduce falls risk. -CLOF: Mod I w/ bladder mgmt using urinal. Dep bowel mgmt, Max A clothing mgmt  4. Patient will perform bedside commode transfers with Rolling Walker and minimal assistance while adhering to RLE TTWB.-CLOF: CGA w/ RW while adhering to RLE TTWB  5. Patient will perform standing static/dynamic balance activities for improved ADL function with minimal assistance and Fair+ balance and safety awareness while adhering to RLE TTWB.-CLOF: Karmen static and Fair dynamic w/ RW & CGA while adhering to RLE TTWB  6. Patient will improve Barthel index scores to atleast [id number] to improve functional mobility. -CLOF: 55/100  7. Patient will utilize energy conservation techniques during functional activities with minimal verbal cues. -CLOF:  Max verbal cues    OCCUPATIONAL THERAPY LONG TERM GOALS   Initiated 4/30/2019 and to be accomplished within 4 Week(s)    1. Patient will perform ADL's with adaptive equipment as needed with modified independence. 2.  Patient will perform toileting task with modified independence with Good safety to reduce falls risk. 3.  Patient will perform all functional transfers utilizing least restrictive device and durable medical equipment as needed with modified independence and Good balance and safety awareness.   4.  Patient will perform standing static/dynamic activity for improved ADL function with modified independence and Good balance and safety awareness. 5.  Patient will improve Barthel index score to 95/100 to improve independence with mobility. 6. Patient will perform UE HEP with Modified Jones to increase BUE strength for continued participation in ADLs. Therapist: Luca Walker    4/30/2019                Outcome: Progressing Towards Goal   [facility]  OCCUPATIONAL THERAPY WEEKLY SUMMARY   Reporting period:  from 5/6/19 through 5/13/19       Patient: Buddy Her (2 y.o. male)   Date: 5/13/2019    Primary Diagnosis: rt hip fracture       Attending Physician: Dustin Her MD Treatment Diagnosis  Treatment Diagnosis: need for assist with self care  Treatment Diagnosis 2: muscle weakness  Precautions: Fall, TTWB(TTWB RLE)  Rehab Potential : Good    Skill interventions and education provided with clinical rationale (include individualized treatment techniques and standardized tests):  Skilled Occupational services were provided utilizing ADL retraining, instruction on adaptive equipment training, safety awareness and energy conservation techniques incorporating balance retraining & UE ROM techniques, therapeutic exercise and activities incorporating strengthening in order to improve ADL performance skills and functional mobility. Patient has made fair progress, continue OT skilled services in order for patient to reach maximal functional potential towards goals for safe d/c home.   Using a comparative statement, summarize significant progress toward goals as a result of skilled intervention provided:  Patient has made Fair progress towards their Occupational Therapy goals in the following areas: grooming, bathing, upper body dressing, lower body dressing, toileting and toilet transfer using Reacher  Identify remaining functional areas, impairments limiting progress and/or barriers to improvement:  Patient would benefit from continued skilled Occupational Therapy Services to address the following functional deficits in balance, coordination, safety awareness, strength and functional activity tolerance, which is inhibiting independence with ADL performance skills and functional mobility. OBJECTIVE DATA SUMMARY:       INITIAL ASSESSMENT WEEKLY PROGRESS   COGNITIVE STATUS: COGNITIVE STATUS:   Neurologic State: Alert  Orientation Level: Oriented X4(forgetful at times)  Cognition: Follows commands  Perception: Appears intact  Perseveration: No perseveration noted  Safety/Judgement: Fall prevention Neurologic State: Confused  Orientation Level: Oriented to person, Oriented to situation  Cognition: Impulsive, Impaired decision making, Poor safety awareness  Perception: Appears intact  Perseveration: No perseveration noted  Safety/Judgement: Fall prevention   PAIN: PAIN:   Pain Scale 1: Numeric (0 - 10)  Pain Intensity 1: 4  Pain Onset 1: acute  Pain Location 1: Hip  Pain Orientation 1: Right  Pain Description 1: Aching  Pain Intervention(s) 1: Medication (see MAR)  Patient Stated Pain Goal: 0  Pain Reassessment 1: Yes     Pain Scale 1: Visual  Pain Intensity 1: 0  Pain Onset 1: acute  Pain Location 1: Hip  Pain Orientation 1: Right  Pain Description 1: Aching  Pain Intervention(s) 1: Declines  Patient Stated Pain Goal: 0  Pain Reassessment 1: Patient sleeping   BED MOBILITY BED MOBILITY   Supine to Sit: Moderate assistance(for RLE management)  Scooting: Minimum assistance Supine to Sit: Moderate assistance, Assist x1  Scooting:  Moderate assistance, Assist x1   ADL SELF CARE ADL SELF CARE   Oral Facial Hygiene/Grooming: Setup  Type of Bath: Basin/Soap/Water  Upper Body Dressing: Stand-by assistance(donning pullover shirt)  Lower Body Dressing: Maximum assistance(2/2 R LE pain)  Toileting: Minimum assistance(A with clothing mgmt) Bathing Assistance: Stand-by assistance  Position Performed: (seated on BSC)  Cues: Verbal cues provided, Visual cues provided    Dressing Assistance: Set-up  Hospital Gown: Minimum  assistance  Shirt simulation with hospital gown: Minimum  assistance  Pullover Shirt: Set-up, Modified independent    Bathing Assistance: Maximum assistance  Perineal  : Moderate assistance(SBA anterior, dep posterior )  Position Performed: (seated on BSC anterior, in stance w/ RW posterior)  Cues: Verbal cues provided(to maintain RLE TTWB)  Lower Body : Supervision, Set-up(upper thighs)  Position Performed: Seated edge of bed    Bladder Hygiene: Modified independent  Bowel Hygiene: Total assistance (dependent)  Clothing Management: Total assistance (dependent)  Adaptive Equipment: Walker(BS)    Grooming Assistance: Set-up  Washing Face: Stand-by assistance  Brushing Teeth: Supervision, Set-up(seated w/c level at bedside table)  Brushing/Combing Hair: Set-up, Modified independent    Dressing Assistance: Maximum assistance  Protective Undergarmet: Maximum assistance  Pants With Elastic Waist: Total assistance(dependent), Maximum assistance(Max A thread seated eob, dep hike in stance w/ RW)  Socks: Maximum assistance  Position Performed: Other (comment)(seated on BSC, in stance w/ RW)  Cues: Verbal cues provided(to maintain RLE TTWB)  Adaptive Equipment Used: Reacher    Feeding Assistance: Modified independent   TRANSFERS TRANSFERS   Sit to Stand: Moderate assistance  Stand to Sit: Moderate assistance  Bed to Chair: Minimum assistance  Toilet Transfer : Moderate assistance, Assist x1 Sit to Stand: Minimum assistance, Assist x1  Stand to Sit: Minimum assistance, Assist x1  Bed to Chair: Contact guard assistance, Additional time  Toilet Transfer : Minimum assistance, Assist x1   Toilet Transfer :  Moderate assistance, Assist x1  Cues: Verbal cues provided(to maintain RLE TTWB)  Adaptive Equipment: Dyana Bacca (comment), Walker (comment) Toilet Transfer : Minimum assistance, Assist x1  Cues: Verbal cues provided(to maintain RLE TTWB)  Adaptive Equipment: Bedside commode, Walker (comment)   BALANCE BALANCE   Sitting: With support  Sitting - Static: Fair (occasional)  Sitting - Dynamic: Fair (occasional)  Standing: With support, Impaired  Standing - Static: Fair  Standing - Dynamic : Fair(-) Sitting: With support  Sitting - Static: Fair (occasional)  Sitting - Dynamic: Fair (occasional)(-)  Standing: With support, Pull to stand, Impaired  Standing - Static: Fair(-)  Standing - Dynamic : Fair(-)       GROSS ASSESSMENT  GROSS ASSESSMENT   AROM: Generally decreased, functional  PROM: Generally decreased, functional  Strength: Generally decreased, functional(R hip 2-5, R knee 4+/5; L hip 4-/5, L knee 4+/5)  Coordination: Generally decreased, functional  Tone: Normal  Sensation: Intact(BUEs) AROM: Within functional limits(BUEs)  PROM: Generally decreased, functional  Strength: Generally decreased, functional(BUEs 4-/5)  Coordination: Within functional limits(BUEs)  Tone: Normal(BUEs)  Sensation: Intact(BUEs)   COORDINATION COORDINATION   Fine Motor Skills-Upper: Left Intact, Right Intact  Gross Motor Skills-Upper: Left Intact, Right Intact Fine Motor Skills-Upper: Left Intact, Right Intact  Gross Motor Skills-Upper: Left Intact, Right Intact   VISUAL/PERCEPTUAL VISUAL/PERCEPTUAL   Corrective Lenses: Reading glasses   Corrective Lenses: Reading glasses     AUDITORY: AUDITORY:   Auditory Impairment: Hard of hearing, bilateral Auditory Impairment: Hard of hearing, left side       INSTRUMENTAL  ADL'S:    INSTRUMENTAL ADL'S:          THE BARTHEL INDEX  ACTIVITY   SCORE   FEEDING  0=unable  5=needs help cutting,spreading butter,etc., or modified diet  10= independent   10   BATHING  0=dependent  5=independent (or in shower   0   GROOMING  0=needs help  5=independent face/hair/teeth/shaving (implements provided)   5   DRESSING  0=dependent  5=needs help but can do about half unaided  10=independent(including buttons, zips,laces etc.)   5 BOWELS  0=incontinent  5=occasional accident  10=continent   10   BLADDER  0=incontinent, or catheterized and unable to manage alone  5=occasional accident  10=continent   10   TOILET USE  0=dependent  5=needs some help, but can do something alone  10=independent (on and off, dressing, wiping)   5   TRANSFER (BED TO CHAIR AND BACK)  0=unable, no sitting balance  5=major help(one or two people,physical), can sit  10=minor help(verbal or physical)  15=independent   10   MOBILITY (ON LEVEL SURFACES)  0=immobile or <50 yards  5=wheelchair independent,including corners,>50 yards  10=walkes with help of one person (verbal or physical) >50 yards  15=independent(but may use any aid; for example, stick) >50 yards   5   STAIRS  0=unable  5=needs help (verbal, physical, carrying aid)  10=independent   0            TOTAL:                  60/100     Treatment:  Bed mobility: Mod A supine to sit edge of bed, verbal cues for hand placement for bed rails to assist. Patient c/o 5/10 R hip pain-nursing notified . Functional transfer: Min A w/ RW and vc's to maintain RLE TTWB w/ good adherence. ADL routine for bathing & dressing w/ instruction provided for use of AE: reacher for LB dressing tasks-level of assist noted above. Functional transfer: BSC-> w/c Min A w/ RW and vc's to maintain RLE TTWB w/ good adherence. Patient escorted via w/c to dining/activities room per nurisng request for Supv d/t pt's impaired safety awareness, call bell within reach, instruction provided to use call bell to request assist e.g. Functional transfer in order to prevent falls. Patient's response to Treatment rendered:  Patient participated in review of O.T. plan of care and progress towards goals w/ completion of weekly progress note for client centered approach. Patient expected Discharge Location:  ? Private Residence  ? LANRE/ILF  ? LTC  ? Other:    Plan: Continue OT services as established on the Plan of Care for 3-6 times a week.     Treatment Minutes:    OT and Assistant have had a weekly case conference regarding the above treatment:  ? Yes     ?  No    Therapist:   Liat Koehler,         Date:5/13/2019     Forward to OT for co-signature when completed

## 2019-05-13 NOTE — PROGRESS NOTES
I have reviewed this patient's current medication list and recent laboratory results. At this time, I do not suggest any drug therapy adjustments or additional laboratory monitoring. Thank you,  Sree GUADARRAMA  Ph. M. S.  5/13/2019

## 2019-05-13 NOTE — PROGRESS NOTES
[address] [facility]   PHYSICAL THERAPY DAILY TREATMENT NOTE      Patient: Catracho Robb (65 y.o. male)               Date: 5/13/2019    Physician: Hammad Escalona MD  Primary Diagnosis: rt hip fracture          Treatment Diagnosis  Treatment Diagnosis: need for assist with self care  Treatment Diagnosis 2: muscle weakness  Precautions: Fall, TTWB(TTWB RLE)  Vital Signs  Vital Signs  Temp: 97.5 °F (36.4 °C)  Temp Source: Oral  Pulse (Heart Rate): 74  O2 Sat (%): 95 %  Level of Consciousness: Alert  BP: 112/61  MAP (Calculated): 78  Cognitive Status:  Mental Status  Neurologic State: Alert  Orientation Level: Oriented to person;Oriented to place;Oriented to situation  Cognition: Impulsive;Poor safety awareness  Pain  Bed Mobility Training  Bed Mobility Training  Supine to Sit: Moderate assistance;Assist x1  Scooting: Moderate assistance;Assist x1  Balance  Sitting: With support  Sitting - Static: Fair (occasional)  Sitting - Dynamic: Fair (occasional)  Standing: With support  Standing - Static: Fair  Standing - Dynamic : Fair((-))  Transfer Training  Transfer Training  Sit to Stand: Minimum assistance; Additional time;Assist x1  Stand to Sit: Minimum assistance; Additional time;Assist x1  Sit to Stand: Minimum assistance; Additional time;Assist x1  Gait Training  Gait  Base of Support: Center of gravity altered;Narrowed; Shift to left  Step Length: Left shortened;Right shortened  Gait Abnormalities: Antalgic;Decreased step clearance; Step to gait  Ambulation - Level of Assistance: Contact guard assistance;Minimal assistance  Distance (ft): 55 Feet (ft)  Assistive Device: Gait belt;Walker, rolling  Gait Abnormalities: Antalgic;Decreased step clearance; Step to gait  Right Side Weight Bearing: Toe touch  Treatment:   Pt tolerated treatment session well and was able to amb 55ft min A/CGA, TTWB R LE using FWW+w/c follow and moderate VC's for maintaining WB precautions and proper LE stepping/AD sequencing.  Pt amb with antalgic gait on R, decrease step length/step clearance, and decrease gait urbano but no LOB. Pt completed seated TE AROM B LE's 1.5#'s: ankle pumps, LAQ's, hip marches (AROM R LE), hip add using ball, and red TB (hip abd, hamstring curls) 2x15 reps to improve overall strength/endurance in prep for performing transfers activities. Patient/Caregiver Education:   Pt /Caregiver Education on importance of maintaining TTWB on R LE during all transfers, gait, and standing tasks to promote healing. Pt demonstrated understanding after receiving moderate VC's for correct technique. Continued pt education on importance of utilizing call bell at all times for assistance with OOB activities to maximize safety and minimize risk of falls. Pt will require reinforcement for compliance due to cognitive limitations. ASSESSMENT:  Patient continues to benefit from Skilled PT services to improve strength, endurance, balance, and functional (I) with transfers/gait while maintaining WB restrictions. Progression toward goals:  ?      Improving appropriately and progressing toward goals  ? Improving slowly and progressing toward goals  ? Not making progress toward goals and plan of care will be adjusted     Treatment session: 55 minutes.   Therapist:   Elia Roche PTA,          5/13/2019

## 2019-05-13 NOTE — PROGRESS NOTES
conducted a Follow up consultation and Spiritual Assessment for Buddy Her, who is a 80 y. o.,male. Patients Primary Language is: [place]. According to the patients EMR Rastafari Affiliation is: Hoahaoism. The  provided the following Interventions:  Continued the relationship of care and support. He was not in his room; CNA pointed out he was in the dining room. Patient was in front of TV. Patient was able to remember 's visit. Listened empathically to patient. He had no specific worries or concerns at this time. Offered assurance of prayer on patient's behalf. Chart reviewed. The following outcomes were achieved:  Patient expressed gratitude for pastoral care visit. Assessment:  There are no further spiritual or Jew issues which require Spiritual Care Services interventions at this time. Plan:  Chaplains will continue to follow and provide pastoral care as needed or requested.  recommends bedside caregivers page  on duty if patient shows signs of acute spiritual or emotional distress. The Rev.  R Sherrill Joya [address] [facility] [phone] / [facility] [phone]

## 2019-05-13 NOTE — PROGRESS NOTES
Met with Mrs. Ashford while Mr. Ashford attended the [employer] playing activity. Discussed discharge planning. Mrs. Ashford would like a Hospital Bed and a walker for Mr. Ashford at discharge. Discussed qualifications regarding hospital bed and she verbalized understanding. Dragan Meyer discussed and she would like to use [facility] as she has used them in the past, confirmed with Nurys Barton with [facility]. Requested information regarding ramps and brochures provided. Will continue to follow.   Signed By: Rome Elizabeth     May 13, 2019

## 2019-05-14 LAB
GLUCOSE BLD STRIP.AUTO-MCNC: 135 MG/DL (ref 70–110)
GLUCOSE BLD STRIP.AUTO-MCNC: 140 MG/DL (ref 70–110)
GLUCOSE BLD STRIP.AUTO-MCNC: 161 MG/DL (ref 70–110)
GLUCOSE BLD STRIP.AUTO-MCNC: 244 MG/DL (ref 70–110)

## 2019-05-14 PROCEDURE — 82962 GLUCOSE BLOOD TEST: CPT

## 2019-05-14 PROCEDURE — 74011250637 HC RX REV CODE- 250/637: Performed by: NURSE PRACTITIONER

## 2019-05-14 PROCEDURE — 74011250637 HC RX REV CODE- 250/637: Performed by: INTERNAL MEDICINE

## 2019-05-14 PROCEDURE — 74011636637 HC RX REV CODE- 636/637: Performed by: INTERNAL MEDICINE

## 2019-05-14 RX ADMIN — BISACODYL 10 MG: 10 SUPPOSITORY RECTAL at 02:40

## 2019-05-14 RX ADMIN — OXYCODONE HYDROCHLORIDE AND ACETAMINOPHEN 2 TABLET: 10; 325 TABLET ORAL at 02:40

## 2019-05-14 RX ADMIN — INSULIN LISPRO 2 UNITS: 100 INJECTION, SOLUTION INTRAVENOUS; SUBCUTANEOUS at 21:01

## 2019-05-14 RX ADMIN — WARFARIN SODIUM 2 MG: 2 TABLET ORAL at 17:19

## 2019-05-14 RX ADMIN — CHOLECALCIFEROL (VITAMIN D3) 25 MCG (1,000 UNIT) TABLET 1000 UNITS: at 09:37

## 2019-05-14 RX ADMIN — INSULIN LISPRO 4 UNITS: 100 INJECTION, SOLUTION INTRAVENOUS; SUBCUTANEOUS at 11:39

## 2019-05-14 RX ADMIN — POLYETHYLENE GLYCOL 3350 17 G: 17 POWDER, FOR SOLUTION ORAL at 09:37

## 2019-05-14 RX ADMIN — SIMVASTATIN 20 MG: 20 TABLET, FILM COATED ORAL at 21:01

## 2019-05-14 RX ADMIN — OXYCODONE HYDROCHLORIDE AND ACETAMINOPHEN 2 TABLET: 10; 325 TABLET ORAL at 17:19

## 2019-05-14 RX ADMIN — LISINOPRIL 10 MG: 10 TABLET ORAL at 09:37

## 2019-05-14 RX ADMIN — METFORMIN HYDROCHLORIDE 1000 MG: 500 TABLET ORAL at 17:18

## 2019-05-14 RX ADMIN — DOCUSATE SODIUM 100 MG: 100 CAPSULE, LIQUID FILLED ORAL at 09:37

## 2019-05-14 RX ADMIN — METFORMIN HYDROCHLORIDE 1000 MG: 500 TABLET ORAL at 09:37

## 2019-05-14 RX ADMIN — PANTOPRAZOLE SODIUM 40 MG: 40 TABLET, DELAYED RELEASE ORAL at 09:37

## 2019-05-14 RX ADMIN — OXYCODONE HYDROCHLORIDE AND ACETAMINOPHEN 2 TABLET: 10; 325 TABLET ORAL at 11:35

## 2019-05-14 NOTE — ROUTINE PROCESS
0240 patient medicated with prn oxycodone 10-325mg 2 tablet for pain in the right leg. Patient was again, attempting to get out of bed and when asked why he didn't used the call bell, patient stated \"\"I dont know where is my call bell\", call bell was on patient's bed. Patient assisted back to bed because patient had a hard time standing up. Side rails raised, bed alarm on. Call bell placed beside patient.

## 2019-05-14 NOTE — ROUTINE PROCESS
Bedside, Verbal and Written shift change report given to conrad jimenez (oncoming nurse) by Marcos Iverson (offgoing nurse). Report included the following information SBAR, Intake/Output and MAR.

## 2019-05-14 NOTE — PROGRESS NOTES
Problem: Mobility Impaired (Adult and Pediatric)  Goal: *Acute Goals and Plan of Care (Insert Text)  Description  PHYSICAL THERAPY STG GOALS :  Initiated 4/30/2019 and to be accomplished within 2 Weeks (Updated 5/14/19)    1. Patient will move from supine to sit and sit to supine  and roll side to side in bed with stand by assistance. (Progressing; Min A for RLE management)    2. Patient will transfer from bed to chair and chair to bed with contact guard assist using RW with WB compliance. (Achieved)  3. Patient will perform sit to stand with contact guard assist with Fair+ balance and safety awareness with WB compliance. (Achieved)   4. Patient will ambulate with contact guard assist for 75 feet with RW on level surfaces with 2 turns with WB compliance. (Achieved)   5. Patient will ascend/descend 1 step with bilateral handrail(s) with minimal assistance to allow for safe home access/exit with WB compliance. (Achieved with cueing for TTWB compliance, inside // bars)  6. Patient will improve standardized test score for [place] Standing Balance Scale 3/5 to reduce fall risk. (Maintained; 2+/5)    PHYSICAL THERAPY STG GOALS :  Initiated 4/30/2019 and to be accomplished within 2 Weeks (Updated 5/7/19)    1. Patient will move from supine to sit and sit to supine  and roll side to side in bed with stand by assistance. (Progressing; Min-Mod A)    2. Patient will transfer from bed to chair and chair to bed with contact guard assist using RW with WB compliance. (Achieved)  3. Patient will perform sit to stand with contact guard assist with Fair+ balance and safety awareness with WB compliance. (Progressing; CGA-Min A)   4. Patient will ambulate with contact guard assist for 75 feet with RW on level surfaces with 2 turns with WB compliance. (Progressing; 100ft with RW and CGA, cues for TTWB and no 1 turn)   5.   Patient will ascend/descend 1 step with bilateral handrail(s) with minimal assistance to allow for safe home access/exit with WB compliance. (Not assessed due to increased difficulty with TTWB during ambulation)  6. Patient will improve standardized test score for [place] Standing Balance Scale 3/5 to reduce fall risk. (Progressing; 2+/5)    PHYSICAL THERAPY STG GOALS :  Initiated 4/30/2019 and to be accomplished within 2 Weeks    1. Patient will move from supine to sit and sit to supine  and roll side to side in bed with stand by assistance. 2.  Patient will transfer from bed to chair and chair to bed with contact guard assist using RW with WB compliance. 3.  Patient will perform sit to stand with contact guard assist with Fair+ balance and safety awareness with WB compliance. 4.  Patient will ambulate with contact guard assist for 75 feet with RW on level surfaces with 2 turns with WB compliance. 5.  Patient will ascend/descend 1 step with bilateral handrail(s) with minimal assistance to allow for safe home access/exit with WB compliance. 6.  Patient will improve standardized test score for [place] Standing Balance Scale 3/5 to reduce fall risk. PHYSICAL THERAPY LTG GOALS :  Initiated 4/30/2019 and to be accomplished within 4 Weeks    1. Patient will move from supine to sit and sit to supine  and roll side to side in bed with modified independence. 2.  Patient will transfer from bed to chair and chair to bed with supervision/set-up using RW with WB compliance. 3.  Patient will perform sit to stand with supervision/set-up with Good balance and safety awareness. 4.  Patient will ambulate with supervision/set-up for 200 feet with RW on level surfaces and be able to maneuver through narrow spaces and obstacles without loss of balance with WB compliance. 5.  Patient will ascend/descend 3 stairs with NO handrail(s) with stand by assistance to allow for safe home access/exit. 6.  Patient will improve standardized test score for [place] Standing Balance Scale 4/5 to reduce fall risk. Physical Therapist:   Rosalind Figueroa PT  on 4/30/2019              Outcome: Progressing Towards Goal    [facility]   PHYSICAL THERAPY WEEKLY PROGRESS REPORT  Reporting Period:  Date:   5/7/19  to 5/14/19      Patient: Kate Alexis (86 y.o. male)                         Date: 5/14/2019    Primary Diagnosis: rt hip fracture      Attending Physician: Verónica Marina MD   Treatment Diagnosis  Treatment Diagnosis: need for assist with self care  Treatment Diagnosis 2: muscle weakness  Precautions:  Fall, TTWB(TTWB RLE)  Rehab Potential : Good:    Skill interventions and education provided with clinical rationale (include individualized treatment techniques and standardized tests):   Skilled Physical Therapy services were provided with  therapeutic exercises to address strength, endurance, mobility. Therapeutic activities to address bed mobility, transfers. Gait training to improve gait pattern with TTWB compliance. Stair training to improve safe negotiation of step. Patient and family education on pain management. Using a comparative statement, summarize significant progress toward goals as a result of skilled intervention provided:  Patient has made Good progress towards their Physical Therapy goals in the areas of bed mobility from min /mod A to min A for RLE management, transfers from CGA/min A to CGA, ambulation with min A and cueing for TTWB to CGA with very rare cueing when fatigued or with increased pain. Identify remaining functional areas, impairments limiting progress and/or barriers to improvement:  Patient would benefit from continues PT services to address the following functional deficits in bed mobility, transfers, balance, gait pattern with TTWB compliance, stair negotiation.      OBJECTIVE DATA SUMMARY:     INITIAL ASSESSMENT WEEKLY ASSESSMENT   COGNITIVE STATUS COGNITIVE STATUS   Neurologic State: Alert  Orientation Level: Oriented X4(forgetful at times)  Cognition: Follows commands  Perception: Appears intact  Perseveration: No perseveration noted  Safety/Judgement: Fall prevention Neurologic State: Confused  Orientation Level: Oriented to person, Oriented to situation, Oriented to place  Cognition: Poor safety awareness, Impulsive  Perception: Appears intact  Perseveration: No perseveration noted  Safety/Judgement: Fall prevention   PAIN PAIN   Pain Scale 1: Numeric (0 - 10)  Pain Intensity 1: 4  Pain Onset 1: acute  Pain Location 1: Hip  Pain Orientation 1: Right  Pain Description 1: Aching  Pain Intervention(s) 1: Medication (see MAR)  Patient Stated Pain Goal: 0  Pain Reassessment 1: Yes Pain Scale 1: Numeric (0 - 10)  Pain Intensity 1: 6  Pain Onset 1: acute  Pain Location 1: Hip  Pain Orientation 1: Left  Pain Description 1: Aching  Pain Intervention(s) 1: Medication (see MAR), Emotional support, Distraction, Repositioned  Patient Stated Pain Goal: 0  Pain Reassessment 1: Patient sleeping   GROSS ASSESSMENT GROSS ASSESSMENT   AROM: Generally decreased, functional  PROM: Generally decreased, functional  Strength: Generally decreased, functional(R hip 2-5, R knee 4+/5; L hip 4-/5, L knee 4+/5)  Coordination: Generally decreased, functional  Tone: Normal  Sensation: Intact(BUEs) AROM: Within functional limits(BUEs)  PROM: Generally decreased, functional  Strength: Generally decreased, functional(BUEs 4-/5)  Coordination: Within functional limits(BUEs)  Tone: Normal(BUEs)  Sensation: Intact(BUEs)   BED MOBILITY BED MOBILITY   Supine to Sit: Moderate assistance(for RLE management)  Scooting: Minimum assistance Supine to Sit: Moderate assistance, Assist x1  Scooting:  Moderate assistance, Assist x1   GAIT GAIT   Base of Support: Center of gravity altered  Speed/Tamanna: Slow  Step Length: Right shortened, Left shortened(R > L)  Stance: Left decreased  Gait Abnormalities: Antalgic, Decreased step clearance  Ambulation - Level of Assistance: Minimal assistance  Distance (ft): 2 Feet (ft)  Assistive Device: Gait belt, Walker, rolling Base of Support: Center of gravity altered, Narrowed, Shift to left  Speed/Tamanna: Slow  Step Length: Left shortened, Right shortened  Stance: Left decreased  Gait Abnormalities: Antalgic, Decreased step clearance, Step to gait  Ambulation - Level of Assistance: Contact guard assistance, Minimal assistance  Distance (ft): 55 Feet (ft)  Assistive Device: Gait belt, Walker, rolling         Toe touch Toe touch   Full Full   TRANSFERS TRANSFERS   Sit to Stand: Moderate assistance  Stand to Sit: Moderate assistance  Bed to Chair: Minimum assistance Sit to Stand: Minimum assistance, Additional time, Assist x1  Stand to Sit: Minimum assistance, Additional time, Assist x1  Bed to Chair: Contact guard assistance, Additional time   BALANCE BALANCE   Sitting: With support  Sitting - Static: Fair (occasional)  Sitting - Dynamic: Fair (occasional)  Standing: With support, Impaired  Standing - Static: Fair  Standing - Dynamic : Fair(-) Sitting: With support  Sitting - Static: Fair (occasional)  Sitting - Dynamic: Fair (occasional)  Standing: With support  Standing - Static: Fair  Standing - Dynamic : Fair((-))   WHEELCHAIR MOBILITY/MGMT WHEELCHAIR MOBILITY/MGMT         Activity Tolerance:  Fair Activity Tolerance: Fair, depending pain   Visual/Perceptual   Corrective Lenses: Reading glasses      Visual/Perceptual   Vision  Corrective Lenses: Reading glasses        Auditory:   Auditory Impairment: Hard of hearing, bilateral    Auditory:   Auditory  Auditory Impairment: Hard of hearing, left side       Steps: both, parallel bars   Clinical Decision makin+/5 Clinical Decision makin+/5 [place] Standing Balance Scale     Treatment:   Upon iniital presentation, pt in bed, grimacing with pain and holding onto L side of abdomen with slight movement. Although in pain, pt agreeable to participation.  RLE management with min A required for bed mobility; however, prior to achieving sitting at EOB, pt grimaced of additional pain in R hip and L abdomen. Therapist assisted back to supine and f/u with nurse to request pain meds. Therapist educated wife on pain management and limitations of mobility this session due to pain, therapist informed pt and wife that treatment will be attempted again after pain meds. Upon return, pt continues to grasp L abdomen with facial grimacing during attempted bed mobility, for this reason, therapy rendered at bed side. TE rendered to address strength, endurance, mobility and included: ankle pumps, heel slides, supine hip abd, quad sets 15 reps x 2 sets each with rest breaks due to pain, required active assistance to initiate supine hip abd on RLE. Nurse presented and was informed of L abdomen pain. Patient's response to treatment rendered:  Limited participation due to L abdomen and R hip pain. Patient expected Discharge Location:  ?X Private Residence  ? [facility]  ? LTC  ? Other:    Plan: Continue Skilled PT services as established by the Plan of Care for 3-6 times a week. PT and Assistant have had a weekly case conference regarding the above treatment:  ?X Yes     ? No       Treatment session:  48 minutes. Therapist: Lulu Osborn, PT       Date:5/14/2019  Forward to PT for co-signature when completed.

## 2019-05-14 NOTE — PROGRESS NOTES
Nutrition follow-up/  Plan of care TCC      RECOMMENDATIONS:     1. Diabetic Consistent Carb diet  2. Monitor weight, labs and PO intake  3. RD to follow     GOALS:     1. Ongoing: PO intake meets >75% of protein/calorie needs by 5/21  2. Ongoing: Weight Maintenance (+/- 1-2 lb) by 5/21    ASSESSMENT:     Weight status is classified as normal per Body mass index is 20.11 kg/m². PO intake is fair. Labs noted. BG range (140-231) over the past 24 hours; A1c (6.8). Pt w/ hyponatremia and elevated BUN. Nutrition recommendations listed. RD to follow. Nutrition Risk:  [] High  [] Moderate [x]  Low    SUBJECTIVE/OBJECTIVE:   (5/14): Pt has a variable appetite but fair overall. Observed 45% of a very light lunch consumed but had a good breakfast this morning. Last BM (5/14). Weight stable from last week but noted a 7 lb or 4.9% loss since admission weight 2 weeks ago. Continue to encourage intake and will monitor.     (5/7): Current weight on record showing a 6 lb or 4.2% loss since admission x 1 week ago. Noted stable weight 140-145 lb PTA. Last BM (5/5) and on bowel regimen. Pt seen in room later in the day. Stated his appetite/PO intake has been normal, but he has never been a big eater. Per RN Pt has been consuming ~50% of meals. Encouraged intake and will continue to follow. (4/30): Transferred from [address] on 4/29. Admitted s/p fall with right hip fracture. S/P right hip percutaneous pinning on 4/26. Has history of Alzheimer's disease, diabetes, HTN and a-fib. Patient reports having a good appetite and weight was stable at 145-150 lb PTA. Observed 75% intake of breakfast meal during visit. Denies food allergies and problems with chewing/swallowing. Will monitor.      Information Obtained from:    [x] Chart Review   [x] Patient   [] Family/Caregiver   [x] Nurse/Physician   [] Interdisciplinary Meeting/Rounds    Diet: Diabetic Consistent Carb diet  Medications: [x] Reviewed    Allergies: [x] Reviewed Patient Active Problem List   Diagnosis Code    Hip fracture ([facility] S72.009A    Closed right hip fracture, initial encounter ([facility] S72.001A    Atrial fibrillation ([place] [address].) I48.91    HTN (hypertension) I10     Past Medical History:   Diagnosis Date    Alzheimer's disease     Atrial fibrillation ([facility]     Diabetes ([facility]     Hypertension       Labs:    Lab Results   Component Value Date/Time    Sodium 134 (L) 05/13/2019 04:20 AM    Potassium 5.4 05/13/2019 04:20 AM    Chloride 103 05/13/2019 04:20 AM    CO2 26 05/13/2019 04:20 AM    Anion gap 5 05/13/2019 04:20 AM    Glucose 131 (H) 05/13/2019 04:20 AM    BUN 27 (H) 05/13/2019 04:20 AM    Creatinine 1.00 05/13/2019 04:20 AM    Calcium 8.5 05/13/2019 04:20 AM     Anthropometrics: BMI (calculated): 20  Last 3 Recorded Weights in this Encounter    04/29/19 1920 05/06/19 1103 05/14/19 1620   Weight: 64.4 kg (142 lb) 61.8 kg (136 lb 3.2 oz) 61.6 kg (135 lb 12.8 oz)      Ht Readings from Last 1 Encounters:   04/29/19 5' 9\" (1.753 m)     Weight Metrics 5/14/2019 4/26/2019   Weight 135 lb 12.8 oz 140 lb   BMI 20.05 kg/m2 22.6 kg/m2     No data found.     UBW: 145-150 lb  [x] Weight Loss (7 lb or 4.9% since admission x 2 weeks)  [] Weight Gain  [x] Weight Stable PTA per Pt    Estimated Nutrition Needs: [x] MSJ    Calories: 1700 Kcal Based on:   [x] Actual BW    Protein:   70-84 g Based on:   [x] Actual BW    Fluid:       4664-3118 ml Based on:   [x] Actual BW      Nutrition Problems Identified:   [x] Suboptimal PO intake (variable but fair)  [] Food Allergies  [] Difficulty chewing/swallowing/poor dentition  [] Constipation/Diarrhea   [] Nausea/Vomiting   [] None  [] Other:     Plan:   [x] Therapeutic Diet  [x]  Obtained/adjusted food preferences/tolerances and/or snacks options   []  Supplements added   [] Occupational therapy following for feeding techniques  []  HS snack added   []  Modify diet texture   []  Modify diet for food allergies   []  Educate patient   [] Assist with menu selection   [x]  Monitor PO intake on meal rounds   [x]  Continue inpatient monitoring and intervention   [x]  Participated in discharge planning/Interdisciplinary rounds/Team meetings   []  Other:     Education Needs:   [] Not appropriate for teaching at this time due to:   [x] Identified and addressed    Nutrition Monitoring and Evaluation:  [x] Continue ongoing monitoring and intervention  [] Other    Jeyson Mott

## 2019-05-14 NOTE — PROGRESS NOTES
Problem: Self Care Deficits Care Plan (Adult)  Goal: *Acute Goals and Plan of Care (Insert Text)  Description  -CLOF: OCCUPATIONAL THERAPY SHORT TERM GOALS        Updated 5/13/19    1. Patient will perform Upper body ADL's without adaptive equipment with modified independence. 2.  Patient will perform Lower body ADL's with adaptive equipment with moderate assistance. 3.  Patient will perform toileting task with moderate assistance  with Good safety to reduce falls risk. 4.  Patient will perform bedside commode transfers with Barragan Dew and contact guard assistance while adhering to RLE TTWB. 5.  Patient will perform standing static/dynamic balance activities for improved ADL function with minimal assistance and Fair+ balance and safety awareness while adhering to RLE TTWB. 6.  Patient will improve Barthel index scores to atleast 96224 to improve functional mobility. 7.  Patient will utilize energy conservation techniques during functional activities with minimal verbal cues. Updated 5/6/19    1. Patient will perform Upper body ADL's without adaptive equipment with modified independence. -CLOF: bathing w/ SBA, dressing w/ Min A  2. Patient will perform Lower body ADL's with adaptive equipment with moderate assistance. -CLOF: bathing w/ Mod A , dressing w/ Max A using AE: reacher  3. Patient will perform toileting task with moderate assistance  with Good safety to reduce falls risk. -CLOF: Mod I bladder mgmt using urinal, Dep clothing mgmt and bowel mgmt  4. Patient will perform bedside commode transfers with Rolling Walker and minimal assistance while adhering to RLE TTWB. - CLOF: Min A w/ RW adhering to R LE TTWB, upgrade  5. Patient will perform standing static/dynamic balance activities for improved ADL function with minimal assistance and Fair+ balance and safety awareness while adhering to RLE TTWB.-CLOF: Fair- static, Fair- dynamic w/ RW and CGA - Min A  6.   Patient will improve Barthel index scores to atleast 10529 to improve functional mobility. - CLOF: /100  7. Patient will utilize energy conservation techniques during functional activities with minimal verbal cues. - CLOF: Max verbal cues     Initiated 4/30/2019 and to be accomplished within 2 Week(s)    1. Patient will perform Upper body ADL's without adaptive equipment with modified independence. -CLOF: bathing and dressing w/ set up  2. Patient will perform Lower body ADL's with adaptive equipment with moderate assistance. -CLOF: bathing and dressing w/ Max A  3. Patient will perform toileting task with moderate assistance  with Good safety to reduce falls risk. -CLOF: Mod I w/ bladder mgmt using urinal. Dep bowel mgmt, Max A clothing mgmt  4. Patient will perform bedside commode transfers with Rolling Walker and minimal assistance while adhering to RLE TTWB.-CLOF: CGA w/ RW while adhering to RLE TTWB  5. Patient will perform standing static/dynamic balance activities for improved ADL function with minimal assistance and Fair+ balance and safety awareness while adhering to RLE TTWB.-CLOF: Karmen static and Fair dynamic w/ RW & CGA while adhering to RLE TTWB  6. Patient will improve Barthel index scores to atleast 38127 to improve functional mobility. -CLOF: 55/100  7. Patient will utilize energy conservation techniques during functional activities with minimal verbal cues. -CLOF:  Max verbal cues    OCCUPATIONAL THERAPY LONG TERM GOALS   Initiated 4/30/2019 and to be accomplished within 4 Week(s)    1. Patient will perform ADL's with adaptive equipment as needed with modified independence. 2.  Patient will perform toileting task with modified independence with Good safety to reduce falls risk. 3.  Patient will perform all functional transfers utilizing least restrictive device and durable medical equipment as needed with modified independence and Good balance and safety awareness.   4.  Patient will perform standing static/dynamic activity for improved ADL function with modified independence and Good balance and safety awareness. 5.  Patient will improve Barthel index score to 95/100 to improve independence with mobility. 6. Patient will perform UE HEP with Modified [place] to increase BUE strength for continued participation in ADLs. Therapist: Jaswinder Foreman    4/30/2019                Outcome: Progressing Towards Goal  [facility]   OCCUPATIONAL THERAPY DAILY TREATMENT NOTE      Patient: Clover Kelly (48 y.o. male)       Date: 5/14/2019  Attending Physician: Suzy Mayo MD  Primary Diagnosis: rt hip fracture    Treatment Diagnosis  Treatment Diagnosis: need for assist with self care  Treatment Diagnosis 2: muscle weakness   Precautions : Precautions at Admission: Fall, TTWB(TTWB RLE)  Vital Signs:  Vital Signs  Temp: 97.3 °F (36.3 °C)  Temp Source: Oral  Pulse (Heart Rate): 65  Resp Rate: 16  O2 Sat (%): 92 %  BP: 123/59  MAP (Calculated): 80     Cognitive Status:  Mental Status  Neurologic State: Confused  Orientation Level: Oriented to person;Oriented to situation;Oriented to place  Cognition: Poor safety awareness; Impulsive  Pain:  Pain Screen  Pain Scale 1: Numeric (0 - 10)  Pain Intensity 1: 6  Pain Location 1: Hip  Pain Orientation 1: Left  Pain Description 1: Aching  Pain Intervention(s) 1: Medication (see MAR); Emotional support;Distraction;Repositioned  Patient Stated Pain Goal: 0  Pain Scale 1: Numeric (0 - 10)  Gross Assessment:     Coordination:     Bed Mobility:  Bed Mobility  Rolling:  Moderate assistance;Assist x2  Transfers:        Balance:     ADL Self Care:     ADL Intervention:      First tx session: pt presents lying supine in bed, breakfast arrived, pt requires verbal cues to initiate-nursing present with therapist providing encouragement for pt to set up breakfast meal, in which pt requested therapist to assist, therapist provided assist with cutting pancakes, opening syrup packet, etc. In order to increase p.o. Intake, pt reports he requires a long time to eat his meal, therapist to follow up later. Second tx session: pt presents lying supine in bed, spouse at bedside and reports pt determined previous day with assist provided by spouse and nursing for transfer from w/c<-> toilet with toilet frame and reports transfer was difficult, spouse reports pt declined use of BSC at that time, in which therapist recommended. Therapist issued 3 inn 1 commode over toilet with safety frame lifted away from toilet bowl, height adjusted to accommodate safe transfer from w/c level-nursing notified and pt verbalized understanding to call for assist via call bell for assist with toilet transfers in order to prevent further falls. Patient required Mod A x 2 rolling R <-> L with verbal cues for correct hand placement for bed rails to assist for clothing mgmt and perihygeine-level of assist posted below. Nursing administered pain meds per pt request.   Lower Body Bathing  Perineal  : Moderate assistance(Set up w/ anterior, dep w/ posterior)  Position Performed: Supine  Toileting  Bladder Hygiene: Modified independent(urinal)  Bowel Hygiene: Total assistance (dependent)  Clothing Management: Total assistance (dependent)     Lower Body Dressing Assistance  Protective Undergarmet: Total assistance (dependent)  Pants With Elastic Waist: Total assistance(dependent)  Position Performed: Supine  Feeding  Feeding Assistance: Modified independent  Container Management: Total assistance (dependent)  Cutting Food: Total assistance (dependent)  Utensil Management: Modified independent  Food to Mouth: Modified independent  Drink to Mouth: Modified independent         Therapeutic Exercises:  Patient performed UE THERE EX w/ 4# weighted dowel x 20 reps: bicep curls and triceps extension in order to increase UB strength and functional activity tolerance for improved ADL performance skills and functional transfers. Patient/Caregiver Education:    Pt. Instructed on safety awareness with importance to utilize call bell to request assist with functional transfers to prevent falls, patient verbalized understanding and provided accurate demonstration. ASSESSMENT:  Patient continues to demonstrate the need for skilled Occupational Therapy services to improve self-feeding, grooming, bathing, upper body dressing, lower body dressing, toileting and toilet transfer  Progression toward goals:  ?      Improving appropriately and progressing toward goals  ? Improving slowly and progressing toward goals  ?       Not making progress toward goals and plan of care will be adjusted     Treatment session:   40 minutes    Therapist:    Luca Walker,  5/14/2019

## 2019-05-14 NOTE — ROUTINE PROCESS
@ 1137 Percocet  mg 2 tabs po for complaints of left and right hip pain rated  6/10. Wife at bedside. Re educated to call to get out of bed a or go to bathroom. Patient verbalized OK.

## 2019-05-14 NOTE — ROUTINE PROCESS
Patient comfortably sleeping, no complaints of pain. Side rails up, bed alarm on, call bell placed beside patient.

## 2019-05-14 NOTE — ROUTINE PROCESS
Staff observed patient grimacing and gave x 2 Oxycodone at 1800, pt had visitor and did not display any s/s pain during recheck at 1910.

## 2019-05-15 LAB
GLUCOSE BLD STRIP.AUTO-MCNC: 112 MG/DL (ref 70–110)
GLUCOSE BLD STRIP.AUTO-MCNC: 135 MG/DL (ref 70–110)
GLUCOSE BLD STRIP.AUTO-MCNC: 152 MG/DL (ref 70–110)
GLUCOSE BLD STRIP.AUTO-MCNC: 187 MG/DL (ref 70–110)

## 2019-05-15 PROCEDURE — 74011250637 HC RX REV CODE- 250/637: Performed by: NURSE PRACTITIONER

## 2019-05-15 PROCEDURE — 82962 GLUCOSE BLOOD TEST: CPT

## 2019-05-15 PROCEDURE — 74011636637 HC RX REV CODE- 636/637: Performed by: INTERNAL MEDICINE

## 2019-05-15 PROCEDURE — 74011250637 HC RX REV CODE- 250/637: Performed by: INTERNAL MEDICINE

## 2019-05-15 RX ADMIN — PANTOPRAZOLE SODIUM 40 MG: 40 TABLET, DELAYED RELEASE ORAL at 07:30

## 2019-05-15 RX ADMIN — OXYCODONE HYDROCHLORIDE AND ACETAMINOPHEN 1 TABLET: 5; 325 TABLET ORAL at 04:15

## 2019-05-15 RX ADMIN — CHOLECALCIFEROL (VITAMIN D3) 25 MCG (1,000 UNIT) TABLET 1000 UNITS: at 09:32

## 2019-05-15 RX ADMIN — MAGNESIUM HYDROXIDE 30 ML: 400 SUSPENSION ORAL at 04:15

## 2019-05-15 RX ADMIN — METFORMIN HYDROCHLORIDE 1000 MG: 500 TABLET ORAL at 17:36

## 2019-05-15 RX ADMIN — WARFARIN SODIUM 2 MG: 2 TABLET ORAL at 17:36

## 2019-05-15 RX ADMIN — LISINOPRIL 10 MG: 10 TABLET ORAL at 09:32

## 2019-05-15 RX ADMIN — DOCUSATE SODIUM 100 MG: 100 CAPSULE, LIQUID FILLED ORAL at 09:31

## 2019-05-15 RX ADMIN — OXYCODONE HYDROCHLORIDE AND ACETAMINOPHEN 2 TABLET: 10; 325 TABLET ORAL at 17:36

## 2019-05-15 RX ADMIN — SIMVASTATIN 20 MG: 20 TABLET, FILM COATED ORAL at 21:17

## 2019-05-15 RX ADMIN — POLYETHYLENE GLYCOL 3350 17 G: 17 POWDER, FOR SOLUTION ORAL at 09:31

## 2019-05-15 RX ADMIN — INSULIN LISPRO 2 UNITS: 100 INJECTION, SOLUTION INTRAVENOUS; SUBCUTANEOUS at 11:56

## 2019-05-15 RX ADMIN — METFORMIN HYDROCHLORIDE 1000 MG: 500 TABLET ORAL at 09:32

## 2019-05-15 RX ADMIN — OXYCODONE HYDROCHLORIDE AND ACETAMINOPHEN 2 TABLET: 10; 325 TABLET ORAL at 09:32

## 2019-05-15 NOTE — ROUTINE PROCESS
Bedside, Verbal and Written shift change report given to conrad jimenez (oncoming nurse) by Mazin Salomon (offgoing nurse). Report included the following information SBAR, Intake/Output and MAR.

## 2019-05-15 NOTE — PROGRESS NOTES
[facility]   PHYSICAL THERAPY DAILY TREATMENT NOTE      Patient: Sj Gonzalez (15 y.o. male)               Date: 5/15/2019    Physician: Krupa Gomez MD  Primary Diagnosis: rt hip fracture          Treatment Diagnosis  Treatment Diagnosis: need for assist with self care  Treatment Diagnosis 2: muscle weakness  Precautions: Fall, TTWB(TTWB RLE)  Vital Signs  Vital Signs  Pulse (Heart Rate): 70  O2 Sat (%): 96 %  BP: 110/68  MAP (Calculated): 82  Cognitive Status:  Mental Status  Neurologic State: Confused  Orientation Level: Oriented to person;Oriented to situation  Cognition: Impulsive;Poor safety awareness  Pain  Bed Mobility Training  Bed Mobility Training  Supine to Sit: Moderate assistance  Scooting: Minimum assistance  Interventions: Safety awareness training;Verbal cues; Tactile cues  Balance  Sitting: With support  Sitting - Static: Fair (occasional)  Sitting - Dynamic: Fair (occasional)  Standing: With support  Standing - Static: Fair  Standing - Dynamic : Fair  Transfer Training  Transfer Training  Sit to Stand: Minimum assistance  Stand to Sit: Minimum assistance; Additional time  Sit to Stand: Minimum assistance  Gait Training  Gait  Base of Support: Center of gravity altered;Narrowed  Step Length: Right shortened;Left shortened  Gait Abnormalities: Antalgic;Decreased step clearance  Ambulation - Level of Assistance: Minimal assistance  Distance (ft): 33 Feet (ft)  Assistive Device: Gait belt;Walker, rolling  Interventions: Tactile cues; Safety awareness training;Verbal cues  Gait Abnormalities: Antalgic;Decreased step clearance  Right Side Weight Bearing: Toe touch   With 1 turns. Treatment: Pt presented supine in bed. Pt awake and reported no pain at this time. Pt agreeable to OOB therapy session. Pt required additional time, effort and two attempts to achieve supine>sit. Pt c/o of L hip and mid back pain with supine>sit. Upon sitting EOB, pt reported pain had subsided.  Pt stated \"I just hurts when I move like that\". Pt sat EOB unsupported for ~3-4' with SBA and no noted LOB. Sit>stand from elevated EOB with Min A and cues for TTWB on R LE. Pt initially with slight posterior lean, then progressed to fair balance with Min A for stand-step transfer EOB>w/c with RW, constant cues for TTWB on R LE. Seated B LE TE's rendered to promote strength, flexibility and overall functional capacity for ease of mobility: HR/TR, LAQ, hip flexion, ball squeezes, resisted hip abd (green band) 2x15, verbal and visual cues throughout for proper form and to stay on task. Sit>stand from w/c with Mod A and additional time as pt again reported L hip, R groin and mid to L lower-mod back pain. Gait training rendered with close w/c follow and cues for TTWB on R. Pt demonstrated good TTWB compliance x33ft with one slight posterior LOB that required Min A from therapist to minimize risk of falls. Pt remained sitting in w/c in dining area at end of session, NSG made aware. Patient/Caregiver Education:   Pt /Caregiver Education on safety and fall prevention. Pt educated on importance of using call bell and not attempting to get up unassisted. Due to confusion, pt required reminders. ASSESSMENT:  Patient continues to benefit from Skilled PT services to improve bed mobility, sit<>stand, transfers, strength, balance, and gait. Progression toward goals:  ?      Improving appropriately and progressing toward goals  ? Improving slowly and progressing toward goals  ? Not making progress toward goals and plan of care will be adjusted     Treatment session: 49 minutes.   Therapist:   Esau Amado PTA,          5/15/2019

## 2019-05-15 NOTE — PROGRESS NOTES
Problem: Self Care Deficits Care Plan (Adult)  Goal: *Acute Goals and Plan of Care (Insert Text)  Description  -CLOF: OCCUPATIONAL THERAPY SHORT TERM GOALS        Updated 5/13/19    1. Patient will perform Upper body ADL's without adaptive equipment with modified independence. 2.  Patient will perform Lower body ADL's with adaptive equipment with moderate assistance. 3.  Patient will perform toileting task with moderate assistance  with Good safety to reduce falls risk. 4.  Patient will perform bedside commode transfers with [address] and contact guard assistance while adhering to RLE TTWB. 5.  Patient will perform standing static/dynamic balance activities for improved ADL function with minimal assistance and Fair+ balance and safety awareness while adhering to RLE TTWB. 6.  Patient will improve Barthel index scores to atleast 28344 to improve functional mobility. 7.  Patient will utilize energy conservation techniques during functional activities with minimal verbal cues. Updated 5/6/19    1. Patient will perform Upper body ADL's without adaptive equipment with modified independence. -CLOF: bathing w/ SBA, dressing w/ Min A  2. Patient will perform Lower body ADL's with adaptive equipment with moderate assistance. -CLOF: bathing w/ Mod A , dressing w/ Max A using AE: reacher  3. Patient will perform toileting task with moderate assistance  with Good safety to reduce falls risk. -CLOF: Mod I bladder mgmt using urinal, Dep clothing mgmt and bowel mgmt  4. Patient will perform bedside commode transfers with Rolling Walker and minimal assistance while adhering to RLE TTWB. - CLOF: Min A w/ RW adhering to R LE TTWB, upgrade  5. Patient will perform standing static/dynamic balance activities for improved ADL function with minimal assistance and Fair+ balance and safety awareness while adhering to RLE TTWB.-CLOF: Fair- static, Fair- dynamic w/ RW and CGA - Min A  6.   Patient will improve Barthel index scores to atleast 90349 to improve functional mobility. - CLOF: /100  7. Patient will utilize energy conservation techniques during functional activities with minimal verbal cues. - CLOF: Max verbal cues     Initiated 4/30/2019 and to be accomplished within 2 Week(s)    1. Patient will perform Upper body ADL's without adaptive equipment with modified independence. -CLOF: bathing and dressing w/ set up  2. Patient will perform Lower body ADL's with adaptive equipment with moderate assistance. -CLOF: bathing and dressing w/ Max A  3. Patient will perform toileting task with moderate assistance  with Good safety to reduce falls risk. -CLOF: Mod I w/ bladder mgmt using urinal. Dep bowel mgmt, Max A clothing mgmt  4. Patient will perform bedside commode transfers with Rolling Walker and minimal assistance while adhering to RLE TTWB.-CLOF: CGA w/ RW while adhering to RLE TTWB  5. Patient will perform standing static/dynamic balance activities for improved ADL function with minimal assistance and Fair+ balance and safety awareness while adhering to RLE TTWB.-CLOF: Karmen static and Fair dynamic w/ RW & CGA while adhering to RLE TTWB  6. Patient will improve Barthel index scores to atleast 00165 to improve functional mobility. -CLOF: 55/100  7. Patient will utilize energy conservation techniques during functional activities with minimal verbal cues. -CLOF:  Max verbal cues    OCCUPATIONAL THERAPY LONG TERM GOALS   Initiated 4/30/2019 and to be accomplished within 4 Week(s)    1. Patient will perform ADL's with adaptive equipment as needed with modified independence. 2.  Patient will perform toileting task with modified independence with Good safety to reduce falls risk. 3.  Patient will perform all functional transfers utilizing least restrictive device and durable medical equipment as needed with modified independence and Good balance and safety awareness.   4.  Patient will perform standing static/dynamic activity for improved ADL function with modified independence and Good balance and safety awareness. 5.  Patient will improve Barthel index score to 95/100 to improve independence with mobility. 6. Patient will perform UE HEP with Modified [place] to increase BUE strength for continued participation in ADLs. Therapist: Ashleigh Barrios    4/30/2019                Outcome: Progressing Towards Goal  [facility]   OCCUPATIONAL THERAPY DAILY TREATMENT NOTE      Patient: Ivette Abarca (75 y.o. male)       Date: 5/15/2019  Attending Physician: Daily Valdez MD  Primary Diagnosis: rt hip fracture    Treatment Diagnosis  Treatment Diagnosis: need for assist with self care  Treatment Diagnosis 2: muscle weakness   Precautions : Precautions at Admission: Fall, TTWB(TTWB RLE)  Vital Signs:  Vital Signs  Temp: 97 °F (36.1 °C)  Temp Source: Oral  Pulse (Heart Rate): 95  Resp Rate: 18  O2 Sat (%): 96 %  BP: 112/70  MAP (Calculated): 79     Cognitive Status:  Mental Status  Neurologic State: Alert;Confused  Orientation Level: Oriented to person;Oriented to place  Cognition: Impulsive;Poor safety awareness  Pain:  Pain Screen  Pain Scale 1: Numeric (0 - 10)  Pain Intensity 1: 6  Pain Location 1: Hip  Pain Orientation 1: Right;Left  Pain Description 1: Aching  Pain Intervention(s) 1: Medication (see MAR); Distraction; Environmental changes  Pain Scale 1: Numeric (0 - 10)  Gross Assessment:     Coordination:     Bed Mobility:  Bed Mobility  Supine to Sit: Moderate assistance  Scooting: Minimum assistance  Transfers:  Functional Transfers  Sit to Stand: Minimum assistance  Stand to Sit: Minimum assistance; Additional time     Balance:  Balance  Sitting: With support  Sitting - Static: Fair (occasional)  Sitting - Dynamic: Fair (occasional)  Standing: With support  Standing - Static: Fair  Standing - Dynamic : Fair  ADL Self Care:     ADL Intervention:       Second tx session:  Toilet transfer w/ spouse present for caregiver education: max verbal cues for correct hand placement for sit <-> stand and maintain RLE TTWB w/ Min A,  pt requiring max verbal cues to maintain RLE TTWB with poor carryover for w/c <-> toilet w/ RW, 3 in 1 commode over toilet for raised height and wall grab bar on left side, Dep clothing mgmt, pt completing voiding of bladder. Patient's spouse reports she has osteoporosis with hx of back injuries, spouse reports her son/Parvez will not be readily available to assist pt upon d/c home, as he may be moving, spouse reports she does not feel safe assisting pt with functional transfers at this time, next Ortho appt scheduled for May 28th-nursing/DON and rehab manager notified. DME recommended with visual aides provided to spouse for DME supplier, 3 in 1 commode and grab bar installation on wall next to toilet. Therapeutic Activities:   First tx session: Patient presents sitting on edge of bed, spouse present and reports she assisted him supine to sit edge of bed in prep to eat lunch, pt noted with Fair+ static sitting balance. Patient's spouse inquiring of pt having a bed alarm d/t recent falls and poor safety awareness, nursing notified and spouse notified nursing to follow up. Therapist to f/u for OT tx session following completion of lunch meal.    Second tx session: Functional transfer w/ RW and spouse present for caregiver education, max verbal cues for correct hand placement for sit <-> stand and maintain RLE TTWB w/ Min A from edge of bed -> w/c w/ poor carryover for RLE TTWB adherence. Patient/Caregiver Education:    Pt. Dante Holley Education on DME recommended, see above e.g. 3 in 1 commode and installation of wall grab bar next to toilet was provided to increase safety for d/c home.       ASSESSMENT:  Patient continues to demonstrate the need for skilled Occupational Therapy services to improve grooming, bathing, upper body dressing, lower body dressing, toileting and toilet transfer  Progression toward goals:  ?      Improving appropriately and progressing toward goals  ? Improving slowly and progressing toward goals  ?       Not making progress toward goals and plan of care will be adjusted     Treatment session:   48 minutes    Therapist:    Bro Zheng,  5/15/2019

## 2019-05-16 LAB
GLUCOSE BLD STRIP.AUTO-MCNC: 146 MG/DL (ref 70–110)
GLUCOSE BLD STRIP.AUTO-MCNC: 160 MG/DL (ref 70–110)
GLUCOSE BLD STRIP.AUTO-MCNC: 284 MG/DL (ref 70–110)
INR PPP: 3.5 (ref 0.8–1.2)
PROTHROMBIN TIME: 35.3 SEC (ref 11.5–15.2)

## 2019-05-16 PROCEDURE — 74011250637 HC RX REV CODE- 250/637: Performed by: NURSE PRACTITIONER

## 2019-05-16 PROCEDURE — 74011636637 HC RX REV CODE- 636/637: Performed by: INTERNAL MEDICINE

## 2019-05-16 PROCEDURE — 85610 PROTHROMBIN TIME: CPT

## 2019-05-16 PROCEDURE — 36415 COLL VENOUS BLD VENIPUNCTURE: CPT

## 2019-05-16 PROCEDURE — 74011250637 HC RX REV CODE- 250/637: Performed by: INTERNAL MEDICINE

## 2019-05-16 PROCEDURE — 82962 GLUCOSE BLOOD TEST: CPT

## 2019-05-16 RX ADMIN — OXYCODONE HYDROCHLORIDE AND ACETAMINOPHEN 2 TABLET: 10; 325 TABLET ORAL at 09:14

## 2019-05-16 RX ADMIN — INSULIN LISPRO 6 UNITS: 100 INJECTION, SOLUTION INTRAVENOUS; SUBCUTANEOUS at 13:24

## 2019-05-16 RX ADMIN — LISINOPRIL 10 MG: 10 TABLET ORAL at 09:14

## 2019-05-16 RX ADMIN — WARFARIN SODIUM 2 MG: 2 TABLET ORAL at 17:59

## 2019-05-16 RX ADMIN — PANTOPRAZOLE SODIUM 40 MG: 40 TABLET, DELAYED RELEASE ORAL at 07:30

## 2019-05-16 RX ADMIN — MAGNESIUM HYDROXIDE 30 ML: 400 SUSPENSION ORAL at 17:59

## 2019-05-16 RX ADMIN — INSULIN LISPRO 2 UNITS: 100 INJECTION, SOLUTION INTRAVENOUS; SUBCUTANEOUS at 08:52

## 2019-05-16 RX ADMIN — CHOLECALCIFEROL (VITAMIN D3) 25 MCG (1,000 UNIT) TABLET 1000 UNITS: at 09:13

## 2019-05-16 RX ADMIN — OXYCODONE HYDROCHLORIDE AND ACETAMINOPHEN 2 TABLET: 10; 325 TABLET ORAL at 21:50

## 2019-05-16 RX ADMIN — POLYETHYLENE GLYCOL 3350 17 G: 17 POWDER, FOR SOLUTION ORAL at 09:16

## 2019-05-16 RX ADMIN — SIMVASTATIN 20 MG: 20 TABLET, FILM COATED ORAL at 21:50

## 2019-05-16 RX ADMIN — METFORMIN HYDROCHLORIDE 1000 MG: 500 TABLET ORAL at 17:59

## 2019-05-16 RX ADMIN — DOCUSATE SODIUM 100 MG: 100 CAPSULE, LIQUID FILLED ORAL at 09:13

## 2019-05-16 RX ADMIN — METFORMIN HYDROCHLORIDE 1000 MG: 500 TABLET ORAL at 09:13

## 2019-05-16 NOTE — PROGRESS NOTES
Problem: Self Care Deficits Care Plan (Adult)  Goal: *Acute Goals and Plan of Care (Insert Text)  Description  -CLOF: OCCUPATIONAL THERAPY SHORT TERM GOALS        Updated 5/13/19    1. Patient will perform Upper body ADL's without adaptive equipment with modified independence. 2.  Patient will perform Lower body ADL's with adaptive equipment with moderate assistance. 3.  Patient will perform toileting task with moderate assistance  with Good safety to reduce falls risk. 4.  Patient will perform bedside commode transfers with Lylia Constance and contact guard assistance while adhering to RLE TTWB. 5.  Patient will perform standing static/dynamic balance activities for improved ADL function with minimal assistance and Fair+ balance and safety awareness while adhering to RLE TTWB. 6.  Patient will improve Barthel index scores to atleast 52109 to improve functional mobility. 7.  Patient will utilize energy conservation techniques during functional activities with minimal verbal cues. Updated 5/6/19    1. Patient will perform Upper body ADL's without adaptive equipment with modified independence. -CLOF: bathing w/ SBA, dressing w/ Min A  2. Patient will perform Lower body ADL's with adaptive equipment with moderate assistance. -CLOF: bathing w/ Mod A , dressing w/ Max A using AE: reacher  3. Patient will perform toileting task with moderate assistance  with Good safety to reduce falls risk. -CLOF: Mod I bladder mgmt using urinal, Dep clothing mgmt and bowel mgmt  4. Patient will perform bedside commode transfers with Rolling Walker and minimal assistance while adhering to RLE TTWB. - CLOF: Min A w/ RW adhering to R LE TTWB, upgrade  5. Patient will perform standing static/dynamic balance activities for improved ADL function with minimal assistance and Fair+ balance and safety awareness while adhering to RLE TTWB.-CLOF: Fair- static, Fair- dynamic w/ RW and CGA - Min A  6.   Patient will improve Barthel index scores to atleast 58840 to improve functional mobility. - CLOF: /100  7. Patient will utilize energy conservation techniques during functional activities with minimal verbal cues. - CLOF: Max verbal cues     Initiated 4/30/2019 and to be accomplished within 2 Week(s)    1. Patient will perform Upper body ADL's without adaptive equipment with modified independence. -CLOF: bathing and dressing w/ set up  2. Patient will perform Lower body ADL's with adaptive equipment with moderate assistance. -CLOF: bathing and dressing w/ Max A  3. Patient will perform toileting task with moderate assistance  with Good safety to reduce falls risk. -CLOF: Mod I w/ bladder mgmt using urinal. Dep bowel mgmt, Max A clothing mgmt  4. Patient will perform bedside commode transfers with Rolling Walker and minimal assistance while adhering to RLE TTWB.-CLOF: CGA w/ RW while adhering to RLE TTWB  5. Patient will perform standing static/dynamic balance activities for improved ADL function with minimal assistance and Fair+ balance and safety awareness while adhering to RLE TTWB.-CLOF: Karmen static and Fair dynamic w/ RW & CGA while adhering to RLE TTWB  6. Patient will improve Barthel index scores to atleast 15499 to improve functional mobility. -CLOF: 55/100  7. Patient will utilize energy conservation techniques during functional activities with minimal verbal cues. -CLOF:  Max verbal cues    OCCUPATIONAL THERAPY LONG TERM GOALS   Initiated 4/30/2019 and to be accomplished within 4 Week(s)    1. Patient will perform ADL's with adaptive equipment as needed with modified independence. 2.  Patient will perform toileting task with modified independence with Good safety to reduce falls risk. 3.  Patient will perform all functional transfers utilizing least restrictive device and durable medical equipment as needed with modified independence and Good balance and safety awareness.   4.  Patient will perform standing static/dynamic activity for improved ADL function with modified independence and Good balance and safety awareness. 5.  Patient will improve Barthel index score to 95/100 to improve independence with mobility. 6. Patient will perform UE HEP with Modified [place] to increase BUE strength for continued participation in ADLs. Therapist: Eulalia Romano    4/30/2019                Outcome: Progressing Towards Goal   [facility]   OCCUPATIONAL THERAPY DAILY TREATMENT NOTE      Patient: Grace Valverde (13 y.o. male)       Date: 5/16/2019  Attending Physician: Kristin Schwab MD  Primary Diagnosis: rt hip fracture    Treatment Diagnosis  Treatment Diagnosis: need for assist with self care  Treatment Diagnosis 2: muscle weakness   Precautions : Precautions at Admission: Fall, TTWB(TTWB RLE)  Vital Signs:  Vital Signs  Pulse (Heart Rate): 74  BP: 126/68     Cognitive Status:  Mental Status  Neurologic State: Alert  Orientation Level: Oriented to person  Pain:        Gross Assessment:     Coordination:     Bed Mobility:  Bed Mobility  Supine to Sit: Moderate assistance  Scooting: Contact guard assistance; Additional time  Transfers:  Functional Transfers  Sit to Stand: Minimum assistance  Stand to Sit: Minimum assistance     Balance:  Balance  Sitting: With support  Sitting - Static: Fair (occasional)  Sitting - Dynamic: Fair (occasional)  Standing: With support  Standing - Static: Fair  Standing - Dynamic : Fair(((-)))  ADL Self Care:     ADL Intervention:  Pt performed LB dressing, doffing/donning non-skid sock while seated w/c level. Educated pt on equipment available to use for greater independence with task. Therapeutic Activities:  Pt participated in seated activity to challenge dynamic sitting balance and to increase core strength. Pt used elevated pegboard to translate pattern from card. Pt reached forward, outside PINA to place pegs, alternating between LUE and RUE to place pieces.  To increase challenge, 1# weights were attached to wrist. Pt required extra time to complete 2/2 fatigue. Therapeutic Exercises:   Pt participated in cardio endurance exercise to increase activity tolerance. Pt used arm cycle for 10 minutes. For increased challenge, 1# weights were attached to wrist.  Patient/Caregiver Education:    Pt. Hunter Vega Education on weight bearing status was provided to pt. ASSESSMENT:  Patient continues to demonstrate the need for skilled Occupational Therapy services to improve grooming, bathing, upper body dressing, lower body dressing, toileting and toilet transfer  Progression toward goals:  ?      Improving appropriately and progressing toward goals  ? Improving slowly and progressing toward goals  ?       Not making progress toward goals and plan of care will be adjusted     Treatment session:   47 minutes    Therapist:    Hildred Epley, COTA/L,  5/16/2019

## 2019-05-16 NOTE — PROGRESS NOTES
Patient tried to go to the bathroom without calling. Tripped his bed alarm several times. Pt re-educated about safety.

## 2019-05-16 NOTE — PROGRESS NOTES
[facility]    Daily progress Note    Patient: Louis Meade MRN: [id number]  CSN: [id number]    YOB: 1931  Age: 80 y.o. Sex: male    DOA: 4/29/2019 LOS:  LOS: 17 days                    Subjective:     CC: Coumadin  Monitoring     Patient with multiple hx including AFIB,he is on Coumadin 2 mg, with Mon and Thursday pt/inr monitoring. Patient inr today is 3.5 , no active bleeding noted or reported. VSS stable. No NVD, fever or chills. PAST MEDICAL HX: Alzheimer's Disease, HTN AFIB, DM type 2      PAST SURGICAL Hx: hernia repair     SOCIAL Hx:      ALLERGIES: NKDA     ROSCONST: Fever, weight loss, fatigue or chills  HEENT: Recent changes in vision, vertigo  CV: Chest pain, palpitations, edema and varicosities  RESP: Cough, shortness of breath, wheezing  GI: Nausea, vomiting, abdominal pain, change in bowel habits,  : Hematuria, dysuria, frequency  MS: Weakness, right hip sorness  LYMPH/HEME: Anemia, bruising and history of blood transfusions  INTEG: Dermatitis, abnormal moles  NEURO: Dizziness, headache, fainting, seizures and stroke  PSYCH: Anxiety and depression       Objective:      Visit Vitals  /68   Pulse 74   Temp 97 °F (36.1 °C)   Resp 18   Ht 5' 9\" (1.753 m)   Wt 61.6 kg (135 lb 12.8 oz)   SpO2 96%   BMI 20.05 kg/m²       Physical Exam:  General appearance: alert, cooperative, no distress, appears stated age  HEENT: negative  Neck: supple, symmetrical, trachea midline, no adenopathy, thyroid: not enlarged, symmetric, no tenderness/mass/nodules, no carotid bruit and no JVD  Lungs: clear to auscultation bilaterally  Heart: regular rate and rhythm, S1, S2 normal, no murmur, click, rub or gallop  Abdomen: soft, non-tender. Bowel sounds normal. No masses,  no organomegaly  Pulses: 2+ and symmetric  Skin: Skin color, texture, turgor normal. No rashes or lesions      Intake and Output:  Current Shift:  No intake/output data recorded.   Last three shifts:  No intake/output data recorded.     Recent Results (from the past 24 hour(s))   GLUCOSE, POC    Collection Time: 05/15/19 11:47 AM   Result Value Ref Range    Glucose (POC) 187 (H) 70 - 110 mg/dL   GLUCOSE, POC    Collection Time: 05/15/19  4:31 PM   Result Value Ref Range    Glucose (POC) 112 (H) 70 - 110 mg/dL   GLUCOSE, POC    Collection Time: 05/15/19 10:24 PM   Result Value Ref Range    Glucose (POC) 152 (H) 70 - 110 mg/dL   PROTHROMBIN TIME + INR    Collection Time: 05/16/19  1:30 AM   Result Value Ref Range    Prothrombin time 35.3 (H) 11.5 - 15.2 sec    INR 3.5 (H) 0.8 - 1.2     GLUCOSE, POC    Collection Time: 05/16/19  6:10 AM   Result Value Ref Range    Glucose (POC) 160 (H) 70 - 110 mg/dL         Current Facility-Administered Medications:     docusate sodium (COLACE) capsule 100 mg, 100 mg, Oral, DAILY, Nelly Hansen NP, 100 mg at 05/16/19 0913    polyethylene glycol (MIRALAX) packet 17 g, 17 g, Oral, DAILY, Andres BOLDEN NP, 17 g at 05/16/19 0916    [facility] ANESTHESIA, , Other, PRN, Nkechi Auguste MD    [facility] EMERGENCY/STAT BOX, , Other, PRN, Nkechi Auguste MD    oxyCODONE-acetaminophen (PERCOCET) 5-325 mg per tablet 1 Tab, 1 Tab, Oral, Q4H PRN, Nkechi Auguste MD, 1 Tab at 05/15/19 0415    oxyCODONE-acetaminophen (PERCOCET 10)  mg per tablet 2 Tab, 2 Tab, Oral, Q4H PRN, Nkechi Auguste MD, 2 Tab at 05/16/19 0914    warfarin (COUMADIN) tablet 2 mg, 2 mg, Oral, QPM, Nkechi Auguste MD, 2 mg at 05/15/19 1736    cholecalciferol (VITAMIN D3) tablet 1,000 Units, 1,000 Units, Oral, DAILY, Latosha Xiao MD, 1,000 Units at 05/16/19 0913    lisinopril (PRINIVIL, ZESTRIL) tablet 10 mg, 10 mg, Oral, DAILY, Latosha Xiao MD, 10 mg at 05/16/19 0914    metFORMIN (GLUCOPHAGE) tablet 1,000 mg, 1,000 mg, Oral, BID WITH MEALS, Latosha Xiao MD, 1,000 mg at 05/16/19 0913    pantoprazole (PROTONIX) tablet 40 mg, 40 mg, Oral, ACB, Latosha Xiao MD, 40 mg at 05/16/19 0730    insulin lispro (HUMALOG) injection, , SubCUTAneous, AC&HS, Nadine Ren MD, 2 Units at 05/16/19 0852    flaxseed oil cap 1,000 mg (Patient Supplied), 1,000 mg, Oral, DAILY, Nadine Ren MD, Stopped at 05/12/19 0900    WARFARIN INFORMATION NOTE (COUMADIN), , Other, PRN, Nadine Ren MD    simvastatin (ZOCOR) tablet 20 mg, 20 mg, Oral, QHS, Latosha Xiao MD, 20 mg at 05/15/19 2117    magnesium hydroxide (MILK OF MAGNESIA) 400 mg/5 mL oral suspension 30 mL, 30 mL, Oral, DAILY PRN, Latosha Xiao MD, 30 mL at 05/15/19 0415    bisacodyl (DULCOLAX) suppository 10 mg, 10 mg, Rectal, DAILY PRN, Nadine Ren MD, 10 mg at 05/14/19 0240    Lab Results   Component Value Date/Time    Glucose 131 (H) 05/13/2019 04:20 AM    Glucose 116 (H) 05/06/2019 05:05 AM    Glucose 212 (H) 04/29/2019 06:55 PM    Glucose 181 (H) 04/27/2019 04:10 AM    Glucose 95 04/26/2019 05:44 AM        Assessment/Plan   INR elevation: inr 3.5, no active bleeding   AFIB:  OAC with coumadin   Continue PT/OT   Vladimir Beebe NP  5/16/2019, 9:27 AM

## 2019-05-16 NOTE — PROGRESS NOTES
[address] [facility]   PHYSICAL THERAPY DAILY TREATMENT NOTE      Patient: Author Lane (40 y.o. male)               Date: 5/16/2019    Physician: Diane Gonzales MD  Primary Diagnosis: rt hip fracture          Treatment Diagnosis  Treatment Diagnosis: need for assist with self care  Treatment Diagnosis 2: muscle weakness  Precautions: Fall, TTWB(TTWB RLE)  Vital Signs  Vital Signs  Pulse (Heart Rate): 74  BP: 126/68  Cognitive Status:  Mental Status  Neurologic State: Alert;Confused  Orientation Level: Oriented to person  Cognition: Impulsive;Poor safety awareness  Pain  Pain Screen  Pain Scale 1: Visual  Pain Intensity 1: 0  Patient Stated Pain Goal: 0  Pain Reassessment 1: Patient sleeping  Bed Mobility Training  Bed Mobility Training  Supine to Sit: Moderate assistance  Scooting: Contact guard assistance; Additional time  Interventions: Safety awareness training;Verbal cues  Balance  Sitting: With support  Sitting - Static: Fair (occasional)  Sitting - Dynamic: Fair (occasional)  Standing: With support  Standing - Static: Fair  Standing - Dynamic : Fair(((-)))  Transfer Training  Transfer Training  Sit to Stand: Minimum assistance  Stand to Sit: Minimum assistance  Sit to Stand: Minimum assistance  Gait Training  Gait  Base of Support: Center of gravity altered;Narrowed  Step Length: Right shortened;Left shortened  Gait Abnormalities: Antalgic;Decreased step clearance  Ambulation - Level of Assistance: Minimal assistance  Distance (ft): 10 Feet (ft)(x2)  Assistive Device: Gait belt;Walker, rolling  Interventions: Safety awareness training;Verbal cues  Gait Abnormalities: Antalgic;Decreased step clearance  Right Side Weight Bearing: Toe touch   With 1 turns. Treatment: Session One: Pt presented supine in bed. Pt required additional time and effort with Mod A for supine>sit. Pt sat EOB unsupported for ~3-4' with no noted LOB. Sit>stand throughout session with Min A, cues for hand placement and TTWB. Pt ambulated ~10ft to toilet with RW to include doorway negotiation. Pt required several cues for TTWB compliance and to slowdown for safety. Pt stated \"I have to hurry, I have to go\". Pt stood with B UE support and CGA for self care with continual cues for TTWB. Attempted standing at sink to allow pt to wash hands. Due to increased sway, pt advised to sit for safety. Pt ambulated an additional 10ft back to w/c. Pt remained sitting up in w/c at bedside for breakfast, call bell at side. Patient/Caregiver Education:   Pt /Caregiver Education on safety and fall prevention. Pt provided with continual education on use of call bell and not attempting to get up unassisted as pt has had prior falls. Pt verbalized understanding, call bell left at pt's side. ASSESSMENT:  Patient continues to benefit from Skilled PT services to improve bed mobility, sit<>stand, transfers, strength, balance and gait. Progression toward goals:  ?      Improving appropriately and progressing toward goals  ? Improving slowly and progressing toward goals  ? Not making progress toward goals and plan of care will be adjusted     Treatment session: 48 minutes.   Therapist:   Huey Barker PTA,          5/16/2019

## 2019-05-16 NOTE — ROUTINE PROCESS
Verbal and Written shift change report given to Luis Huff LPN (oncoming nurse) by Akira Villeda (offgoing nurse). Report included the following information SBAR, MAR, KARDEX.

## 2019-05-17 ENCOUNTER — PATIENT OUTREACH (OUTPATIENT)
Dept: CASE MANAGEMENT | Age: 84
End: 2019-05-17

## 2019-05-17 LAB
GLUCOSE BLD STRIP.AUTO-MCNC: 130 MG/DL (ref 70–110)
GLUCOSE BLD STRIP.AUTO-MCNC: 146 MG/DL (ref 70–110)
GLUCOSE BLD STRIP.AUTO-MCNC: 190 MG/DL (ref 70–110)
GLUCOSE BLD STRIP.AUTO-MCNC: 230 MG/DL (ref 70–110)
GLUCOSE BLD STRIP.AUTO-MCNC: 238 MG/DL (ref 70–110)
INR PPP: 3 (ref 0.8–1.2)
PROTHROMBIN TIME: 31.3 SEC (ref 11.5–15.2)

## 2019-05-17 PROCEDURE — 74011250637 HC RX REV CODE- 250/637: Performed by: NURSE PRACTITIONER

## 2019-05-17 PROCEDURE — 85610 PROTHROMBIN TIME: CPT

## 2019-05-17 PROCEDURE — 36415 COLL VENOUS BLD VENIPUNCTURE: CPT

## 2019-05-17 PROCEDURE — 82962 GLUCOSE BLOOD TEST: CPT

## 2019-05-17 PROCEDURE — 74011636637 HC RX REV CODE- 636/637: Performed by: INTERNAL MEDICINE

## 2019-05-17 PROCEDURE — 74011250637 HC RX REV CODE- 250/637: Performed by: INTERNAL MEDICINE

## 2019-05-17 RX ADMIN — OXYCODONE HYDROCHLORIDE AND ACETAMINOPHEN 1 TABLET: 5; 325 TABLET ORAL at 21:46

## 2019-05-17 RX ADMIN — CHOLECALCIFEROL (VITAMIN D3) 25 MCG (1,000 UNIT) TABLET 1000 UNITS: at 08:03

## 2019-05-17 RX ADMIN — PANTOPRAZOLE SODIUM 40 MG: 40 TABLET, DELAYED RELEASE ORAL at 08:04

## 2019-05-17 RX ADMIN — OXYCODONE HYDROCHLORIDE AND ACETAMINOPHEN 2 TABLET: 10; 325 TABLET ORAL at 05:13

## 2019-05-17 RX ADMIN — SIMVASTATIN 20 MG: 20 TABLET, FILM COATED ORAL at 21:03

## 2019-05-17 RX ADMIN — INSULIN LISPRO 4 UNITS: 100 INJECTION, SOLUTION INTRAVENOUS; SUBCUTANEOUS at 11:30

## 2019-05-17 RX ADMIN — METFORMIN HYDROCHLORIDE 1000 MG: 500 TABLET ORAL at 08:03

## 2019-05-17 RX ADMIN — DOCUSATE SODIUM 100 MG: 100 CAPSULE, LIQUID FILLED ORAL at 08:03

## 2019-05-17 RX ADMIN — METFORMIN HYDROCHLORIDE 1000 MG: 500 TABLET ORAL at 17:59

## 2019-05-17 RX ADMIN — OXYCODONE HYDROCHLORIDE AND ACETAMINOPHEN 1 TABLET: 5; 325 TABLET ORAL at 12:40

## 2019-05-17 RX ADMIN — INSULIN LISPRO 2 UNITS: 100 INJECTION, SOLUTION INTRAVENOUS; SUBCUTANEOUS at 21:03

## 2019-05-17 RX ADMIN — LISINOPRIL 10 MG: 10 TABLET ORAL at 08:03

## 2019-05-17 RX ADMIN — WARFARIN SODIUM 2 MG: 2 TABLET ORAL at 17:59

## 2019-05-17 NOTE — PROGRESS NOTES
[address] [facility]   OCCUPATIONAL THERAPY DAILY TREATMENT NOTE      Patient: Clover Kelly (48 y.o. male)       Date: 5/17/2019  Attending Physician: Suzy Mayo MD  Primary Diagnosis: rt hip fracture    Treatment Diagnosis  Treatment Diagnosis: need for assist with self care  Treatment Diagnosis 2: muscle weakness   Precautions : Precautions at Admission: Fall, TTWB(TTWB RLE)    Cognitive Status:  Mental Status  Neurologic State: Alert;Confused  Orientation Level: Oriented to place(Oriented to person, day )  Cognition: Poor safety awareness  Pain:   No report of pain  Gross Assessment:   WFL  Coordination:   WFL  Transfers:  Functional Transfers  Sit to Stand: Minimum assistance  Stand to Sit: Contact guard assistance  Balance:  Balance  Sitting: With support  Sitting - Static: Fair (occasional)  Sitting - Dynamic: Fair (occasional)  Standing: With support  Standing - Static: Fair  Standing - Dynamic : Fair  ADL Self Care:   Pt reported waiting for wife to bring clean clothes this date and therefore declined UB/LB bathing. Pt performed grooming at sink in seated position in w/c with supervision. Pt performed grooming at sink with setup and encouragement. OTR provided education with hip precautions and TTWB restrictions to RLE per MD order as well as fall prevention for sit to stand for self cares. ADL Intervention:  Upper Body Dressing Assistance  Pullover Shirt: Minimum assistance     Grooming  Shaving: Supervision    Therapeutic Activities:  Pt engaged in sit to stand for self cares with Silas. Pt performed functional mobility in w/c in pt room with Silas to maneuver corners. Therapeutic Exercises:  Pt completed 10 minutes on restorator to increase overall strength for self cares with 3-4 short pauses due to inattention. Pt required verbal and visual cues for redirection to tasks.    Patient/Caregiver Education:    Pt. Leslie Waddell Education on weight bearing restrictions and fall prevention as well as safety techniques for self cares was provided to  pt. ASSESSMENT:  Patient continues to demonstrate the need for skilled Occupational Therapy services to improve pt performance and independence with toileting, dressing, bathing and community reintegration and functional mobility tasks in order to D/C home safely. Progression toward goals:  ?      Improving appropriately and progressing toward goals  ? Improving slowly and progressing toward goals  ?       Not making progress toward goals and plan of care will be adjusted     Treatment session:   53 minutes    Therapist:    Tenisha Sanchez OT,  5/17/2019

## 2019-05-17 NOTE — PROGRESS NOTES
[address] [facility]   PHYSICAL THERAPY DAILY TREATMENT NOTE      Patient: Catracho Robb (4 y.o. male)               Date: 5/17/2019    Physician: Hammad Escalona MD  Primary Diagnosis: rt hip fracture          Treatment Diagnosis  Treatment Diagnosis: need for assist with self care  Treatment Diagnosis 2: muscle weakness  Precautions: Fall, TTWB(TTWB RLE)  Vital Signs  Vital Signs  Pulse (Heart Rate): 61  O2 Sat (%): 96 %  BP: 105/66  MAP (Calculated): 79  Cognitive Status:  Mental Status  Neurologic State: Alert;Confused  Orientation Level: Oriented to person  Cognition: Impulsive;Poor safety awareness  Pain  Pain Screen  Pain Scale 1: Numeric (0 - 10)  Pain Intensity 1: 8  Pain Location 1: Flank  Pain Orientation 1: Left  Patient Stated Pain Goal: 0  Pain Reassessment 1: Patient sleeping  Bed Mobility Training  Balance  Sitting: With support  Sitting - Static: Fair (occasional)  Sitting - Dynamic: Fair (occasional)  Standing: With support  Standing - Static: Fair  Standing - Dynamic : Fair  Transfer Training  Transfer Training  Sit to Stand: Minimum assistance; Additional time  Stand to Sit: Contact guard assistance  Sit to Stand: Minimum assistance; Additional time  Gait Training  Gait  Base of Support: Center of gravity altered;Narrowed  Speed/Tamanna: Slow  Step Length: Right shortened;Left shortened  Gait Abnormalities: Decreased step clearance; Antalgic  Ambulation - Level of Assistance: Contact guard assistance  Distance (ft): 82 Feet (ft)  Assistive Device: Orthotic device; Walker, rolling  Interventions: Safety awareness training; Tactile cues; Verbal cues  Gait Abnormalities: Decreased step clearance; Antalgic  Right Side Weight Bearing: Toe touch    With 2 turns. Treatment: Sit<>stand from w/c x3 with initial cues to push-up and to maintain TTWB. Pt then with good carryover for hand placement with continued cues for TTWB on R LE.  Static standing with scale placed under R foot for feedback and to ensure TTWB compliance. Pt stood 1x1'45\" with B UE support and good TTWB compliance. Second trail with 1 UE support and SBA x2'22\" with intermittent cues for TTWB compliance. Gait training rendered with abovementioned details and w/c follow for safety. Pt with verbal and intermittent visual cues for TTWB. Seated B LE TE's rendered to build strength for ease of transfers and ambulation: HR/TR, LAQ, hip flexion, ball squeezes, resisted hip abd (orange band) 2x15. Visual cues with occasional verbal cues for proper technique with TE's. Patient/Caregiver Education:   Pt /Caregiver Education on safety and fall prevention. Pt educated on TTWB (scale used for feedback), see note above. Pt required reminders throughout session due to confusion. ASSESSMENT:  Patient continues to benefit from Skilled PT services to improve bed mobility, sit<>stand, transfers, strength, balance, and gait. Progression toward goals:  ?      Improving appropriately and progressing toward goals  ? Improving slowly and progressing toward goals  ? Not making progress toward goals and plan of care will be adjusted     Treatment session: 47 minutes.   Therapist:   Chalo Davidson PTA,          5/17/2019

## 2019-05-17 NOTE — PROGRESS NOTES
Community Care Team Documentation for Patient in [facility]     Patient discharged from [facility] 4/26/2019 - 4/29/2019 to [facility], [address], on 4/29/2019. Hospital Discharge diagnosis:  Hip fracture. SNF Attending Provider:     Anticipated discharge date from SNF:       PCP : Ulises Ribeiro MD    Nurse Navigator:     [facility] [place] [place] rounds completed, updates provided by facility. Low Risk            5       Total Score        3 Has Seen PCP in Last 6 Months (Yes=3, No=0)    2 . Living with Significant Other. Assisted Living. LTAC. SNF. or   Rehab        Criteria that do not apply:    Patient Length of Stay (>5 days = 3)    IP Visits Last 12 Months (1-3=4, 4=9, >4=11)    Pt.  Coverage (Medicare=5 , Medicaid, or Self-Pay=4)    Charlson Comorbidity Score (Age + Comorbid Conditions)      Active Ambulatory Problems     Diagnosis Date Noted    Hip fracture ([address] 04/26/2019    Closed right hip fracture, initial encounter ([address] 04/26/2019    Atrial fibrillation ([address] 04/26/2019    HTN (hypertension) 04/26/2019     Resolved Ambulatory Problems     Diagnosis Date Noted    No Resolved Ambulatory Problems     Past Medical History:   Diagnosis Date    Alzheimer's disease     Atrial fibrillation ([address]     Diabetes ([address]     Hypertension

## 2019-05-17 NOTE — ROUTINE PROCESS
Bedside and Verbal shift change report given to adeel rn (oncoming nurse) by Migue Ford (offgoing nurse). Report included the following information SBAR, Intake/Output and MAR.

## 2019-05-17 NOTE — PROGRESS NOTES
Bedside shift change report given to Dania Cavanaugh LPN (oncoming nurse) by Georgina Thompson LPN (offgoing nurse). Report included the following information SBAR, Kardex, Procedure Summary, Intake/Output, MAR, Alarm Parameters  and Quality Measures.

## 2019-05-18 LAB
GLUCOSE BLD STRIP.AUTO-MCNC: 125 MG/DL (ref 70–110)
GLUCOSE BLD STRIP.AUTO-MCNC: 136 MG/DL (ref 70–110)
GLUCOSE BLD STRIP.AUTO-MCNC: 187 MG/DL (ref 70–110)
GLUCOSE BLD STRIP.AUTO-MCNC: 216 MG/DL (ref 70–110)

## 2019-05-18 PROCEDURE — 74011636637 HC RX REV CODE- 636/637: Performed by: INTERNAL MEDICINE

## 2019-05-18 PROCEDURE — 74011250637 HC RX REV CODE- 250/637: Performed by: NURSE PRACTITIONER

## 2019-05-18 PROCEDURE — 82962 GLUCOSE BLOOD TEST: CPT

## 2019-05-18 PROCEDURE — 74011250637 HC RX REV CODE- 250/637: Performed by: INTERNAL MEDICINE

## 2019-05-18 RX ADMIN — MAGNESIUM HYDROXIDE 30 ML: 400 SUSPENSION ORAL at 10:56

## 2019-05-18 RX ADMIN — LISINOPRIL 10 MG: 10 TABLET ORAL at 10:56

## 2019-05-18 RX ADMIN — INSULIN LISPRO 2 UNITS: 100 INJECTION, SOLUTION INTRAVENOUS; SUBCUTANEOUS at 21:56

## 2019-05-18 RX ADMIN — WARFARIN SODIUM 2 MG: 2 TABLET ORAL at 18:09

## 2019-05-18 RX ADMIN — PANTOPRAZOLE SODIUM 40 MG: 40 TABLET, DELAYED RELEASE ORAL at 10:56

## 2019-05-18 RX ADMIN — SIMVASTATIN 20 MG: 20 TABLET, FILM COATED ORAL at 21:17

## 2019-05-18 RX ADMIN — METFORMIN HYDROCHLORIDE 1000 MG: 500 TABLET ORAL at 11:08

## 2019-05-18 RX ADMIN — CHOLECALCIFEROL (VITAMIN D3) 25 MCG (1,000 UNIT) TABLET 1000 UNITS: at 10:56

## 2019-05-18 RX ADMIN — METFORMIN HYDROCHLORIDE 1000 MG: 500 TABLET ORAL at 18:08

## 2019-05-18 RX ADMIN — DOCUSATE SODIUM 100 MG: 100 CAPSULE, LIQUID FILLED ORAL at 10:56

## 2019-05-18 RX ADMIN — Medication 1000 MG: at 09:00

## 2019-05-18 RX ADMIN — OXYCODONE HYDROCHLORIDE AND ACETAMINOPHEN 2 TABLET: 10; 325 TABLET ORAL at 01:32

## 2019-05-18 RX ADMIN — OXYCODONE HYDROCHLORIDE AND ACETAMINOPHEN 1 TABLET: 5; 325 TABLET ORAL at 10:56

## 2019-05-18 RX ADMIN — POLYETHYLENE GLYCOL 3350 17 G: 17 POWDER, FOR SOLUTION ORAL at 10:55

## 2019-05-18 NOTE — PROGRESS NOTES
Bedside shift change report given to Melonie Conde LPN (oncoming nurse) by Monique Bhakta LPN (offgoing nurse). Report included the following information SBAR, Kardex, Procedure Summary, Intake/Output, MAR and Quality Measures.

## 2019-05-18 NOTE — ROUTINE PROCESS
Bedside and Verbal shift change report given to Walter Benton LPN (oncoming nurse) by Neelima Anne RN (offgoing nurse). Report included the following information SBAR, Kardex and MAR.

## 2019-05-19 LAB
GLUCOSE BLD STRIP.AUTO-MCNC: 105 MG/DL (ref 70–110)
GLUCOSE BLD STRIP.AUTO-MCNC: 131 MG/DL (ref 70–110)
GLUCOSE BLD STRIP.AUTO-MCNC: 211 MG/DL (ref 70–110)
GLUCOSE BLD STRIP.AUTO-MCNC: 256 MG/DL (ref 70–110)
GLUCOSE BLD STRIP.AUTO-MCNC: 65 MG/DL (ref 70–110)
GLUCOSE BLD STRIP.AUTO-MCNC: 67 MG/DL (ref 70–110)

## 2019-05-19 PROCEDURE — 82962 GLUCOSE BLOOD TEST: CPT

## 2019-05-19 PROCEDURE — 74011250637 HC RX REV CODE- 250/637: Performed by: INTERNAL MEDICINE

## 2019-05-19 PROCEDURE — 74011250637 HC RX REV CODE- 250/637: Performed by: NURSE PRACTITIONER

## 2019-05-19 PROCEDURE — 74011636637 HC RX REV CODE- 636/637: Performed by: INTERNAL MEDICINE

## 2019-05-19 RX ADMIN — POLYETHYLENE GLYCOL 3350 17 G: 17 POWDER, FOR SOLUTION ORAL at 10:24

## 2019-05-19 RX ADMIN — WARFARIN SODIUM 2 MG: 2 TABLET ORAL at 17:56

## 2019-05-19 RX ADMIN — OXYCODONE HYDROCHLORIDE AND ACETAMINOPHEN 1 TABLET: 5; 325 TABLET ORAL at 04:49

## 2019-05-19 RX ADMIN — METFORMIN HYDROCHLORIDE 1000 MG: 500 TABLET ORAL at 10:23

## 2019-05-19 RX ADMIN — LISINOPRIL 10 MG: 10 TABLET ORAL at 10:24

## 2019-05-19 RX ADMIN — CHOLECALCIFEROL (VITAMIN D3) 25 MCG (1,000 UNIT) TABLET 1000 UNITS: at 10:23

## 2019-05-19 RX ADMIN — OXYCODONE HYDROCHLORIDE AND ACETAMINOPHEN 2 TABLET: 10; 325 TABLET ORAL at 10:22

## 2019-05-19 RX ADMIN — DOCUSATE SODIUM 100 MG: 100 CAPSULE, LIQUID FILLED ORAL at 10:23

## 2019-05-19 RX ADMIN — PANTOPRAZOLE SODIUM 40 MG: 40 TABLET, DELAYED RELEASE ORAL at 10:23

## 2019-05-19 RX ADMIN — INSULIN LISPRO 4 UNITS: 100 INJECTION, SOLUTION INTRAVENOUS; SUBCUTANEOUS at 22:03

## 2019-05-19 RX ADMIN — Medication 1000 MG: at 09:00

## 2019-05-19 RX ADMIN — SIMVASTATIN 20 MG: 20 TABLET, FILM COATED ORAL at 21:12

## 2019-05-19 RX ADMIN — METFORMIN HYDROCHLORIDE 1000 MG: 500 TABLET ORAL at 16:56

## 2019-05-19 RX ADMIN — INSULIN LISPRO 6 UNITS: 100 INJECTION, SOLUTION INTRAVENOUS; SUBCUTANEOUS at 12:56

## 2019-05-19 NOTE — PROGRESS NOTES
patient's bed alarm went off, saw patient going inside his bathroom with walker, reminded patient to used the call bell whenever he needs assistance to the bathroom, offered to use urinal in case he needs to use the bathroom immediately. Bed alarm on, 2 side rails up, call bell at bedside.

## 2019-05-19 NOTE — PROGRESS NOTES
[address] [facility] [address]   PHYSICAL THERAPY DAILY TREATMENT NOTE      Patient: Ramona Robledo (13 y.o. male)               Date: 5/19/2019    Physician: Gonzalez Ayala MD  Primary Diagnosis: rt hip fracture          Treatment Diagnosis  Treatment Diagnosis: need for assist with self care  Treatment Diagnosis 2: muscle weakness  Precautions: Fall, TTWB(TTWB RLE)  Vital Signs  Vital Signs  Temp: 97.6 °F (36.4 °C)  Temp Source: Oral  Pulse (Heart Rate): 71  Resp Rate: 18  O2 Sat (%): 96 %  BP: 118/56  MAP (Calculated): 77  Cognitive Status:  Mental Status  Orientation Level: Oriented to person  Cognition: Impulsive  Pain  Bed Mobility Training  Bed Mobility Training  Supine to Sit: Minimum assistance; Additional time;Assist x1  Interventions: Safety awareness training; Tactile cues; Verbal cues  Balance  Sitting: With support  Sitting - Static: Fair (occasional)  Sitting - Dynamic: Fair (occasional)  Standing: With support  Standing - Static: Fair  Standing - Dynamic : Fair  Transfer Training  Transfer Training  Sit to Stand: Minimum assistance; Additional time;Assist x1  Stand to Sit: Contact guard assistance;Minimum assistance; Additional time;Assist x1  Sit to Stand: Minimum assistance; Additional time;Assist x1  Gait Training  Gait  Base of Support: Center of gravity altered;Narrowed  Speed/Tamanna: Slow  Step Length: Left shortened;Right shortened  Gait Abnormalities: Antalgic;Decreased step clearance  Ambulation - Level of Assistance: Contact guard assistance  Distance (ft): 106 Feet (ft)  Assistive Device: Gait belt;Walker, rolling  Interventions: Safety awareness training;Verbal cues  Gait Abnormalities: Antalgic;Decreased step clearance  Right Side Weight Bearing:  Toe touch  Treatment:   Pt is progressing well towards reaching goals and demonstrated improvement in overall gait distance this am. Pt amb 106ft CGA to close supervision, TTWB R LE using FWW+w/c follow and VC's/demonstration cues for maintaining WB precaution during mobility with pt demonstrating good carry over after receiving cues. Pt completed seated TE AROM B LE's 2.5#'s L LE, 0# R LE: ankle pumps, LAQ's, hip marches, hip add with ball, and red TB (hip abd, hamstring curls) 2x15 reps to improve strength/endurance. Patient/Caregiver Education:   Pt /Caregiver Education on importance of maintaining TTWB precaution on R LE during transfers/gait activities to improve functional mobility with maximize safety and to promote healing. Pt needs minimal reinforcement for compliance. ASSESSMENT:  Patient continues to benefit from Skilled PT services to improve ROM, strength, endurance, balance, and functional mobility while maintaining WB precuations. Progression toward goals:  ?      Improving appropriately and progressing toward goals  ? Improving slowly and progressing toward goals  ? Not making progress toward goals and plan of care will be adjusted     Treatment session: 46 minutes.   Therapist:   Donya Lozano PTA,          5/19/2019

## 2019-05-19 NOTE — ROUTINE PROCESS
Bedside, Verbal and Written shift change report given to shaylee damon (oncoming nurse) by Anitha Purvis (offgoing nurse). Report included the following information SBAR, Procedure Summary and MAR.

## 2019-05-20 LAB
ANION GAP SERPL CALC-SCNC: 8 MMOL/L (ref 3–18)
BASOPHILS # BLD: 0 K/UL (ref 0–0.1)
BASOPHILS NFR BLD: 1 % (ref 0–2)
BUN SERPL-MCNC: 22 MG/DL (ref 7–18)
BUN/CREAT SERPL: 26 (ref 12–20)
CALCIUM SERPL-MCNC: 8.5 MG/DL (ref 8.5–10.1)
CHLORIDE SERPL-SCNC: 104 MMOL/L (ref 100–108)
CO2 SERPL-SCNC: 25 MMOL/L (ref 21–32)
CREAT SERPL-MCNC: 0.86 MG/DL (ref 0.6–1.3)
DIFFERENTIAL METHOD BLD: ABNORMAL
EOSINOPHIL # BLD: 0.2 K/UL (ref 0–0.4)
EOSINOPHIL NFR BLD: 3 % (ref 0–5)
ERYTHROCYTE [DISTWIDTH] IN BLOOD BY AUTOMATED COUNT: 14.7 % (ref 11.6–14.5)
GLUCOSE BLD STRIP.AUTO-MCNC: 140 MG/DL (ref 70–110)
GLUCOSE BLD STRIP.AUTO-MCNC: 152 MG/DL (ref 70–110)
GLUCOSE BLD STRIP.AUTO-MCNC: 161 MG/DL (ref 70–110)
GLUCOSE BLD STRIP.AUTO-MCNC: 172 MG/DL (ref 70–110)
GLUCOSE SERPL-MCNC: 138 MG/DL (ref 74–99)
HCT VFR BLD AUTO: 34.4 % (ref 36–48)
HGB BLD-MCNC: 10.7 G/DL (ref 13–16)
INR PPP: 2.4 (ref 0.8–1.2)
LYMPHOCYTES # BLD: 1.4 K/UL (ref 0.9–3.6)
LYMPHOCYTES NFR BLD: 23 % (ref 21–52)
MCH RBC QN AUTO: 27.1 PG (ref 24–34)
MCHC RBC AUTO-ENTMCNC: 31.1 G/DL (ref 31–37)
MCV RBC AUTO: 87.1 FL (ref 74–97)
MONOCYTES # BLD: 0.7 K/UL (ref 0.05–1.2)
MONOCYTES NFR BLD: 11 % (ref 3–10)
NEUTS SEG # BLD: 3.8 K/UL (ref 1.8–8)
NEUTS SEG NFR BLD: 62 % (ref 40–73)
PLATELET # BLD AUTO: 324 K/UL (ref 135–420)
PMV BLD AUTO: 10.1 FL (ref 9.2–11.8)
POTASSIUM SERPL-SCNC: 4.8 MMOL/L (ref 3.5–5.5)
PROTHROMBIN TIME: 26.4 SEC (ref 11.5–15.2)
RBC # BLD AUTO: 3.95 M/UL (ref 4.7–5.5)
SODIUM SERPL-SCNC: 137 MMOL/L (ref 136–145)
WBC # BLD AUTO: 6 K/UL (ref 4.6–13.2)

## 2019-05-20 PROCEDURE — 82962 GLUCOSE BLOOD TEST: CPT

## 2019-05-20 PROCEDURE — 74011250637 HC RX REV CODE- 250/637: Performed by: NURSE PRACTITIONER

## 2019-05-20 PROCEDURE — 85025 COMPLETE CBC W/AUTO DIFF WBC: CPT

## 2019-05-20 PROCEDURE — 85610 PROTHROMBIN TIME: CPT

## 2019-05-20 PROCEDURE — 80048 BASIC METABOLIC PNL TOTAL CA: CPT

## 2019-05-20 PROCEDURE — 74011636637 HC RX REV CODE- 636/637: Performed by: INTERNAL MEDICINE

## 2019-05-20 PROCEDURE — 36415 COLL VENOUS BLD VENIPUNCTURE: CPT

## 2019-05-20 PROCEDURE — 74011250637 HC RX REV CODE- 250/637: Performed by: INTERNAL MEDICINE

## 2019-05-20 RX ADMIN — INSULIN LISPRO 2 UNITS: 100 INJECTION, SOLUTION INTRAVENOUS; SUBCUTANEOUS at 12:26

## 2019-05-20 RX ADMIN — POLYETHYLENE GLYCOL 3350 17 G: 17 POWDER, FOR SOLUTION ORAL at 08:36

## 2019-05-20 RX ADMIN — INSULIN LISPRO 2 UNITS: 100 INJECTION, SOLUTION INTRAVENOUS; SUBCUTANEOUS at 08:34

## 2019-05-20 RX ADMIN — OXYCODONE HYDROCHLORIDE AND ACETAMINOPHEN 2 TABLET: 10; 325 TABLET ORAL at 04:05

## 2019-05-20 RX ADMIN — CHOLECALCIFEROL (VITAMIN D3) 25 MCG (1,000 UNIT) TABLET 1000 UNITS: at 08:35

## 2019-05-20 RX ADMIN — OXYCODONE HYDROCHLORIDE AND ACETAMINOPHEN 2 TABLET: 10; 325 TABLET ORAL at 08:38

## 2019-05-20 RX ADMIN — PANTOPRAZOLE SODIUM 40 MG: 40 TABLET, DELAYED RELEASE ORAL at 07:30

## 2019-05-20 RX ADMIN — Medication 1000 MG: at 09:00

## 2019-05-20 RX ADMIN — LISINOPRIL 10 MG: 10 TABLET ORAL at 08:35

## 2019-05-20 RX ADMIN — WARFARIN SODIUM 2 MG: 2 TABLET ORAL at 18:01

## 2019-05-20 RX ADMIN — METFORMIN HYDROCHLORIDE 1000 MG: 500 TABLET ORAL at 18:01

## 2019-05-20 RX ADMIN — METFORMIN HYDROCHLORIDE 1000 MG: 500 TABLET ORAL at 08:35

## 2019-05-20 RX ADMIN — INSULIN LISPRO 2 UNITS: 100 INJECTION, SOLUTION INTRAVENOUS; SUBCUTANEOUS at 17:00

## 2019-05-20 RX ADMIN — DOCUSATE SODIUM 100 MG: 100 CAPSULE, LIQUID FILLED ORAL at 08:35

## 2019-05-20 RX ADMIN — SIMVASTATIN 20 MG: 20 TABLET, FILM COATED ORAL at 21:50

## 2019-05-20 NOTE — PROGRESS NOTES
[facility]    Daily progress Note    Patient: Janes Huynh MRN: [id number]  CSN: [id number]    YOB: 1931  Age: 80 y.o. Sex: male    DOA: 4/29/2019 LOS:  LOS: 21 days                    Subjective:     CC: follow hypoglycemic evident    Ms. Tanja Mccabe is a 80 yr old female who was recently hospitalized 4/26-4/29. Patient came in ER post mechanical fall at home. XRAY showed  Impacted subcapital right femoral neck fracture with moderately severe right hip joint osteoarthritis. Ortho saw patient and had right hip percutaneous pinning on 4/26 and tolerated. Since admitted to [facility], patient had 2 falls unwitnessed, no injury. Patient had issues with constipation, which is resolved with bowel regiment. Noted patient had episode of hypoglycemic episode. Patient BS reviewed, noted his BS fluctuates, spikes in 200s few times. VSS reviewed, stable on current meds. Labs reviewed, unremarkable.        PAST MEDICAL HX: Alzheimer's Disease, HTN AFIB, DM type 2      PAST SURGICAL Hx: hernia repair     SOCIAL Hx:      ALLERGIES: NKDA     ROSCONST: Fever, weight loss, fatigue or chills  HEENT: Recent changes in vision, vertigo  CV: Chest pain, palpitations, edema and varicosities  RESP: Cough, shortness of breath, wheezing  GI: Nausea, vomiting, abdominal pain, change in bowel habits,  : Hematuria, dysuria, frequency  MS: Weakness, right hip sorness  LYMPH/HEME: Anemia, bruising and history of blood transfusions  INTEG: Dermatitis, abnormal moles  NEURO: Dizziness, headache, fainting, seizures and stroke  PSYCH: Anxiety and depression                 Objective:      Visit Vitals  /88   Pulse 80   Temp 98.3 °F (36.8 °C)   Resp 18   Ht 5' 9\" (1.753 m)   Wt 61.6 kg (135 lb 12.8 oz)   SpO2 100%   BMI 20.05 kg/m²       Physical Exam:  General appearance: alert, cooperative, no distress, appears stated age, appears older than stated age  [de-identified]: negative  Neck: supple, symmetrical, trachea midline, no adenopathy, thyroid: not enlarged, symmetric, no tenderness/mass/nodules, no carotid bruit and no JVD  Lungs: clear to auscultation bilaterally  Heart: regular rate and rhythm, S1, S2 normal, no murmur, click, rub or gallop  Abdomen: soft, non-tender. Bowel sounds normal. No masses,  no organomegaly  Pulses: 2+ and symmetric  Skin: Skin color, texture, turgor normal. No rashes or lesions      Intake and Output:  Current Shift:  No intake/output data recorded. Last three shifts:  No intake/output data recorded. Recent Results (from the past 24 hour(s))   GLUCOSE, POC    Collection Time: 05/19/19 12:07 PM   Result Value Ref Range    Glucose (POC) 256 (H) 70 - 110 mg/dL   GLUCOSE, POC    Collection Time: 05/19/19  4:21 PM   Result Value Ref Range    Glucose (POC) 65 (L) 70 - 110 mg/dL   GLUCOSE, POC    Collection Time: 05/19/19  4:22 PM   Result Value Ref Range    Glucose (POC) 67 (L) 70 - 110 mg/dL   GLUCOSE, POC    Collection Time: 05/19/19  5:17 PM   Result Value Ref Range    Glucose (POC) 105 70 - 110 mg/dL   GLUCOSE, POC    Collection Time: 05/19/19 10:02 PM   Result Value Ref Range    Glucose (POC) 211 (H) 70 - 110 mg/dL   GLUCOSE, POC    Collection Time: 05/20/19  4:09 AM   Result Value Ref Range    Glucose (POC) 152 (H) 70 - 110 mg/dL   CBC WITH AUTOMATED DIFF    Collection Time: 05/20/19  4:10 AM   Result Value Ref Range    WBC 6.0 4.6 - 13.2 K/uL    RBC 3.95 (L) 4.70 - 5.50 M/uL    HGB 10.7 (L) 13.0 - 16.0 g/dL    HCT 34.4 (L) 36.0 - 48.0 %    MCV 87.1 74.0 - 97.0 FL    MCH 27.1 24.0 - 34.0 PG    MCHC 31.1 31.0 - 37.0 g/dL    RDW 14.7 (H) 11.6 - 14.5 %    PLATELET 677 935 - 904 K/uL    MPV 10.1 9.2 - 11.8 FL    NEUTROPHILS 62 40 - 73 %    LYMPHOCYTES 23 21 - 52 %    MONOCYTES 11 (H) 3 - 10 %    EOSINOPHILS 3 0 - 5 %    BASOPHILS 1 0 - 2 %    ABS. NEUTROPHILS 3.8 1.8 - 8.0 K/UL    ABS. LYMPHOCYTES 1.4 0.9 - 3.6 K/UL    ABS. MONOCYTES 0.7 0.05 - 1.2 K/UL    ABS. EOSINOPHILS 0.2 0.0 - 0.4 K/UL    ABS. BASOPHILS 0.0 0.0 - 0.1 K/UL    DF AUTOMATED     PROTHROMBIN TIME + INR    Collection Time: 05/20/19  4:10 AM   Result Value Ref Range    Prothrombin time 26.4 (H) 11.5 - 15.2 sec    INR 2.4 (H) 0.8 - 1.2     METABOLIC PANEL, BASIC    Collection Time: 05/20/19  4:10 AM   Result Value Ref Range    Sodium 137 136 - 145 mmol/L    Potassium 4.8 3.5 - 5.5 mmol/L    Chloride 104 100 - 108 mmol/L    CO2 25 21 - 32 mmol/L    Anion gap 8 3.0 - 18 mmol/L    Glucose 138 (H) 74 - 99 mg/dL    BUN 22 (H) 7.0 - 18 MG/DL    Creatinine 0.86 0.6 - 1.3 MG/DL    BUN/Creatinine ratio 26 (H) 12 - 20      GFR est AA >60 >60 ml/min/1.73m2    GFR est non-AA >60 >60 ml/min/1.73m2    Calcium 8.5 8.5 - 10.1 MG/DL         Current Facility-Administered Medications:     docusate sodium (COLACE) capsule 100 mg, 100 mg, Oral, DAILY, Nelly Hansen NP, 100 mg at 05/20/19 0835    polyethylene glycol (MIRALAX) packet 17 g, 17 g, Oral, DAILY, Nam BOLDEN NP, 17 g at 05/20/19 0836    [facility] ANESTHESIA, , Other, PRN, Violetta Singh MD    [facility] EMERGENCY/STAT BOX, , Other, PRN, Violetta Singh MD    oxyCODONE-acetaminophen (PERCOCET) 5-325 mg per tablet 1 Tab, 1 Tab, Oral, Q4H PRN, Violetta Singh MD, 1 Tab at 05/19/19 2132    oxyCODONE-acetaminophen (PERCOCET 10)  mg per tablet 2 Tab, 2 Tab, Oral, Q4H PRN, Violetta Singh MD, 2 Tab at 05/20/19 6872    warfarin (COUMADIN) tablet 2 mg, 2 mg, Oral, QPM, Violetta Singh MD, 2 mg at 05/19/19 5652    cholecalciferol (VITAMIN D3) tablet 1,000 Units, 1,000 Units, Oral, DAILY, Latosha Xiao MD, 1,000 Units at 05/20/19 0835    lisinopril (PRINIVIL, ZESTRIL) tablet 10 mg, 10 mg, Oral, DAILY, Latosha Xiao MD, 10 mg at 05/20/19 0835    metFORMIN (GLUCOPHAGE) tablet 1,000 mg, 1,000 mg, Oral, BID WITH MEALS, Latosha Xiao MD, 1,000 mg at 05/20/19 0835    pantoprazole (PROTONIX) tablet 40 mg, 40 mg, Oral, ACB, Latosha Xiao MD, 40 mg at 05/20/19 0730   insulin lispro (HUMALOG) injection, , SubCUTAneous, AC&HS, Krupa Gomez MD, 2 Units at 05/20/19 0834    flaxseed oil cap 1,000 mg (Patient Supplied), 1,000 mg, Oral, DAILY, Krupa Gomez MD, 1,000 mg at 05/20/19 0900    WARFARIN INFORMATION NOTE (COUMADIN), , Other, PRN, Krupa Gomez MD    simvastatin (ZOCOR) tablet 20 mg, 20 mg, Oral, QHS, Krupa Gomez MD, 20 mg at 05/19/19 2112    magnesium hydroxide (MILK OF MAGNESIA) 400 mg/5 mL oral suspension 30 mL, 30 mL, Oral, DAILY PRN, Krupa Gomez MD, 30 mL at 05/18/19 1056    bisacodyl (DULCOLAX) suppository 10 mg, 10 mg, Rectal, DAILY PRN, Krupa Gomez MD, 10 mg at 05/14/19 0240    Lab Results   Component Value Date/Time    Glucose 138 (H) 05/20/2019 04:10 AM    Glucose 131 (H) 05/13/2019 04:20 AM    Glucose 116 (H) 05/06/2019 05:05 AM    Glucose 212 (H) 04/29/2019 06:55 PM    Glucose 181 (H) 04/27/2019 04:10 AM        Assessment/Plan   Closed right hip fracture: s/p right hip percutaneous pinning on 4/26. Orthopedic surgery in 1 month     Constipated:  Continue colace + Miralax daily, and prn dulcolax suppository      AFIB: continue Coumadin with PT/INR monitoring  Monday and Thursday. INR 2.4, continue current dose of coumadin     DM type 2 , hgab1c 6.8 on 4/26, will continue metformin 1000 mg bid + SSI. Patient had 1 episode of hypoglycemia. On 5/19. Other wise his BS have been fluctuating. Will closely monitor BS, may adjust his SSI if recurrent episode.      HTN; BP stable on Lisinopril      Hyperlipidemia: continue Zocor      Continue PT/OT     FLU: he report that he received FLU shot this year, not sure about month     Ritu Valdes, SARAH  5/20/2019, 8:50 AM

## 2019-05-20 NOTE — PROGRESS NOTES
Met with Mr. Ashford and discussed guitarist coming today.  He was doing a puzzle and looking forward to guitarist.  Signed By: Gali Garcia     May 20, 2019

## 2019-05-20 NOTE — PROGRESS NOTES
[address] [facility]   PHYSICAL THERAPY DAILY TREATMENT NOTE      Patient: Latisha Foster (55 y.o. male)               Date: 5/20/2019    Physician: Desmond Underwood MD  Primary Diagnosis: rt hip fracture          Treatment Diagnosis  Treatment Diagnosis: need for assist with self care  Treatment Diagnosis 2: muscle weakness  Precautions: Fall, TTWB(TTWB RLE)  Vital Signs  Vital Signs  Temp: 98.3 °F (36.8 °C)  Temp Source: Oral  Pulse (Heart Rate): 80  Heart Rate Source: Monitor  O2 Sat (%): 100 %  Level of Consciousness: Alert  BP: 132/88  MAP (Calculated): 74  Cognitive Status:  Mental Status  Neurologic State: Alert  Orientation Level: Oriented to person  Cognition: Impulsive; Impaired decision making;Decreased attention/concentration  Pain  Bed Mobility Training  Balance  Sitting: With support  Sitting - Static: Fair (occasional)  Sitting - Dynamic: Fair (occasional)  Standing: With support  Standing - Static: Fair  Standing - Dynamic : Fair  Transfer Training  Transfer Training  Sit to Stand: Minimum assistance; Additional time;Assist x1  Stand to Sit: Contact guard assistance; Additional time  Sit to Stand: Minimum assistance; Additional time;Assist x1  Gait Training  Gait  Base of Support: Center of gravity altered;Narrowed; Shift to left  Step Length: Left shortened;Right shortened  Gait Abnormalities: Decreased step clearance  Ambulation - Level of Assistance: Contact guard assistance  Distance (ft): 104 Feet (ft)  Assistive Device: Gait belt;Walker, rolling  Rail Use: Both  Stairs - Level of Assistance: Minimum assistance  Number of Stairs Trained: 3(4 inch)  Interventions: Safety awareness training; Tactile cues; Verbal cues; Visual/Demos  Gait Abnormalities: Decreased step clearance  Right Side Weight Bearing:  Toe touch  Treatment:   Pt tolerated treatment session well and initiated stair training x3 steps (4 inch) min A using B HR, requiring VC's/demonstration cues for proper LE stepping sequencing technique with focus on maintaining TTWB on R LE throughout activity. Pt was compliant with maintaining WB precautions after receiving cues. Pt completed standing TE R LE only: hip flexion marches and hip abd 2x15 reps, seated TE 2.5# L, 1.5# R: LAQ's, red TB (hip abd), and ankle pumps 2x20 reps to improve LE strength/endurance. Patient/Caregiver Education:   Pt /Caregiver Education on benefits of exercise and importance of maintaining TTWB restriction at all times to maximize overall safety and promote healing. Pt demonstrated understanding after receiving cues for proper technique. ASSESSMENT:  Patient continues to benefit from Skilled PT services to improve strength, endurance, balance, and functional mobility with upright tasks while maintaining WB precautions. Progression toward goals:  ?      Improving appropriately and progressing toward goals  ? Improving slowly and progressing toward goals  ? Not making progress toward goals and plan of care will be adjusted     Treatment session: 47 minutes.   Therapist:   Finley Hammans, PTA,          5/20/2019

## 2019-05-20 NOTE — PROGRESS NOTES
Problem: Self Care Deficits Care Plan (Adult)  Goal: *Acute Goals and Plan of Care (Insert Text)  Description  -CLOF: OCCUPATIONAL THERAPY SHORT TERM GOALS        Updated 5/13/19    1. Patient will perform Upper body ADL's without adaptive equipment with modified independence. 2.  Patient will perform Lower body ADL's with adaptive equipment with moderate assistance. 3.  Patient will perform toileting task with moderate assistance  with Good safety to reduce falls risk. 4.  Patient will perform bedside commode transfers with Summer Florina and contact guard assistance while adhering to RLE TTWB. 5.  Patient will perform standing static/dynamic balance activities for improved ADL function with minimal assistance and Fair+ balance and safety awareness while adhering to RLE TTWB. 6.  Patient will improve Barthel index scores to atleast 24521 to improve functional mobility. 7.  Patient will utilize energy conservation techniques during functional activities with minimal verbal cues. Updated 5/6/19    1. Patient will perform Upper body ADL's without adaptive equipment with modified independence. -CLOF: bathing w/ SBA, dressing w/ Min A  2. Patient will perform Lower body ADL's with adaptive equipment with moderate assistance. -CLOF: bathing w/ Mod A , dressing w/ Max A using AE: reacher  3. Patient will perform toileting task with moderate assistance  with Good safety to reduce falls risk. -CLOF: Mod I bladder mgmt using urinal, Dep clothing mgmt and bowel mgmt  4. Patient will perform bedside commode transfers with Rolling Walker and minimal assistance while adhering to RLE TTWB. - CLOF: Min A w/ RW adhering to R LE TTWB, upgrade  5. Patient will perform standing static/dynamic balance activities for improved ADL function with minimal assistance and Fair+ balance and safety awareness while adhering to RLE TTWB.-CLOF: Fair- static, Fair- dynamic w/ RW and CGA - Min A  6.   Patient will improve Barthel index scores to atleast [id number] to improve functional mobility. - CLOF: /100  7. Patient will utilize energy conservation techniques during functional activities with minimal verbal cues. - CLOF: Max verbal cues     Initiated 4/30/2019 and to be accomplished within 2 Week(s)    1. Patient will perform Upper body ADL's without adaptive equipment with modified independence. -CLOF: bathing and dressing w/ set up  2. Patient will perform Lower body ADL's with adaptive equipment with moderate assistance. -CLOF: bathing and dressing w/ Max A  3. Patient will perform toileting task with moderate assistance  with Good safety to reduce falls risk. -CLOF: Mod I w/ bladder mgmt using urinal. Dep bowel mgmt, Max A clothing mgmt  4. Patient will perform bedside commode transfers with Rolling Walker and minimal assistance while adhering to RLE TTWB.-CLOF: CGA w/ RW while adhering to RLE TTWB  5. Patient will perform standing static/dynamic balance activities for improved ADL function with minimal assistance and Fair+ balance and safety awareness while adhering to RLE TTWB.-CLOF: Karmen static and Fair dynamic w/ RW & CGA while adhering to RLE TTWB  6. Patient will improve Barthel index scores to atleast 77134 to improve functional mobility. -CLOF: 55/100  7. Patient will utilize energy conservation techniques during functional activities with minimal verbal cues. -CLOF:  Max verbal cues    OCCUPATIONAL THERAPY LONG TERM GOALS   Initiated 4/30/2019 and to be accomplished within 4 Week(s)    1. Patient will perform ADL's with adaptive equipment as needed with modified independence. 2.  Patient will perform toileting task with modified independence with Good safety to reduce falls risk. 3.  Patient will perform all functional transfers utilizing least restrictive device and durable medical equipment as needed with modified independence and Good balance and safety awareness.   4.  Patient will perform standing static/dynamic activity for improved ADL function with modified independence and Good balance and safety awareness. 5.  Patient will improve Barthel index score to 95/100 to improve independence with mobility. 6. Patient will perform UE HEP with Modified Emery to increase BUE strength for continued participation in ADLs. Therapist: Bernard Fregoso    4/30/2019                Outcome: Progressing Towards Goal   [facility]  OCCUPATIONAL THERAPY WEEKLY SUMMARY   Reporting period:  from 5/13/19 through 5/20/19       Patient: Pascual Single (48 y.o. male)   Date: 5/20/2019    Primary Diagnosis: rt hip fracture       Attending Physician: Harris Bridges MD Treatment Diagnosis  Treatment Diagnosis: need for assist with self care  Treatment Diagnosis 2: muscle weakness  Precautions: Fall, TTWB(TTWB RLE)  Rehab Potential : Good    Skill interventions and education provided with clinical rationale (include individualized treatment techniques and standardized tests):  Skilled Occupational services were provided utilizing ADL retraining, instruction on adaptive equipment training, safety awareness and energy conservation techniques incorporating balance retraining & UE ROM techniques, therapeutic exercise and activities incorporating strengthening in order to improve ADL performance skills and functional mobility. Patient has made fair progress, continue OT skilled services in order for patient to reach maximal functional potential towards goals for safe d/c home.         Using a comparative statement, summarize significant progress toward goals as a result of skilled intervention provided:  Patient has made Fair progress towards their Occupational Therapy goals in the following areas: grooming, upper body dressing, lower body dressing, toileting and toilet transfer using Reacher  Identify remaining functional areas, impairments limiting progress and/or barriers to improvement:  Patient would benefit from continued skilled Occupational Therapy Services to address the following functional deficits in  balance, coordination, safety awareness, strength and functional activity tolerance, which is preventing maximum independence and safety with ADL performance skills and functional mobility. OBJECTIVE DATA SUMMARY:       INITIAL ASSESSMENT WEEKLY PROGRESS   COGNITIVE STATUS: COGNITIVE STATUS:   Neurologic State: Alert  Orientation Level: Oriented X4(forgetful at times)  Cognition: Follows commands  Perception: Appears intact  Perseveration: No perseveration noted  Safety/Judgement: Fall prevention Neurologic State: Alert  Orientation Level: Oriented to person  Cognition: Impulsive, Impaired decision making, Decreased attention/concentration  Perception: Appears intact  Perseveration: No perseveration noted  Safety/Judgement: Fall prevention   PAIN: PAIN:   Pain Scale 1: Numeric (0 - 10)  Pain Intensity 1: 4  Pain Onset 1: acute  Pain Location 1: Hip  Pain Orientation 1: Right  Pain Description 1: Aching  Pain Intervention(s) 1: Medication (see MAR)  Patient Stated Pain Goal: 0  Pain Reassessment 1: Yes     Pain Scale 1: Numeric (0 - 10)  Pain Intensity 1: 0  Pain Onset 1: acute  Pain Location 1: Hip  Pain Orientation 1: Right  Pain Description 1: Aching  Pain Intervention(s) 1: Medication (see MAR), Repositioned, Rest  Patient Stated Pain Goal: 0  Pain Reassessment 1: Yes   BED MOBILITY BED MOBILITY   Rolling: Moderate assistance, Assist x2  Supine to Sit: Moderate assistance(for RLE management)  Scooting: Minimum assistance Rolling:  Moderate assistance, Assist x2  Supine to Sit: Minimum assistance, Additional time, Assist x1  Scooting: Contact guard assistance, Additional time   ADL SELF CARE ADL SELF CARE   Oral Facial Hygiene/Grooming: Setup  Type of Bath: Basin/Soap/Water  Upper Body Dressing: Stand-by assistance(donning pullover shirt)  Lower Body Dressing: Maximum assistance(2/2 R LE pain)  Toileting: Minimum assistance(A with clothing mgmt) Bathing Assistance: Stand-by assistance  Position Performed: (seated on BSC)  Cues: Verbal cues provided, Visual cues provided    Dressing Assistance: Modified independent  Front Opened Shirt: Modified independent(Pt able to zip and button front-open shirt/sweater)    Bathing Assistance: Moderate assistance  Perineal  : Moderate assistance(Set up w/ anterior, dep w/ posterior)  Position Performed: Supine  Cues: Verbal cues provided(to maintain RLE TTWB)  Lower Body : Supervision, Set-up(upper thighs)  Position Performed: Seated edge of bed    Toileting Assistance: Minimum assistance(Fair safety)  Bladder Hygiene: Modified independent(urinal)  Bowel Hygiene: Minimum assistance (dependent)  Clothing Management: Minimum assistance  Adaptive Equipment: Walker(BS)  Comments: Educated pt and caregiver on providing elastic waist pants for greater independence with CM during toileting task. Grooming Assistance: Set-up  Washing Face: Stand-by assistance  Brushing Teeth: Modified Independent, seated w/c level at sink  Shaving: Supervision  Brushing/Combing Hair: Set-up, Modified independent    Dressing Assistance: Moderate assistance  Protective Undergarmet: Moderate Assistance  Pants With Elastic Waist:Moderate Assistance  Pants With Button/Zipper: Moderate assistance  Socks: Moderate assistance  Shoes with Velcro: Moderate assistance  Leg Crossed Method Used: No  Position Performed: Seated in chair, Standing  Cues: Physical assistance, Verbal cues provided(for weight bearing reminders)  Adaptive Equipment Used: Reacher    Feeding Assistance: Modified independent  Container Management: Total assistance (dependent)  Cutting Food: Total assistance (dependent)  Utensil Management: Modified independent  Food to Mouth: Modified independent  Drink to Mouth: Modified independent   TRANSFERS TRANSFERS   Sit to Stand:  Moderate assistance  Stand to Sit: Moderate assistance  Bed to Chair: Minimum assistance  Toilet Transfer : Moderate assistance, Assist x1 Sit to Stand: Minimum assistance, Additional time, Assist x1  Stand to Sit: Contact guard assistance, Additional time  Bed to Chair: Contact guard assistance, Additional time  Toilet Transfer : Contact guard assistance   Toilet Transfer :  Moderate assistance, Assist x1  Cues: Verbal cues provided(to maintain RLE TTWB)  Adaptive Equipment: Cane (comment), Walker (comment) Toilet Transfer : Contact guard assistance  Cues: Verbal cues provided(to maintain RLE TTWB)  Adaptive Equipment: Walker (comment)(FWW)   BALANCE BALANCE   Sitting: With support  Sitting - Static: Fair (occasional)  Sitting - Dynamic: Fair (occasional)  Standing: With support, Impaired  Standing - Static: Fair  Standing - Dynamic : Fair(-) Sitting: With support  Sitting - Static: Fair (occasional)  Sitting - Dynamic: Fair (occasional)  Standing: With support  Standing - Static: Fair  Standing - Dynamic : Fair       GROSS ASSESSMENT  GROSS ASSESSMENT   AROM: Generally decreased, functional  PROM: Generally decreased, functional  Strength: Generally decreased, functional(R hip 2-5, R knee 4+/5; L hip 4-/5, L knee 4+/5)  Coordination: Generally decreased, functional  Tone: Normal  Sensation: Intact(BUEs) AROM: Within functional limits(BUEs)  PROM: Generally decreased, functional  Strength: Generally decreased, functional(BUEs 4-/5)  Coordination: Within functional limits(BUEs)  Tone: Normal(BUEs)  Sensation: Intact(BUEs)   COORDINATION COORDINATION   Fine Motor Skills-Upper: Left Intact, Right Intact  Gross Motor Skills-Upper: Left Intact, Right Intact Fine Motor Skills-Upper: Left Intact, Right Intact  Gross Motor Skills-Upper: Left Intact, Right Intact   VISUAL/PERCEPTUAL VISUAL/PERCEPTUAL   Corrective Lenses: Reading glasses   Corrective Lenses: Reading glasses     AUDITORY: AUDITORY:   Auditory Impairment: Hard of hearing, bilateral Auditory Impairment: Hard of hearing, left side INSTRUMENTAL  ADL'S:    INSTRUMENTAL ADL'S:          THE BARTHEL INDEX  ACTIVITY   SCORE   FEEDING  0=unable  5=needs help cutting,spreading butter,etc., or modified diet  10= independent   10   BATHING  0=dependent  5=independent (or in shower   0   GROOMING  0=needs help  5=independent face/hair/teeth/shaving (implements provided)   5   DRESSING  0=dependent  5=needs help but can do about half unaided  10=independent(including buttons, zips,laces etc.)   5   BOWELS  0=incontinent  5=occasional accident  10=continent   10   BLADDER  0=incontinent, or catheterized and unable to manage alone  5=occasional accident  10=continent   10   TOILET USE  0=dependent  5=needs some help, but can do something alone  10=independent (on and off, dressing, wiping)   5   TRANSFER (BED TO CHAIR AND BACK)  0=unable, no sitting balance  5=major help(one or two people,physical), can sit  10=minor help(verbal or physical)  15=independent   10   MOBILITY (ON LEVEL SURFACES)  0=immobile or <50 yards  5=wheelchair independent,including corners,>50 yards  10=walkes with help of one person (verbal or physical) >50 yards  15=independent(but may use any aid; for example, stick) >50 yards   5   STAIRS  0=unable  5=needs help (verbal, physical, carrying aid)  10=independent   0            TOTAL:                  60/100     Treatment:  Educated pt and caregiver (supportive spouse) on LB clothing options available for pt to obtain maximum independence with self-care task. Educated pt on use of reacher to assist with LB dressing. Will need reinforcement. Pt participated in BUE strengthening exercises for carryover to functional transfers/tasks. Pt used 3# dowel to perform shoulder press, chest press, bicep curls, horizontal shoulder add-/abduction, forward rows, backward rows (2 sets x 10 reps each exercise. Pt and caregiver asked about pt receiving shower during treatment session.  Pt would benefit from learning UB/LB bathing strategies for when returning to home environment. Pt is limited with possible safety as pt has difficulty remembering weight bearing status. Patient's response to Treatment rendered:  Pt and caregiver participated in treatment session this date and review of OT POC and the progress he is making towards goals with completion of weekly progress note for client-centered approach. Patient expected Discharge Location:  ? Private Residence  ? snf/ILF  ? LTC  ? Other:    Plan: Continue OT services as established on the Plan of Care for 3-6 times a week. Treatment Minutes:  36  OT and Assistant have had a weekly case conference regarding the above treatment:  ? Yes     ?  No    Therapist:   ANTELMO Sevilla,         Date:5/20/2019     Forward to OT for co-signature when completed

## 2019-05-20 NOTE — PROGRESS NOTES
I have reviewed this patient's current medication list and recent laboratory results. At this time, I do not suggest any drug therapy adjustments or additional laboratory monitoring. Thank you,  Reji CAMARGO.  Ph. M. S.  5/20/2019

## 2019-05-20 NOTE — ROUTINE PROCESS
Patient continues to get up out of bed unassisted, staff redirects and educates the importance of using call bell for assistance, pt has been non compliant. Bed alarm is set on bed, staff unable to reach pt before getting to bathroom, pt becomes agitated when staff educates on fall risk.

## 2019-05-21 LAB
GLUCOSE BLD STRIP.AUTO-MCNC: 112 MG/DL (ref 70–110)
GLUCOSE BLD STRIP.AUTO-MCNC: 136 MG/DL (ref 70–110)
GLUCOSE BLD STRIP.AUTO-MCNC: 160 MG/DL (ref 70–110)
GLUCOSE BLD STRIP.AUTO-MCNC: 209 MG/DL (ref 70–110)

## 2019-05-21 PROCEDURE — 82962 GLUCOSE BLOOD TEST: CPT

## 2019-05-21 PROCEDURE — 74011636637 HC RX REV CODE- 636/637: Performed by: INTERNAL MEDICINE

## 2019-05-21 PROCEDURE — 74011250637 HC RX REV CODE- 250/637: Performed by: NURSE PRACTITIONER

## 2019-05-21 PROCEDURE — 74011250637 HC RX REV CODE- 250/637: Performed by: INTERNAL MEDICINE

## 2019-05-21 RX ADMIN — SIMVASTATIN 20 MG: 20 TABLET, FILM COATED ORAL at 22:12

## 2019-05-21 RX ADMIN — PANTOPRAZOLE SODIUM 40 MG: 40 TABLET, DELAYED RELEASE ORAL at 07:30

## 2019-05-21 RX ADMIN — MAGNESIUM HYDROXIDE 30 ML: 400 SUSPENSION ORAL at 03:00

## 2019-05-21 RX ADMIN — Medication 1000 MG: at 09:00

## 2019-05-21 RX ADMIN — POLYETHYLENE GLYCOL 3350 17 G: 17 POWDER, FOR SOLUTION ORAL at 08:42

## 2019-05-21 RX ADMIN — METFORMIN HYDROCHLORIDE 1000 MG: 500 TABLET ORAL at 17:24

## 2019-05-21 RX ADMIN — DOCUSATE SODIUM 100 MG: 100 CAPSULE, LIQUID FILLED ORAL at 08:42

## 2019-05-21 RX ADMIN — INSULIN LISPRO 2 UNITS: 100 INJECTION, SOLUTION INTRAVENOUS; SUBCUTANEOUS at 22:21

## 2019-05-21 RX ADMIN — WARFARIN SODIUM 2 MG: 2 TABLET ORAL at 17:24

## 2019-05-21 RX ADMIN — CHOLECALCIFEROL (VITAMIN D3) 25 MCG (1,000 UNIT) TABLET 1000 UNITS: at 08:43

## 2019-05-21 RX ADMIN — METFORMIN HYDROCHLORIDE 1000 MG: 500 TABLET ORAL at 08:00

## 2019-05-21 RX ADMIN — INSULIN LISPRO 4 UNITS: 100 INJECTION, SOLUTION INTRAVENOUS; SUBCUTANEOUS at 12:45

## 2019-05-21 NOTE — ROUTINE PROCESS
Prn milk of magnesia given, last bowel movement recorded 5/18/2019, also patient stated that his last bowel movement was 3 days ago, and would like to take a medication to help him pass stool.

## 2019-05-21 NOTE — PROGRESS NOTES
Nutrition follow-up/  Plan of care TCC      RECOMMENDATIONS:     1. Diabetic Consistent Carb diet  2. Monitor weight, labs and PO intake  3. RD to follow     GOALS:     1. Ongoing: PO intake meets >75% of protein/calorie needs by 5/28  2. Ongoing: Weight Maintenance (+/- 1-2 lb) by 5/28    ASSESSMENT:     Weight status is classified as normal per Body mass index is 20.05 kg/m². PO intake is fair. Labs noted. BG range (136-172) over the past 24 hours; A1c (6.8). Pt w/ elevated BUN. Nutrition recommendations listed. RD to follow. Nutrition Risk:  [] High  [] Moderate [x]  Low    SUBJECTIVE/OBJECTIVE:   (5/21): Variable but fair appetite. Pt seen in room OOB in chair at lunch with wife at bedside. Observed 55% of meal consumed. Last BM on (5/18) per I/Os and noted MOM given early this morning. No new weight on record as of yet. Will continue to monitor. (5/14): Pt has a variable appetite but fair overall. Observed 45% of a very light lunch consumed but had a good breakfast this morning. Last BM (5/14). Weight stable from last week but noted a 7 lb or 4.9% loss since admission weight 2 weeks ago. Continue to encourage intake and will monitor.     (5/7): Current weight on record showing a 6 lb or 4.2% loss since admission x 1 week ago. Noted stable weight 140-145 lb PTA. Last BM (5/5) and on bowel regimen. Pt seen in room later in the day. Stated his appetite/PO intake has been normal, but he has never been a big eater. Per RN Pt has been consuming ~50% of meals. Encouraged intake and will continue to follow. (4/30): Transferred from [address] on 4/29. Admitted s/p fall with right hip fracture. S/P right hip percutaneous pinning on 4/26. Has history of Alzheimer's disease, diabetes, HTN and a-fib. Patient reports having a good appetite and weight was stable at 145-150 lb PTA. Observed 75% intake of breakfast meal during visit. Denies food allergies and problems with chewing/swallowing. Will monitor. Information Obtained from:    [x] Chart Review   [x] Patient   [] Family/Caregiver   [x] Nurse/Physician   [] Interdisciplinary Meeting/Rounds    Diet: Diabetic Consistent Carb diet  Medications: [x] Reviewed    Allergies: [x] Reviewed   Patient Active Problem List   Diagnosis Code    Hip fracture ([facility] S72.009A    Closed right hip fracture, initial encounter ([place] [address].) S72.001A    Atrial fibrillation ([place] [address].) I48.91    HTN (hypertension) I10     Past Medical History:   Diagnosis Date    Alzheimer's disease     Atrial fibrillation ([facility]     Diabetes ([facility]     Hypertension       Labs:    Lab Results   Component Value Date/Time    Sodium 137 05/20/2019 04:10 AM    Potassium 4.8 05/20/2019 04:10 AM    Chloride 104 05/20/2019 04:10 AM    CO2 25 05/20/2019 04:10 AM    Anion gap 8 05/20/2019 04:10 AM    Glucose 138 (H) 05/20/2019 04:10 AM    BUN 22 (H) 05/20/2019 04:10 AM    Creatinine 0.86 05/20/2019 04:10 AM    Calcium 8.5 05/20/2019 04:10 AM     Anthropometrics: BMI (calculated): 20  Last 3 Recorded Weights in this Encounter    04/29/19 1920 05/06/19 1103 05/14/19 1620   Weight: 64.4 kg (142 lb) 61.8 kg (136 lb 3.2 oz) 61.6 kg (135 lb 12.8 oz)      Ht Readings from Last 1 Encounters:   04/29/19 5' 9\" (1.753 m)     Weight Metrics 5/14/2019 4/26/2019   Weight 135 lb 12.8 oz 140 lb   BMI 20.05 kg/m2 22.6 kg/m2     No data found.     UBW: 145-150 lb  [x] Weight Loss (7 lb or 4.9% since admission x 2 weeks)  [] Weight Gain  [x] Weight Stable PTA per Pt    Estimated Nutrition Needs: [x] MSJ    Calories: 1700 Kcal Based on:   [x] Actual BW    Protein:   70-84 g Based on:   [x] Actual BW    Fluid:       9931-4320 ml Based on:   [x] Actual BW      Nutrition Problems Identified:   [x] Suboptimal PO intake (variable but fair)  [] Food Allergies  [] Difficulty chewing/swallowing/poor dentition  [] Constipation/Diarrhea   [] Nausea/Vomiting   [] None  [] Other:     Plan:   [x] Therapeutic Diet  [x]  Obtained/adjusted food preferences/tolerances and/or snacks options   []  Supplements added   [] Occupational therapy following for feeding techniques  []  HS snack added   []  Modify diet texture   []  Modify diet for food allergies   []  Educate patient   []  Assist with menu selection   [x]  Monitor PO intake on meal rounds   [x]  Continue inpatient monitoring and intervention   [x]  Participated in discharge planning/Interdisciplinary rounds/Team meetings   []  Other:     Education Needs:   [] Not appropriate for teaching at this time due to:   [x] Identified and addressed    Nutrition Monitoring and Evaluation:  [x] Continue ongoing monitoring and intervention  [] Other    Kristie Craig

## 2019-05-22 LAB
GLUCOSE BLD STRIP.AUTO-MCNC: 116 MG/DL (ref 70–110)
GLUCOSE BLD STRIP.AUTO-MCNC: 141 MG/DL (ref 70–110)
GLUCOSE BLD STRIP.AUTO-MCNC: 172 MG/DL (ref 70–110)
GLUCOSE BLD STRIP.AUTO-MCNC: 255 MG/DL (ref 70–110)
GLUCOSE BLD STRIP.AUTO-MCNC: 87 MG/DL (ref 70–110)

## 2019-05-22 PROCEDURE — 74011250637 HC RX REV CODE- 250/637: Performed by: INTERNAL MEDICINE

## 2019-05-22 PROCEDURE — 74011250637 HC RX REV CODE- 250/637: Performed by: NURSE PRACTITIONER

## 2019-05-22 PROCEDURE — 82962 GLUCOSE BLOOD TEST: CPT

## 2019-05-22 PROCEDURE — 74011636637 HC RX REV CODE- 636/637: Performed by: INTERNAL MEDICINE

## 2019-05-22 RX ADMIN — DOCUSATE SODIUM 100 MG: 100 CAPSULE, LIQUID FILLED ORAL at 10:25

## 2019-05-22 RX ADMIN — POLYETHYLENE GLYCOL 3350 17 G: 17 POWDER, FOR SOLUTION ORAL at 10:25

## 2019-05-22 RX ADMIN — CHOLECALCIFEROL (VITAMIN D3) 25 MCG (1,000 UNIT) TABLET 1000 UNITS: at 10:25

## 2019-05-22 RX ADMIN — SIMVASTATIN 20 MG: 20 TABLET, FILM COATED ORAL at 22:17

## 2019-05-22 RX ADMIN — METFORMIN HYDROCHLORIDE 1000 MG: 500 TABLET ORAL at 17:25

## 2019-05-22 RX ADMIN — METFORMIN HYDROCHLORIDE 1000 MG: 500 TABLET ORAL at 10:25

## 2019-05-22 RX ADMIN — OXYCODONE HYDROCHLORIDE AND ACETAMINOPHEN 2 TABLET: 10; 325 TABLET ORAL at 10:25

## 2019-05-22 RX ADMIN — INSULIN LISPRO 3 UNITS: 100 INJECTION, SOLUTION INTRAVENOUS; SUBCUTANEOUS at 22:19

## 2019-05-22 RX ADMIN — LISINOPRIL 10 MG: 10 TABLET ORAL at 10:25

## 2019-05-22 RX ADMIN — Medication 1000 MG: at 09:00

## 2019-05-22 RX ADMIN — PANTOPRAZOLE SODIUM 40 MG: 40 TABLET, DELAYED RELEASE ORAL at 10:25

## 2019-05-22 RX ADMIN — INSULIN LISPRO 6 UNITS: 100 INJECTION, SOLUTION INTRAVENOUS; SUBCUTANEOUS at 12:09

## 2019-05-22 RX ADMIN — WARFARIN SODIUM 2 MG: 2 TABLET ORAL at 17:25

## 2019-05-22 NOTE — PROGRESS NOTES
[facility]   PHYSICAL THERAPY DAILY TREATMENT NOTE      Patient: Mica Muhammad (25 y.o. male)               Date: 5/22/2019    Physician: Zeny Vera MD  Primary Diagnosis: rt hip fracture          Treatment Diagnosis  Treatment Diagnosis: need for assist with self care  Treatment Diagnosis 2: muscle weakness  Precautions: PWB(50% thru RLE)  Vital Signs  Vital Signs  Temp: 97.6 °F (36.4 °C)  Temp Source: Oral  Pulse (Heart Rate): 64  Resp Rate: 16  O2 Sat (%): 96 %  BP: 140/70  MAP (Calculated): 93     Cognitive Status:  Mental Status  Neurologic State: Alert  Orientation Level: Oriented to person  Cognition: Impulsive;Decreased attention/concentration  Pain     Bed Mobility Training     Balance  Sitting: With support  Sitting - Static: Fair (occasional)(+)  Sitting - Dynamic: Fair (occasional)(+)  Standing: With support  Standing - Static: Fair  Standing - Dynamic : Fair  Transfer Training  Transfer Training  Sit to Stand: Stand-by assistance  Stand to Sit: Stand-by assistance  Sit to Stand: Stand-by assistance  Gait Training  Gait  Base of Support: Center of gravity altered  Step Length: Left shortened;Right shortened  Gait Abnormalities: Decreased step clearance  Ambulation - Level of Assistance: Stand-by assistance  Distance (ft): 219 Feet (ft)  Assistive Device: Walker, rolling;Gait belt  Rail Use: Both  Stairs - Level of Assistance: Contact guard assistance  Number of Stairs Trained: 5     Gait Abnormalities: Decreased step clearance            With 5 turns. Treatment:    Pt's WB status updated to PWB 50%, pt's weight per chart is 135 lbs, with 67.5 lbs allowed thru RLE. Gait training as mentioned above, initiated with scale under RLE to ensure PWB compliance, pt 100% compliant staying around 60lbs during R stance phase. Gait training as mentioned above, minimal difficulty noted with obstacle negotiation, imrpoved without external cueing.  Stair training x 5 stairs using B HR with verbal cueing to lead with strong LE (LLE) when ascending and lead with RLE when descending. TE rendered to improve strength, endurance, mobility and included: LAQ, marching, hip add, hip abd using orange t-band 15 reps x 2 sets; attempted ankle weight thru LLE, pt c/o R pelvic pain, weight removed. Patient/Caregiver Education:   Pt /Caregiver Education on safety and fall prevention, including use of call bell for assistance. ASSESSMENT:  Patient continues to benefit from Skilled PT services to improve bed mobility, gait pattern, balance. Progression toward goals:  ?      Improving appropriately and progressing toward goals  ? Improving slowly and progressing toward goals  ? Not making progress toward goals and plan of care will be adjusted     Treatment session: 48 minutes.   Therapist:   Bridgett Diallo, PT,          5/22/2019

## 2019-05-22 NOTE — ROUTINE PROCESS
Even though bed alarm is set, Patient still gets up to the bathroom without calling for assist. Instructed to call but he never does. Educated on safety awareness and fall risk. Says he understands but he doesn't comply. Will continue to monitor.

## 2019-05-22 NOTE — PROGRESS NOTES
Mr. Sacha Unger participated in the sittercise activity today. We touched a ball in group and passed side to side. He was engaged and enjoyed the activity. Will continue to encourage activity attendance.   Signed By: Haley Tao     May 22, 2019

## 2019-05-23 LAB
GLUCOSE BLD STRIP.AUTO-MCNC: 158 MG/DL (ref 70–110)
GLUCOSE BLD STRIP.AUTO-MCNC: 166 MG/DL (ref 70–110)
GLUCOSE BLD STRIP.AUTO-MCNC: 181 MG/DL (ref 70–110)
GLUCOSE BLD STRIP.AUTO-MCNC: 72 MG/DL (ref 70–110)
INR PPP: 2.5 (ref 0.8–1.2)
PROTHROMBIN TIME: 27.1 SEC (ref 11.5–15.2)

## 2019-05-23 PROCEDURE — 74011636637 HC RX REV CODE- 636/637: Performed by: INTERNAL MEDICINE

## 2019-05-23 PROCEDURE — 74011250637 HC RX REV CODE- 250/637: Performed by: NURSE PRACTITIONER

## 2019-05-23 PROCEDURE — 74011250637 HC RX REV CODE- 250/637: Performed by: INTERNAL MEDICINE

## 2019-05-23 PROCEDURE — 82962 GLUCOSE BLOOD TEST: CPT

## 2019-05-23 PROCEDURE — 85610 PROTHROMBIN TIME: CPT

## 2019-05-23 PROCEDURE — 36415 COLL VENOUS BLD VENIPUNCTURE: CPT

## 2019-05-23 RX ADMIN — OXYCODONE HYDROCHLORIDE AND ACETAMINOPHEN 2 TABLET: 10; 325 TABLET ORAL at 08:51

## 2019-05-23 RX ADMIN — METFORMIN HYDROCHLORIDE 1000 MG: 500 TABLET ORAL at 08:50

## 2019-05-23 RX ADMIN — WARFARIN SODIUM 2 MG: 2 TABLET ORAL at 17:38

## 2019-05-23 RX ADMIN — CHOLECALCIFEROL (VITAMIN D3) 25 MCG (1,000 UNIT) TABLET 1000 UNITS: at 08:50

## 2019-05-23 RX ADMIN — INSULIN LISPRO 2 UNITS: 100 INJECTION, SOLUTION INTRAVENOUS; SUBCUTANEOUS at 12:44

## 2019-05-23 RX ADMIN — PANTOPRAZOLE SODIUM 40 MG: 40 TABLET, DELAYED RELEASE ORAL at 07:30

## 2019-05-23 RX ADMIN — INSULIN LISPRO 2 UNITS: 100 INJECTION, SOLUTION INTRAVENOUS; SUBCUTANEOUS at 17:39

## 2019-05-23 RX ADMIN — DOCUSATE SODIUM 100 MG: 100 CAPSULE, LIQUID FILLED ORAL at 08:50

## 2019-05-23 RX ADMIN — POLYETHYLENE GLYCOL 3350 17 G: 17 POWDER, FOR SOLUTION ORAL at 08:50

## 2019-05-23 RX ADMIN — SIMVASTATIN 20 MG: 20 TABLET, FILM COATED ORAL at 21:50

## 2019-05-23 RX ADMIN — LISINOPRIL 10 MG: 10 TABLET ORAL at 09:00

## 2019-05-23 RX ADMIN — INSULIN LISPRO 2 UNITS: 100 INJECTION, SOLUTION INTRAVENOUS; SUBCUTANEOUS at 08:40

## 2019-05-23 RX ADMIN — METFORMIN HYDROCHLORIDE 1000 MG: 500 TABLET ORAL at 17:38

## 2019-05-23 RX ADMIN — Medication 1000 MG: at 09:00

## 2019-05-23 NOTE — PROGRESS NOTES
[facility] [facility]   PHYSICAL THERAPY DAILY TREATMENT NOTE      Patient: Ivette Abarca (67 y.o. male)               Date: 5/23/2019    Physician: Daily Valdez MD  Primary Diagnosis: rt hip fracture          Treatment Diagnosis  Treatment Diagnosis: need for assist with self care  Treatment Diagnosis 2: muscle weakness  Precautions: PWB(50% thru RLE)  Vital Signs  Vital Signs  Temp: 97.1 °F (36.2 °C)  Temp Source: Oral  Resp Rate: 16  O2 Sat (%): 96 %  BP: 122/66  MAP (Calculated): 85     Cognitive Status:  Mental Status  Neurologic State: Alert  Orientation Level: Oriented to person  Cognition: Impulsive; Impaired decision making  Pain     Bed Mobility Training     Balance     Transfer Training        Gait Training                     With 4 turns. Treatment:    Pt presents in bed, bed mobility training to improve quality of bed mobility and reduce need for assistance. Bed mobility completed with close supervision, pt uses hands to assist in management of RLE when exiting bed from R side, his preferred side. Transfers with close supervision, requires occasional verbal cueing for safe hand placement. Gait training x 240 ft, 176 ft using RW with verbal cueing to improve BUEs WB through RW during R stance phase, improved with cueing. Neuromuscular re-education including standing balance static and dynamic with single to no UE support during facilitation of reaching outside PIAN ~5 min ~4 min without swaying. TE rendered to address strength and mobility and included: marching, LAQ, resisted hip abd using orange t-band, resisted hip add 15 reps x 2 sets. Modified resistance band when pt c/o R pelvic discomfort. Patient/Caregiver Education:   Pt /Caregiver Education on PWB through RLE, pt modified standing, walking accordingly. ASSESSMENT:  Patient continues to benefit from Skilled PT services to improve dynamic standing balance, gait pattern.    Progression toward goals:  ?      Improving appropriately and progressing toward goals  ? Improving slowly and progressing toward goals  ? Not making progress toward goals and plan of care will be adjusted     Treatment session: 47 minutes.   Therapist:   Kerri Foss, PT,          5/23/2019

## 2019-05-23 NOTE — ROUTINE PROCESS
Bedside, Verbal and Written shift change report given to shaylee damon (oncoming nurse) by Melody Hair (offgoing nurse). Report included the following information SBAR, Intake/Output and MAR.

## 2019-05-23 NOTE — PROGRESS NOTES
I was not able to assess the patient at this time and will perform a follow up visit in a few days. Destinee Vasquez M.Div.   , [address] [facility]: [phone]/[facility]: [phone]

## 2019-05-24 LAB
GLUCOSE BLD STRIP.AUTO-MCNC: 133 MG/DL (ref 70–110)
GLUCOSE BLD STRIP.AUTO-MCNC: 140 MG/DL (ref 70–110)
GLUCOSE BLD STRIP.AUTO-MCNC: 146 MG/DL (ref 70–110)
GLUCOSE BLD STRIP.AUTO-MCNC: 254 MG/DL (ref 70–110)

## 2019-05-24 PROCEDURE — 74011250637 HC RX REV CODE- 250/637: Performed by: NURSE PRACTITIONER

## 2019-05-24 PROCEDURE — 82962 GLUCOSE BLOOD TEST: CPT

## 2019-05-24 PROCEDURE — 74011636637 HC RX REV CODE- 636/637: Performed by: INTERNAL MEDICINE

## 2019-05-24 PROCEDURE — 74011250637 HC RX REV CODE- 250/637: Performed by: INTERNAL MEDICINE

## 2019-05-24 RX ADMIN — WARFARIN SODIUM 2 MG: 2 TABLET ORAL at 17:36

## 2019-05-24 RX ADMIN — OXYCODONE HYDROCHLORIDE AND ACETAMINOPHEN 2 TABLET: 10; 325 TABLET ORAL at 08:53

## 2019-05-24 RX ADMIN — SIMVASTATIN 20 MG: 20 TABLET, FILM COATED ORAL at 21:33

## 2019-05-24 RX ADMIN — OXYCODONE HYDROCHLORIDE AND ACETAMINOPHEN 2 TABLET: 10; 325 TABLET ORAL at 21:33

## 2019-05-24 RX ADMIN — Medication 1000 MG: at 09:00

## 2019-05-24 RX ADMIN — METFORMIN HYDROCHLORIDE 1000 MG: 500 TABLET ORAL at 17:37

## 2019-05-24 RX ADMIN — LISINOPRIL 10 MG: 10 TABLET ORAL at 08:52

## 2019-05-24 RX ADMIN — METFORMIN HYDROCHLORIDE 1000 MG: 500 TABLET ORAL at 08:52

## 2019-05-24 RX ADMIN — INSULIN LISPRO 6 UNITS: 100 INJECTION, SOLUTION INTRAVENOUS; SUBCUTANEOUS at 13:16

## 2019-05-24 RX ADMIN — DOCUSATE SODIUM 100 MG: 100 CAPSULE, LIQUID FILLED ORAL at 08:52

## 2019-05-24 RX ADMIN — POLYETHYLENE GLYCOL 3350 17 G: 17 POWDER, FOR SOLUTION ORAL at 08:54

## 2019-05-24 RX ADMIN — CHOLECALCIFEROL (VITAMIN D3) 25 MCG (1,000 UNIT) TABLET 1000 UNITS: at 08:52

## 2019-05-24 RX ADMIN — PANTOPRAZOLE SODIUM 40 MG: 40 TABLET, DELAYED RELEASE ORAL at 08:52

## 2019-05-24 NOTE — PROGRESS NOTES
Problem: Self Care Deficits Care Plan (Adult)  Goal: *Acute Goals and Plan of Care (Insert Text)  Description  -CLOF: OCCUPATIONAL THERAPY SHORT TERM GOALS        Updated 5/13/19    1. Patient will perform Upper body ADL's without adaptive equipment with modified independence. 2.  Patient will perform Lower body ADL's with adaptive equipment with moderate assistance. 3.  Patient will perform toileting task with moderate assistance  with Good safety to reduce falls risk. 4.  Patient will perform bedside commode transfers with Summer Florina and contact guard assistance while adhering to RLE TTWB. 5.  Patient will perform standing static/dynamic balance activities for improved ADL function with minimal assistance and Fair+ balance and safety awareness while adhering to RLE TTWB. 6.  Patient will improve Barthel index scores to atleast 13111 to improve functional mobility. 7.  Patient will utilize energy conservation techniques during functional activities with minimal verbal cues. Updated 5/6/19    1. Patient will perform Upper body ADL's without adaptive equipment with modified independence. -CLOF: bathing w/ SBA, dressing w/ Min A  2. Patient will perform Lower body ADL's with adaptive equipment with moderate assistance. -CLOF: bathing w/ Mod A , dressing w/ Max A using AE: reacher  3. Patient will perform toileting task with moderate assistance  with Good safety to reduce falls risk. -CLOF: Mod I bladder mgmt using urinal, Dep clothing mgmt and bowel mgmt  4. Patient will perform bedside commode transfers with Rolling Walker and minimal assistance while adhering to RLE TTWB. - CLOF: Min A w/ RW adhering to R LE TTWB, upgrade  5. Patient will perform standing static/dynamic balance activities for improved ADL function with minimal assistance and Fair+ balance and safety awareness while adhering to RLE TTWB.-CLOF: Fair- static, Fair- dynamic w/ RW and CGA - Min A  6.   Patient will improve Barthel index scores to atleast 42608 to improve functional mobility. - CLOF: /100  7. Patient will utilize energy conservation techniques during functional activities with minimal verbal cues. - CLOF: Max verbal cues     Initiated 4/30/2019 and to be accomplished within 2 Week(s)    1. Patient will perform Upper body ADL's without adaptive equipment with modified independence. -CLOF: bathing and dressing w/ set up  2. Patient will perform Lower body ADL's with adaptive equipment with moderate assistance. -CLOF: bathing and dressing w/ Max A  3. Patient will perform toileting task with moderate assistance  with Good safety to reduce falls risk. -CLOF: Mod I w/ bladder mgmt using urinal. Dep bowel mgmt, Max A clothing mgmt  4. Patient will perform bedside commode transfers with Rolling Walker and minimal assistance while adhering to RLE TTWB.-CLOF: CGA w/ RW while adhering to RLE TTWB  5. Patient will perform standing static/dynamic balance activities for improved ADL function with minimal assistance and Fair+ balance and safety awareness while adhering to RLE TTWB.-CLOF: Karmen static and Fair dynamic w/ RW & CGA while adhering to RLE TTWB  6. Patient will improve Barthel index scores to atleast 23818 to improve functional mobility. -CLOF: 55/100  7. Patient will utilize energy conservation techniques during functional activities with minimal verbal cues. -CLOF:  Max verbal cues    OCCUPATIONAL THERAPY LONG TERM GOALS   Initiated 4/30/2019 and to be accomplished within 4 Week(s)    1. Patient will perform ADL's with adaptive equipment as needed with modified independence. 2.  Patient will perform toileting task with modified independence with Good safety to reduce falls risk. 3.  Patient will perform all functional transfers utilizing least restrictive device and durable medical equipment as needed with modified independence and Good balance and safety awareness.   4.  Patient will perform standing static/dynamic activity for improved ADL function with modified independence and Good balance and safety awareness. 5.  Patient will improve Barthel index score to 95/100 to improve independence with mobility. 6. Patient will perform UE HEP with Modified [place] to increase BUE strength for continued participation in ADLs. Therapist: Luca Walker    4/30/2019                Outcome: Progressing Towards Goal   [facility]   OCCUPATIONAL THERAPY DAILY TREATMENT NOTE      Patient: Buddy Her (48 y.o. male)       Date: 5/24/2019  Attending Physician: Dustin Her MD  Primary Diagnosis: rt hip fracture    Treatment Diagnosis  Treatment Diagnosis: need for assist with self care  Treatment Diagnosis 2: muscle weakness   Precautions : Precautions at Admission: PWB(50% thru RLE)  Vital Signs:  Vital Signs  Temp: 97.7 °F (36.5 °C)  Temp Source: Oral  Pulse (Heart Rate): 61  Resp Rate: 18  O2 Sat (%): 97 %  BP: 113/70  MAP (Calculated): 84     .     Balance:  Balance  Standing: With support  Standing - Static: Fair  Standing - Dynamic : Fair      Therapeutic Activities:  Pt performed w/c propulsion with BUE and SUP x 100 ft without rest break to increase independence in functional mobility. Pt participated in sit>stand and standing activity with walker and education on TTWB RLE with pt demonstrating fair carryover. Pt able to stand 3 min while engaged in puzzle activity. Pt stood a second time with F balance x3 min with fair carryover of WB. Pt performed  strengthening with B hands using 7lb  strengthener and then 9 lb  strengthener 2x10. Pt performed to increase dynamic standing balance and strength for I in self care. Patient/Caregiver Education:    Pt. Berkley Douglas Education on safe transfer techniques was provided to  increase pt independence.       ASSESSMENT:  Patient continues to demonstrate the need for skilled Occupational Therapy services to improve  bathing, lower body dressing, toileting, toilet transfer and shower transfer  Progression toward goals:  ?      Improving appropriately and progressing toward goals  ? Improving slowly and progressing toward goals  ?       Not making progress toward goals and plan of care will be adjusted     Treatment session:   30 minutes    Therapist:    Melanie Waggoner OT,  5/24/2019

## 2019-05-24 NOTE — PROGRESS NOTES
[address] [facility]   PHYSICAL THERAPY DAILY TREATMENT NOTE      Patient: Clover Kelly (78 y.o. male)               Date: 5/24/2019    Physician: Suzy Mayo MD  Primary Diagnosis: rt hip fracture          Treatment Diagnosis  Treatment Diagnosis: need for assist with self care  Treatment Diagnosis 2: muscle weakness  Precautions: PWB(50% thru RLE)  Vital Signs  Vital Signs  Temp: 97.7 °F (36.5 °C)  Temp Source: Oral  Pulse (Heart Rate): 61  Resp Rate: 18  O2 Sat (%): 97 %  BP: 113/70  MAP (Calculated): 84  Cognitive Status:  Pain  Bed Mobility Training  Balance  Sitting: With support  Standing: With support  Standing - Static: Fair  Standing - Dynamic : Fair  Transfer Training  Transfer Training  Sit to Stand: Stand-by assistance  Stand to Sit: Stand-by assistance  Sit to Stand: Stand-by assistance  Gait Training  Gait  Base of Support: Center of gravity altered  Step Length: Right shortened;Left shortened  Gait Abnormalities: Decreased step clearance  Ambulation - Level of Assistance: Stand-by assistance  Distance (ft): 225 Feet (ft)(+145)  Assistive Device: Gait belt;Walker, rolling  Rail Use: Both  Stairs - Level of Assistance: Contact guard assistance  Number of Stairs Trained: 5  Gait Abnormalities: Decreased step clearance  Right Side Weight Bearing: Partial (%)    With 3 turns. Treatment: Gait training with abovementioned details and cue for PWB on R LE. Pt required seated rest break between trails and w/c follow provided for safety. Pt negotiated 5 steps with B HR and CGA, verbal cues for sequencing and PWB required throughout. Seated B LE TE's rendered to promote strength and increase overall functional capacity for improved functional mobility: HR/TR 2x20, LAQ, hip flexion, ball squeezes, resisted hip abd (orange band) 2x15. Pt required intermittent verbal and occasional visual cues for proper technique with all TE's to maximize functional gains.  Transfer training provided to promote independence. Pt completed w/c<>armchair transfers x3 with RW and close (S), pt with good carryover following instructions to reach back for chair and for PWB on R LE. Static standing balance with 1 UE support and good PWB compliance noted with reaching slightly forward out of PINA with SBA. Pt stood for trails x2, x2'15\" with seated rest break between due to fatigue. Patient/Caregiver Education:   Pt /Caregiver Education on safety and fall prevention. Pt provided with cues for PWB throughout session. ASSESSMENT:  Patient continues to benefit from Skilled PT services to improve bed mobility, sit<>stand, transfers, strength, balance, gait and stair negotiation. Progression toward goals:  ?      Improving appropriately and progressing toward goals  ? Improving slowly and progressing toward goals  ? Not making progress toward goals and plan of care will be adjusted     Treatment session: 48 minutes.   Therapist:   Chalo Davidson PTA,          5/24/2019

## 2019-05-24 NOTE — ROUTINE PROCESS
Bedside, Verbal and Written shift change report given to shaylee damon (oncoming nurse) by Ernst Hong (offgoing nurse). Report included the following information SBAR, Intake/Output and MAR.

## 2019-05-24 NOTE — PROGRESS NOTES
Mr. Jihan Ocasio and his wife watched the movie today. Mr. Jihan Ocasio came after therapy. He enjoyed the popcorn as well. Will continue to encourage to attend activities.

## 2019-05-25 LAB
GLUCOSE BLD STRIP.AUTO-MCNC: 130 MG/DL (ref 70–110)
GLUCOSE BLD STRIP.AUTO-MCNC: 142 MG/DL (ref 70–110)
GLUCOSE BLD STRIP.AUTO-MCNC: 176 MG/DL (ref 70–110)
GLUCOSE BLD STRIP.AUTO-MCNC: 194 MG/DL (ref 70–110)

## 2019-05-25 PROCEDURE — 82962 GLUCOSE BLOOD TEST: CPT

## 2019-05-25 PROCEDURE — 74011636637 HC RX REV CODE- 636/637: Performed by: INTERNAL MEDICINE

## 2019-05-25 PROCEDURE — 74011250637 HC RX REV CODE- 250/637: Performed by: NURSE PRACTITIONER

## 2019-05-25 PROCEDURE — 74011250637 HC RX REV CODE- 250/637: Performed by: INTERNAL MEDICINE

## 2019-05-25 RX ADMIN — METFORMIN HYDROCHLORIDE 1000 MG: 500 TABLET ORAL at 17:37

## 2019-05-25 RX ADMIN — WARFARIN SODIUM 2 MG: 2 TABLET ORAL at 17:37

## 2019-05-25 RX ADMIN — INSULIN LISPRO 2 UNITS: 100 INJECTION, SOLUTION INTRAVENOUS; SUBCUTANEOUS at 12:28

## 2019-05-25 RX ADMIN — POLYETHYLENE GLYCOL 3350 17 G: 17 POWDER, FOR SOLUTION ORAL at 09:00

## 2019-05-25 RX ADMIN — LISINOPRIL 10 MG: 10 TABLET ORAL at 09:52

## 2019-05-25 RX ADMIN — INSULIN LISPRO 2 UNITS: 100 INJECTION, SOLUTION INTRAVENOUS; SUBCUTANEOUS at 21:35

## 2019-05-25 RX ADMIN — PANTOPRAZOLE SODIUM 40 MG: 40 TABLET, DELAYED RELEASE ORAL at 09:52

## 2019-05-25 RX ADMIN — DOCUSATE SODIUM 100 MG: 100 CAPSULE, LIQUID FILLED ORAL at 09:52

## 2019-05-25 RX ADMIN — CHOLECALCIFEROL (VITAMIN D3) 25 MCG (1,000 UNIT) TABLET 1000 UNITS: at 09:52

## 2019-05-25 RX ADMIN — METFORMIN HYDROCHLORIDE 1000 MG: 500 TABLET ORAL at 09:52

## 2019-05-25 RX ADMIN — SIMVASTATIN 20 MG: 20 TABLET, FILM COATED ORAL at 21:34

## 2019-05-25 NOTE — ROUTINE PROCESS
Bedside and Verbal shift change report given to Suad (oncoming nurse) by Denver Kebede RN (offgoing nurse). Report included the following information SBAR, Kardex and MAR.

## 2019-05-25 NOTE — ROUTINE PROCESS
Bedside and Verbal shift change report given to [place] lpn (oncoming nurse) by yumiko jimenez (offgoing nurse). Report included the following information Kardex, MAR and Recent Results.

## 2019-05-26 LAB
GLUCOSE BLD STRIP.AUTO-MCNC: 121 MG/DL (ref 70–110)
GLUCOSE BLD STRIP.AUTO-MCNC: 134 MG/DL (ref 70–110)
GLUCOSE BLD STRIP.AUTO-MCNC: 148 MG/DL (ref 70–110)
GLUCOSE BLD STRIP.AUTO-MCNC: 248 MG/DL (ref 70–110)
GLUCOSE BLD STRIP.AUTO-MCNC: 273 MG/DL (ref 70–110)

## 2019-05-26 PROCEDURE — 74011636637 HC RX REV CODE- 636/637: Performed by: INTERNAL MEDICINE

## 2019-05-26 PROCEDURE — 74011250637 HC RX REV CODE- 250/637: Performed by: NURSE PRACTITIONER

## 2019-05-26 PROCEDURE — 82962 GLUCOSE BLOOD TEST: CPT

## 2019-05-26 PROCEDURE — 74011250637 HC RX REV CODE- 250/637: Performed by: INTERNAL MEDICINE

## 2019-05-26 RX ADMIN — LISINOPRIL 10 MG: 10 TABLET ORAL at 08:00

## 2019-05-26 RX ADMIN — METFORMIN HYDROCHLORIDE 1000 MG: 500 TABLET ORAL at 17:42

## 2019-05-26 RX ADMIN — METFORMIN HYDROCHLORIDE 1000 MG: 500 TABLET ORAL at 08:00

## 2019-05-26 RX ADMIN — PANTOPRAZOLE SODIUM 40 MG: 40 TABLET, DELAYED RELEASE ORAL at 07:52

## 2019-05-26 RX ADMIN — SIMVASTATIN 20 MG: 20 TABLET, FILM COATED ORAL at 21:22

## 2019-05-26 RX ADMIN — WARFARIN SODIUM 2 MG: 2 TABLET ORAL at 17:42

## 2019-05-26 RX ADMIN — CHOLECALCIFEROL (VITAMIN D3) 25 MCG (1,000 UNIT) TABLET 1000 UNITS: at 08:00

## 2019-05-26 RX ADMIN — INSULIN LISPRO 4 UNITS: 100 INJECTION, SOLUTION INTRAVENOUS; SUBCUTANEOUS at 12:38

## 2019-05-26 RX ADMIN — DOCUSATE SODIUM 100 MG: 100 CAPSULE, LIQUID FILLED ORAL at 08:00

## 2019-05-26 NOTE — ROUTINE PROCESS
Has had no complaints this shift. Has been more compliant about calling out for assist to bathroom. Bed alarm set. Call light within reach. Personal belongings within reach.

## 2019-05-26 NOTE — ROUTINE PROCESS
Bedside and Verbal shift change report given to [place] lpn (oncoming nurse) by yumiko jimenez (offgoing nurse). Report included the following information Kardex, MAR and Recent Results.

## 2019-05-26 NOTE — PROGRESS NOTES
[address] [facility]   PHYSICAL THERAPY DAILY TREATMENT NOTE      Patient: Brian Jarrett (89 y.o. male)               Date: 5/26/2019    Physician: Jonathan Rios MD  Primary Diagnosis: rt hip fracture          Treatment Diagnosis  Treatment Diagnosis: need for assist with self care  Treatment Diagnosis 2: muscle weakness  Precautions: PWB(50% thru RLE)  Vital Signs  Vital Signs  Temp: 97.7 °F (36.5 °C)  Temp Source: Oral  Pulse (Heart Rate): 81  Resp Rate: 18  O2 Sat (%): 95 %  BP: 112/68  MAP (Calculated): 83  Cognitive Status:  Mental Status  Neurologic State: Alert  Orientation Level: Oriented X4  Pain  Pain Screen  Pain Scale 1: Numeric (0 - 10)  Pain Intensity 1: 0  Patient Stated Pain Goal: 0  Bed Mobility Training  Bed Mobility Training  Supine to Sit: Stand-by assistance; Additional time  Interventions: Safety awareness training;Verbal cues  Balance  Sitting: With support  Sitting - Static: Good (unsupported)  Sitting - Dynamic: Fair (occasional)(+)  Standing: With support  Standing - Static: Fair  Standing - Dynamic : Fair  Transfer Training  Transfer Training  Sit to Stand: Stand-by assistance  Stand to Sit: Stand-by assistance  Sit to Stand: Stand-by assistance  Gait Training  Gait  Base of Support: Center of gravity altered  Step Length: Left shortened;Right shortened  Gait Abnormalities: Decreased step clearance  Ambulation - Level of Assistance: Stand-by assistance  Distance (ft): 229 Feet (ft)  Assistive Device: Gait belt;Walker, rolling  Rail Use: Both  Stairs - Level of Assistance: Contact guard assistance  Number of Stairs Trained: 10(5x2 reps)  Interventions: Safety awareness training;Verbal cues; Visual/Demos  Gait Abnormalities: Decreased step clearance  Right Side Weight Bearing: Partial (%)(50%)  Treatment:   Pt participated in static/dynamic standing CGA/close supervision with UE reaching outside PINA during toileting task and 1-UE activity reaching in multiple planes with R LE under scale for visual cues to maintain PWB 50% on R LE throughout activity. Pt was compliant with maintaining WB precautions and placing 20-30 lbs on R LE during standing tasks. Pt ascend/descend 5 steps x2 reps CGA using B HR, requiring min VC's for proper LE stepping sequencing pattern and maintaining PWB on R LE in prep for safe entry of home upon d/c. Patient/Caregiver Education:   Pt /Caregiver Education on safety sequencing techniques during functional transfers/gait and importance of maintaining PWB at all times to promote healing and improve functional mobility with increase safety. Pt verbalized understanding but requires min reinforcement for compliance secondary to cognitive limitations. ASSESSMENT:  Patient continues to benefit from Skilled PT services to improve strength, endurance, balance, and functional mobility while maintaining WB precautions. Progression toward goals:  ?      Improving appropriately and progressing toward goals  ? Improving slowly and progressing toward goals  ? Not making progress toward goals and plan of care will be adjusted     Treatment session: 47 minutes.   Therapist:   Edith Watson PTA,          5/26/2019

## 2019-05-26 NOTE — ROUTINE PROCESS
Bedside shift change report given to JOAQUIN CarcamoRN (oncoming nurse) by Celina Sal RN (offgoing nurse). Report included the following information SBAR, Kardex and Med Rec Status. Qh visual checks and 24h chart checks done,

## 2019-05-27 LAB
ANION GAP SERPL CALC-SCNC: 9 MMOL/L (ref 3–18)
BASOPHILS # BLD: 0 K/UL (ref 0–0.1)
BASOPHILS NFR BLD: 1 % (ref 0–2)
BUN SERPL-MCNC: 18 MG/DL (ref 7–18)
BUN/CREAT SERPL: 20 (ref 12–20)
CALCIUM SERPL-MCNC: 8.6 MG/DL (ref 8.5–10.1)
CHLORIDE SERPL-SCNC: 104 MMOL/L (ref 100–108)
CO2 SERPL-SCNC: 24 MMOL/L (ref 21–32)
CREAT SERPL-MCNC: 0.89 MG/DL (ref 0.6–1.3)
DIFFERENTIAL METHOD BLD: ABNORMAL
EOSINOPHIL # BLD: 0.2 K/UL (ref 0–0.4)
EOSINOPHIL NFR BLD: 3 % (ref 0–5)
ERYTHROCYTE [DISTWIDTH] IN BLOOD BY AUTOMATED COUNT: 14.5 % (ref 11.6–14.5)
GLUCOSE BLD STRIP.AUTO-MCNC: 135 MG/DL (ref 70–110)
GLUCOSE BLD STRIP.AUTO-MCNC: 139 MG/DL (ref 70–110)
GLUCOSE BLD STRIP.AUTO-MCNC: 163 MG/DL (ref 70–110)
GLUCOSE BLD STRIP.AUTO-MCNC: 164 MG/DL (ref 70–110)
GLUCOSE SERPL-MCNC: 137 MG/DL (ref 74–99)
HCT VFR BLD AUTO: 34.7 % (ref 36–48)
HGB BLD-MCNC: 11.1 G/DL (ref 13–16)
INR PPP: 2.2 (ref 0.8–1.2)
LYMPHOCYTES # BLD: 1.6 K/UL (ref 0.9–3.6)
LYMPHOCYTES NFR BLD: 24 % (ref 21–52)
MCH RBC QN AUTO: 27.3 PG (ref 24–34)
MCHC RBC AUTO-ENTMCNC: 32 G/DL (ref 31–37)
MCV RBC AUTO: 85.5 FL (ref 74–97)
MONOCYTES # BLD: 0.7 K/UL (ref 0.05–1.2)
MONOCYTES NFR BLD: 11 % (ref 3–10)
NEUTS SEG # BLD: 4.1 K/UL (ref 1.8–8)
NEUTS SEG NFR BLD: 61 % (ref 40–73)
PLATELET # BLD AUTO: 282 K/UL (ref 135–420)
PMV BLD AUTO: 10.2 FL (ref 9.2–11.8)
POTASSIUM SERPL-SCNC: 4.6 MMOL/L (ref 3.5–5.5)
PROTHROMBIN TIME: 24.5 SEC (ref 11.5–15.2)
RBC # BLD AUTO: 4.06 M/UL (ref 4.7–5.5)
SODIUM SERPL-SCNC: 137 MMOL/L (ref 136–145)
WBC # BLD AUTO: 6.7 K/UL (ref 4.6–13.2)

## 2019-05-27 PROCEDURE — 80048 BASIC METABOLIC PNL TOTAL CA: CPT

## 2019-05-27 PROCEDURE — 74011636637 HC RX REV CODE- 636/637: Performed by: INTERNAL MEDICINE

## 2019-05-27 PROCEDURE — 85025 COMPLETE CBC W/AUTO DIFF WBC: CPT

## 2019-05-27 PROCEDURE — 36415 COLL VENOUS BLD VENIPUNCTURE: CPT

## 2019-05-27 PROCEDURE — 74011250637 HC RX REV CODE- 250/637: Performed by: INTERNAL MEDICINE

## 2019-05-27 PROCEDURE — 82962 GLUCOSE BLOOD TEST: CPT

## 2019-05-27 PROCEDURE — 85610 PROTHROMBIN TIME: CPT

## 2019-05-27 PROCEDURE — 74011250637 HC RX REV CODE- 250/637: Performed by: NURSE PRACTITIONER

## 2019-05-27 RX ADMIN — SIMVASTATIN 20 MG: 20 TABLET, FILM COATED ORAL at 21:31

## 2019-05-27 RX ADMIN — LISINOPRIL 10 MG: 10 TABLET ORAL at 08:57

## 2019-05-27 RX ADMIN — CHOLECALCIFEROL (VITAMIN D3) 25 MCG (1,000 UNIT) TABLET 1000 UNITS: at 08:57

## 2019-05-27 RX ADMIN — INSULIN LISPRO 2 UNITS: 100 INJECTION, SOLUTION INTRAVENOUS; SUBCUTANEOUS at 12:49

## 2019-05-27 RX ADMIN — METFORMIN HYDROCHLORIDE 1000 MG: 500 TABLET ORAL at 08:57

## 2019-05-27 RX ADMIN — PANTOPRAZOLE SODIUM 40 MG: 40 TABLET, DELAYED RELEASE ORAL at 08:57

## 2019-05-27 RX ADMIN — WARFARIN SODIUM 2 MG: 2 TABLET ORAL at 17:15

## 2019-05-27 RX ADMIN — DOCUSATE SODIUM 100 MG: 100 CAPSULE, LIQUID FILLED ORAL at 08:57

## 2019-05-27 RX ADMIN — POLYETHYLENE GLYCOL 3350 17 G: 17 POWDER, FOR SOLUTION ORAL at 08:57

## 2019-05-27 RX ADMIN — INSULIN LISPRO 2 UNITS: 100 INJECTION, SOLUTION INTRAVENOUS; SUBCUTANEOUS at 02:00

## 2019-05-27 RX ADMIN — METFORMIN HYDROCHLORIDE 1000 MG: 500 TABLET ORAL at 17:15

## 2019-05-27 RX ADMIN — OXYCODONE HYDROCHLORIDE AND ACETAMINOPHEN 2 TABLET: 10; 325 TABLET ORAL at 08:56

## 2019-05-27 RX ADMIN — Medication 1000 MG: at 09:00

## 2019-05-27 NOTE — ROUTINE PROCESS
Bedside and Verbal shift change report given to BRADLEY Abad LPN (oncoming nurse) by CHIARA Walton RN (offgoing nurse). Report included the following information SBAR, Kardex and MAR.

## 2019-05-27 NOTE — PROGRESS NOTES
Problem: Mobility Impaired (Adult and Pediatric)  Goal: *Acute Goals and Plan of Care (Insert Text)  Description  PHYSICAL THERAPY LTG GOALS :  Initiated 4/30/2019 and to be accomplished within 4 Weeks (updated 5/27/19)    1. Patient will move from supine to sit and sit to supine  and roll side to side in bed with modified independence.   (progressing; SBA)  2.  Patient will transfer from bed to chair and chair to bed with supervision/set-up using RW with WB compliance. (Maintaining at SBA)  3. Patient will perform sit to stand with supervision/set-up with Good balance and safety awareness. (Maintaining at SBA)  4. Patient will ambulate with supervision/set-up for 200 feet with RW on level surfaces and be able to maneuver through narrow spaces and obstacles without loss of balance with WB compliance. (Maintaining at SBA)  5. Patient will ascend/descend 3 stairs with NO handrail(s) with stand by assistance to allow for safe home access/exit. (Maintaining at; 3 stairs with BHR with CGA)  6. Patient will improve standardized test score for [place] Standing Balance Scale 4/5 to reduce fall risk. (Maintaining at; 3/5)     PHYSICAL THERAPY LTG GOALS :  Initiated 4/30/2019 and to be accomplished within 4 Weeks (updated 5/21/19)    1. Patient will move from supine to sit and sit to supine  and roll side to side in bed with modified independence.   (progressing; CGA/min A for RLE management)  2. Patient will transfer from bed to chair and chair to bed with supervision/set-up using RW with WB compliance. (Progressing; SBA)  3. Patient will perform sit to stand with supervision/set-up with Good balance and safety awareness. (Progresing; SBA)  4. Patient will ambulate with supervision/set-up for 200 feet with RW on level surfaces and be able to maneuver through narrow spaces and obstacles without loss of balance with WB compliance. (Progressing; SBA)  5.   Patient will ascend/descend 3 stairs with NO handrail(s) with stand by assistance to allow for safe home access/exit. (Progresing; 3 stairs with BHR with CGA)  6. Patient will improve standardized test score for [place] Standing Balance Scale 4/5 to reduce fall risk. (Progresing; 3/5)     PHYSICAL THERAPY STG GOALS :  Initiated 4/30/2019 and to be accomplished within 2 Weeks (Updated 5/21/19)    1. Patient will move from supine to sit and sit to supine  and roll side to side in bed with stand by assistance. (Progressing; CGA/Min A for RLE management)    2. Patient will transfer from bed to chair and chair to bed with contact guard assist using RW with WB compliance. (Achieved)  3. Patient will perform sit to stand with contact guard assist with Fair+ balance and safety awareness with WB compliance. (Achieved)   4. Patient will ambulate with contact guard assist for 75 feet with RW on level surfaces with 2 turns with WB compliance. (Achieved)   5. Patient will ascend/descend 1 step with bilateral handrail(s) with minimal assistance to allow for safe home access/exit with WB compliance. (Achieved with cueing for TTWB compliance, inside // bars)  6. Patient will improve standardized test score for [place] Standing Balance Scale 3/5 to reduce fall risk. (Achieved)    PHYSICAL THERAPY STG GOALS :  Initiated 4/30/2019 and to be accomplished within 2 Weeks (Updated 5/14/19)    1. Patient will move from supine to sit and sit to supine  and roll side to side in bed with stand by assistance. (Progressing; Min A for RLE management)    2. Patient will transfer from bed to chair and chair to bed with contact guard assist using RW with WB compliance. (Achieved)  3. Patient will perform sit to stand with contact guard assist with Fair+ balance and safety awareness with WB compliance. (Achieved)   4. Patient will ambulate with contact guard assist for 75 feet with RW on level surfaces with 2 turns with WB compliance. (Achieved)   5.   Patient will ascend/descend 1 step with bilateral handrail(s) with minimal assistance to allow for safe home access/exit with WB compliance. (Achieved with cueing for TTWB compliance, inside // bars)  6. Patient will improve standardized test score for [place] Standing Balance Scale 3/5 to reduce fall risk. (Maintained; 2+/5)    PHYSICAL THERAPY STG GOALS :  Initiated 4/30/2019 and to be accomplished within 2 Weeks (Updated 5/7/19)    1. Patient will move from supine to sit and sit to supine  and roll side to side in bed with stand by assistance. (Progressing; Min-Mod A)    2. Patient will transfer from bed to chair and chair to bed with contact guard assist using RW with WB compliance. (Achieved)  3. Patient will perform sit to stand with contact guard assist with Fair+ balance and safety awareness with WB compliance. (Progressing; CGA-Min A)   4. Patient will ambulate with contact guard assist for 75 feet with RW on level surfaces with 2 turns with WB compliance. (Progressing; 100ft with RW and CGA, cues for TTWB and no 1 turn)   5. Patient will ascend/descend 1 step with bilateral handrail(s) with minimal assistance to allow for safe home access/exit with WB compliance. (Not assessed due to increased difficulty with TTWB during ambulation)  6. Patient will improve standardized test score for [place] Standing Balance Scale 3/5 to reduce fall risk. (Progressing; 2+/5)    PHYSICAL THERAPY STG GOALS :  Initiated 4/30/2019 and to be accomplished within 2 Weeks    1. Patient will move from supine to sit and sit to supine  and roll side to side in bed with stand by assistance. 2.  Patient will transfer from bed to chair and chair to bed with contact guard assist using RW with WB compliance. 3.  Patient will perform sit to stand with contact guard assist with Fair+ balance and safety awareness with WB compliance.   4.  Patient will ambulate with contact guard assist for 75 feet with RW on level surfaces with 2 turns with WB compliance. 5.  Patient will ascend/descend 1 step with bilateral handrail(s) with minimal assistance to allow for safe home access/exit with WB compliance. 6.  Patient will improve standardized test score for [place] Standing Balance Scale 3/5 to reduce fall risk. PHYSICAL THERAPY LTG GOALS :  Initiated 4/30/2019 and to be accomplished within 4 Weeks    1. Patient will move from supine to sit and sit to supine  and roll side to side in bed with modified independence. 2.  Patient will transfer from bed to chair and chair to bed with supervision/set-up using RW with WB compliance. 3.  Patient will perform sit to stand with supervision/set-up with Good balance and safety awareness. 4.  Patient will ambulate with supervision/set-up for 200 feet with RW on level surfaces and be able to maneuver through narrow spaces and obstacles without loss of balance with WB compliance. 5.  Patient will ascend/descend 3 stairs with NO handrail(s) with stand by assistance to allow for safe home access/exit. 6.  Patient will improve standardized test score for [place] Standing Balance Scale 4/5 to reduce fall risk.       Physical Therapist:   Dona Turcios PT  on 4/30/2019                 Outcome: Progressing Towards Goal   [facility]   PHYSICAL THERAPY WEEKLY PROGRESS REPORT  Reporting Period:  Date: 5/21/19  to 5/27/19      Patient: Elisabeth Marie (54 y.o. male)                         Date: 5/27/2019    Primary Diagnosis: rt hip fracture      Attending Physician: Soniya Bruce MD   Treatment Diagnosis  Treatment Diagnosis: need for assist with self care  Treatment Diagnosis 2: muscle weakness  Precautions:  PWB(50% thru RLE)  Rehab Potential : Fair:    Skill interventions and education provided with clinical rationale (include individualized treatment techniques and standardized tests):   Skilled Physical Therapy services were provided with TA: to promote greater independence with bed mobility and transfers   TE to build strength and improved overall functional capacity for ease of mobility  Gait training for PWB compliance to allow pt to return to PLOF  Stair training to allow pt to safely return home upon DC  NMR of movement, coordination and balance for reduced risk of falls. Using a comparative statement, summarize significant progress toward goals as a result of skilled intervention provided:  Patient has made Fair progress towards their Physical Therapy goals in the areas of bed mobility, ambulation and stair negotiation. Identify remaining functional areas, impairments limiting progress and/or barriers to improvement:  Patient would benefit from continues PT services to address the following functional deficits in standing dynamic balance due to PWB on R LE. Pt limited by confusion and continued need for cues for safety and PWB compliance.      OBJECTIVE DATA SUMMARY:     INITIAL ASSESSMENT WEEKLY ASSESSMENT   COGNITIVE STATUS COGNITIVE STATUS   Neurologic State: Alert  Orientation Level: Oriented X4(forgetful at times)  Cognition: Follows commands  Perception: Appears intact  Perseveration: No perseveration noted  Safety/Judgement: Fall prevention Neurologic State: Alert  Orientation Level: Oriented to person, Oriented to place  Cognition: Impulsive, Poor safety awareness  Perception: Appears intact  Perseveration: No perseveration noted  Safety/Judgement: Fall prevention   PAIN PAIN   Pain Scale 1: Numeric (0 - 10)  Pain Intensity 1: 4  Pain Onset 1: acute  Pain Location 1: Hip  Pain Orientation 1: Right  Pain Description 1: Aching  Pain Intervention(s) 1: Medication (see MAR)  Patient Stated Pain Goal: 0  Pain Reassessment 1: Yes Pain Scale 1: Numeric (0 - 10)  Pain Intensity 1: 0  Pain Onset 1: movement  Pain Location 1: Hip  Pain Orientation 1: Right  Pain Description 1: Aching  Pain Intervention(s) 1: Medication (see MAR), Repositioned, Rest  Patient Stated Pain Goal: 0  Pain Reassessment 1: Patient sleeping   GROSS ASSESSMENT GROSS ASSESSMENT   AROM: Generally decreased, functional  PROM: Generally decreased, functional  Strength: Generally decreased, functional(R hip 2-5, R knee 4+/5; L hip 4-/5, L knee 4+/5)  Coordination: Generally decreased, functional  Tone: Normal  Sensation: Intact(BUEs) AROM: Within functional limits(BUEs)  PROM: Generally decreased, functional  Strength: Generally decreased, functional(BUEs 4-/5)  Coordination: Within functional limits(BUEs)  Tone: Normal(BUEs)  Sensation: Intact(BUEs)   BED MOBILITY BED MOBILITY   Rolling: Moderate assistance, Assist x2  Supine to Sit: Moderate assistance(for RLE management)  Scooting: Minimum assistance Rolling: Moderate assistance, Assist x2  Supine to Sit: Contact guard assistance, Additional time  Scooting: Stand-by assistance, Additional time   GAIT GAIT   Base of Support: Center of gravity altered  Speed/Tamanna: Slow  Step Length: Right shortened, Left shortened(R > L)  Stance: Left decreased  Gait Abnormalities: Antalgic, Decreased step clearance  Ambulation - Level of Assistance: Minimal assistance  Distance (ft): 2 Feet (ft)  Assistive Device: Gait belt, Walker, rolling  Rail Use: Both  Stairs - Level of Assistance: Minimum assistance  Number of Stairs Trained: 3(4 inch)  Interventions: Tactile cues, Safety awareness training, Verbal cues Base of Support: Center of gravity altered  Speed/Tamanna: Slow  Step Length: Right shortened, Left shortened  Stance: Left decreased  Gait Abnormalities: Decreased step clearance  Ambulation - Level of Assistance: Stand-by assistance  Distance (ft): 50 Feet (ft)(+25)  Assistive Device: Gait belt, Walker, rolling  Rail Use: Both  Stairs - Level of Assistance: Contact guard assistance  Number of Stairs Trained: 10(5x2 reps)  Interventions: Safety awareness training, Verbal cues         Toe touch Partial (%)   Full Full   TRANSFERS TRANSFERS   Sit to Stand:  Moderate assistance  Stand to Sit: Moderate assistance  Bed to Chair: Minimum assistance Sit to Stand: Stand-by assistance, Additional time  Stand to Sit: Stand-by assistance  Bed to Chair: Contact guard assistance, Additional time   BALANCE BALANCE   Sitting: With support  Sitting - Static: Fair (occasional)  Sitting - Dynamic: Fair (occasional)  Standing: With support, Impaired  Standing - Static: Fair  Standing - Dynamic : Fair(-) Sitting: With support  Sitting - Static: Good (unsupported)  Sitting - Dynamic: Fair (occasional)(((+)))  Standing: With support  Standing - Static: Fair  Standing - Dynamic : Fair   WHEELCHAIR MOBILITY/MGMT WHEELCHAIR MOBILITY/MGMT         Activity Tolerance:  Poor Activity Tolerance: Fair   Visual/Perceptual   Corrective Lenses: Reading glasses      Visual/Perceptual   Vision  Corrective Lenses: Reading glasses        Auditory:   Auditory Impairment: Hard of hearing, bilateral    Auditory:   Auditory  Auditory Impairment: Hard of hearing, left side       Steps: both   Clinical Decision making:   Clinical Decision making:  [place] standing balance score: 3/5     Treatment:   Pt has made limited progress this week. Noted progress with bed mobility and ambulation as pt does well with PWB when provided with cues. Pt continues to be limited by confusion and intermittent R hip pain. During today's session; pt demonstrated supine>sit with CGA, verbal cues for technique. Pt able to scoot to EOB with SBA and cues. Sit<>stand throughout with CGA/SBA and intermittent verbal cues for hand placement. Gait training rendered with trails x50ft and 25ft with RW and good PWB compliance when provided with verbal cues. Static standing balance with 1 to intermittent no UE support and SBA with TTWB/PWB. Pt limited with standing dynamic balance at this time due to [facility] on R LE. When attempting reaching out of PINA with standing, noted increased WB on R LE.      Patient's response to treatment rendered:  fair (+)     Patient expected Discharge Location:  ? Private Residence  ? assisted/ILF  ? LTC  ? Other:    Plan: Continue Skilled PT services as established by the Plan of Care for 3-6 times a week. PT and Assistant have had a weekly case conference regarding the above treatment:  ? Yes     ? No       Treatment session:  48 minutes. Therapist: Yolanda Martinez PTA       Date:5/27/2019  Forward to PT for co-signature when completed.

## 2019-05-27 NOTE — PROGRESS NOTES
attempted to complete a follow up visit with patient but found him resting peacefully at this time. and a Spiritual assessment of patient could not be done now.  Chaplains will continue to follow and will provide pastoral care on an as needed/requested basis    Chaplain Jass Ho   Board Certified [address] [place] [address]   [phone]

## 2019-05-27 NOTE — PROGRESS NOTES
conducted a Follow up consultation and Spiritual Assessment for Grace Valverde, who is a 80 y. o.,male. Patients Primary Language is: [place]. According to the patients EMR Roman Catholic Affiliation is: Orthodoxy. The  provided the following Interventions:  Continued the relationship of care and support. Patient was easily awoken. He is in good spirits. Listened empathically to patient. He said his wife visited him today. Also, at home he was able to walk unassisted before his fall.  offered encouragement and support. Offered [employer], prayer, and assurance of prayer for patient. The following outcomes were achieved:  Patient expressed gratitude for pastoral care visit. Assessment:  There are no further spiritual or Rastafari issues which require Spiritual Care Services interventions at this time. Plan:  Chaplains will continue to follow and provide pastoral care as needed or requested.  recommends bedside caregivers page  on duty if patient shows signs of acute spiritual or emotional distress. The Rev.  R Sherrill Joya [phone] [facility] [phone] / [facility] [phone]

## 2019-05-28 LAB
APPEARANCE UR: CLEAR
BACTERIA URNS QL MICRO: NEGATIVE /HPF
BILIRUB UR QL: NEGATIVE
COLOR UR: YELLOW
EPITH CASTS URNS QL MICRO: NORMAL /LPF (ref 0–5)
GLUCOSE BLD STRIP.AUTO-MCNC: 130 MG/DL (ref 70–110)
GLUCOSE BLD STRIP.AUTO-MCNC: 133 MG/DL (ref 70–110)
GLUCOSE BLD STRIP.AUTO-MCNC: 150 MG/DL (ref 70–110)
GLUCOSE BLD STRIP.AUTO-MCNC: 200 MG/DL (ref 70–110)
GLUCOSE UR STRIP.AUTO-MCNC: NEGATIVE MG/DL
HGB UR QL STRIP: NEGATIVE
KETONES UR QL STRIP.AUTO: NEGATIVE MG/DL
LEUKOCYTE ESTERASE UR QL STRIP.AUTO: ABNORMAL
NITRITE UR QL STRIP.AUTO: NEGATIVE
PH UR STRIP: 5.5 [PH] (ref 5–8)
PROT UR STRIP-MCNC: NEGATIVE MG/DL
RBC #/AREA URNS HPF: 0 /HPF (ref 0–5)
SP GR UR REFRACTOMETRY: 1.01 (ref 1–1.03)
UROBILINOGEN UR QL STRIP.AUTO: 0.2 EU/DL (ref 0.2–1)
WBC URNS QL MICRO: NORMAL /HPF (ref 0–4)

## 2019-05-28 PROCEDURE — 82962 GLUCOSE BLOOD TEST: CPT

## 2019-05-28 PROCEDURE — 87086 URINE CULTURE/COLONY COUNT: CPT

## 2019-05-28 PROCEDURE — 74011250637 HC RX REV CODE- 250/637: Performed by: NURSE PRACTITIONER

## 2019-05-28 PROCEDURE — 74011250637 HC RX REV CODE- 250/637: Performed by: INTERNAL MEDICINE

## 2019-05-28 PROCEDURE — 81001 URINALYSIS AUTO W/SCOPE: CPT

## 2019-05-28 PROCEDURE — 74011636637 HC RX REV CODE- 636/637: Performed by: INTERNAL MEDICINE

## 2019-05-28 RX ADMIN — LISINOPRIL 10 MG: 10 TABLET ORAL at 09:36

## 2019-05-28 RX ADMIN — DOCUSATE SODIUM 100 MG: 100 CAPSULE, LIQUID FILLED ORAL at 09:36

## 2019-05-28 RX ADMIN — PANTOPRAZOLE SODIUM 40 MG: 40 TABLET, DELAYED RELEASE ORAL at 09:36

## 2019-05-28 RX ADMIN — WARFARIN SODIUM 2 MG: 2 TABLET ORAL at 17:58

## 2019-05-28 RX ADMIN — CHOLECALCIFEROL (VITAMIN D3) 25 MCG (1,000 UNIT) TABLET 1000 UNITS: at 09:00

## 2019-05-28 RX ADMIN — INSULIN LISPRO 4 UNITS: 100 INJECTION, SOLUTION INTRAVENOUS; SUBCUTANEOUS at 12:23

## 2019-05-28 RX ADMIN — Medication 1000 MG: at 09:00

## 2019-05-28 RX ADMIN — METFORMIN HYDROCHLORIDE 1000 MG: 500 TABLET ORAL at 09:36

## 2019-05-28 RX ADMIN — METFORMIN HYDROCHLORIDE 1000 MG: 500 TABLET ORAL at 17:57

## 2019-05-28 RX ADMIN — INSULIN LISPRO 2 UNITS: 100 INJECTION, SOLUTION INTRAVENOUS; SUBCUTANEOUS at 09:39

## 2019-05-28 RX ADMIN — POLYETHYLENE GLYCOL 3350 17 G: 17 POWDER, FOR SOLUTION ORAL at 09:36

## 2019-05-28 RX ADMIN — OXYCODONE HYDROCHLORIDE AND ACETAMINOPHEN 2 TABLET: 10; 325 TABLET ORAL at 06:30

## 2019-05-28 RX ADMIN — SIMVASTATIN 20 MG: 20 TABLET, FILM COATED ORAL at 22:04

## 2019-05-28 NOTE — ROUTINE PROCESS
Bedside and Verbal shift change report given to JOAQUIN Abad LPN (oncoming nurse) by Avel Davey RN (offgoing nurse). Report included the following information SBAR, Kardex and Med Rec Status. QH visual checks and 24h chart check done.

## 2019-05-28 NOTE — PROGRESS NOTES
Nutrition follow-up/  Plan of care TCC      RECOMMENDATIONS:     1. Diabetic Consistent Carb diet  2. Monitor weight, labs and PO intake  3. RD to follow     GOALS:     1. Ongoing: PO intake meets >75% of protein/calorie needs by 6/4  2. Ongoing: Weight Maintenance (+/- 1-2 lb) by 6/4    ASSESSMENT:     Weight status is classified as normal per Body mass index is 20.26 kg/m². PO intake is fair. Labs noted. BG range (139-200) over the past 24 hours; A1c (6.8). Nutrition recommendations listed. RD to follow. Nutrition Risk:  [] High  [] Moderate [x]  Low    SUBJECTIVE/OBJECTIVE:   (5/28): Pt with baseline appetite and doing well. Last BM on (5/26) per I/Os. Weights have been more stable but with some variability. Noted a 4.8 lb or 3.3% loss from admission weight. Will continue to monitor.      (5/21): Variable but fair appetite. Pt seen in room OOB in chair at lunch with wife at bedside. Observed 55% of meal consumed. Last BM on (5/18) per I/Os and noted MOM given early this morning. No new weight on record as of yet. Will continue to monitor. (5/14): Pt has a variable appetite but fair overall. Observed 45% of a very light lunch consumed but had a good breakfast this morning. Last BM (5/14). Weight stable from last week but noted a 7 lb or 4.9% loss since admission weight 2 weeks ago. Continue to encourage intake and will monitor.     (5/7): Current weight on record showing a 6 lb or 4.2% loss since admission x 1 week ago. Noted stable weight 140-145 lb PTA. Last BM (5/5) and on bowel regimen. Pt seen in room later in the day. Stated his appetite/PO intake has been normal, but he has never been a big eater. Per RN Pt has been consuming ~50% of meals. Encouraged intake and will continue to follow. (4/30): Transferred from [address] on 4/29. Admitted s/p fall with right hip fracture. S/P right hip percutaneous pinning on 4/26. Has history of Alzheimer's disease, diabetes, HTN and a-fib.  Patient reports having a good appetite and weight was stable at 145-150 lb PTA. Observed 75% intake of breakfast meal during visit. Denies food allergies and problems with chewing/swallowing. Will monitor. Information Obtained from:    [x] Chart Review   [x] Patient   [] Family/Caregiver   [x] Nurse/Physician   [] Interdisciplinary Meeting/Rounds    Diet: Diabetic Consistent Carb diet  Medications: [x] Reviewed    Allergies: [x] Reviewed   Patient Active Problem List   Diagnosis Code    Hip fracture ([facility] S72.009A    Closed right hip fracture, initial encounter ([facility] S72.001A    [facility] I48.91    HTN (hypertension) I10     Past Medical History:   Diagnosis Date    Alzheimer's disease     Atrial fibrillation ([facility]     Diabetes ([facility]     Hypertension       Labs:    Lab Results   Component Value Date/Time    Sodium 137 05/27/2019 02:00 AM    Potassium 4.6 05/27/2019 02:00 AM    Chloride 104 05/27/2019 02:00 AM    CO2 24 05/27/2019 02:00 AM    Anion gap 9 05/27/2019 02:00 AM    Glucose 137 (H) 05/27/2019 02:00 AM    BUN 18 05/27/2019 02:00 AM    Creatinine 0.89 05/27/2019 02:00 AM    Calcium 8.6 05/27/2019 02:00 AM     Anthropometrics: BMI (calculated): 20.3  Last 3 Recorded Weights in this Encounter    05/14/19 1620 05/21/19 1521 05/28/19 1703   Weight: 61.6 kg (135 lb 12.8 oz) 61.2 kg (135 lb) 62.2 kg (137 lb 3.2 oz)      Ht Readings from Last 1 Encounters:   04/29/19 5' 9\" (1.753 m)     Weight Metrics 5/28/2019 4/26/2019   Weight 137 lb 3.2 oz 140 lb   BMI 20.26 kg/m2 22.6 kg/m2     No data found.     UBW: 145-150 lb  [x] Weight Loss (4.8 lb or 3.3% from admission weight)  [] Weight Gain  [x] Weight Stable PTA per Pt    Estimated Nutrition Needs: [x] MSJ    Calories: 1700 Kcal Based on:   [x] Actual BW    Protein:   70-84 g Based on:   [x] Actual BW    Fluid:       3184-3290 ml Based on:   [x] Actual BW      Nutrition Problems Identified:   [x] Suboptimal PO intake (variable but fair)  [] Food Allergies  [] Difficulty chewing/swallowing/poor dentition  [] Constipation/Diarrhea (Last BM on 5/26; bowel regimen in place)  [] Nausea/Vomiting   [] None  [] Other:     Plan:   [x] Therapeutic Diet  [x]  Obtained/adjusted food preferences/tolerances and/or snacks options   []  Supplements added   [] Occupational therapy following for feeding techniques  []  HS snack added   []  Modify diet texture   []  Modify diet for food allergies   []  Educate patient   []  Assist with menu selection   [x]  Monitor PO intake on meal rounds   [x]  Continue inpatient monitoring and intervention   [x]  Participated in discharge planning/Interdisciplinary rounds/Team meetings   []  Other:     Education Needs:   [] Not appropriate for teaching at this time due to:   [x] Identified and addressed    Nutrition Monitoring and Evaluation:  [x] Continue ongoing monitoring and intervention  [] Other    Jefry Son

## 2019-05-28 NOTE — PROGRESS NOTES
I have reviewed this patient's current medication list and recent laboratory results. At this time, I do not suggest any drug therapy adjustments or additional laboratory monitoring. Thank you,  Saul GUADARRAMA  Ph. M. S.  5/28/2019

## 2019-05-28 NOTE — PROGRESS NOTES
[facility]    Daily progress Note    Patient: Ivette Abarca MRN: [id number]  CSN: [id number]    YOB: 1931  Age: 80 y.o. Sex: male    DOA: 4/29/2019 LOS:  LOS: 29 days                    Subjective:     CC: follow up inr    Ms. Ernesto Gardner is a 80 yr old female who was recently hospitalized 4/26-4/29. Patient came in ER post mechanical fall at home. XRAY showed  Impacted subcapital right femoral neck fracture with moderately severe right hip joint osteoarthritis. Ortho saw patient and had right hip percutaneous pinning on 4/26 and tolerated. Patient had episode of bradycardia which resolved. Patient coumadin restarted with PT/INR monitoring. Patient improved and was sent to [facility] for rehab. I examined him today, he is sitting up in w/c, NAD. He is alert and verbal, surgical healed, no issue. His wife and son concern about patient being delirious at times, no examination today, he answered  Questions appropriately, he had some difficulty remembering name of hospital he is in. He is not having fever or chills, he denies any respiratory or urinary issue.      PAST MEDICAL HX: Alzheimer's Disease, HTN AFIB, DM type 2      PAST SURGICAL Hx: hernia repair     SOCIAL Hx:      ALLERGIES: NKDA     ROSCONST: Fever, weight loss, fatigue or chills  HEENT: Recent changes in vision, vertigo  CV: Chest pain, palpitations, edema and varicosities  RESP: Cough, shortness of breath, wheezing  GI: Nausea, vomiting, abdominal pain, change in bowel habits,  : Hematuria, dysuria, frequency  MS: Weakness, right hip sorness  LYMPH/HEME: Anemia, bruising and history of blood transfusions  INTEG: Dermatitis, abnormal moles  NEURO: Dizziness, headache, fainting, seizures and stroke  PSYCH: Anxiety and depression        Objective:      Visit Vitals  /57   Pulse 63   Temp 97.8 °F (36.6 °C)   Resp 18   Ht 5' 9\" (1.753 m)   Wt 61.2 kg (135 lb)   SpO2 95%   BMI 19.94 kg/m²       Physical Exam:  General appearance: alert, cooperative, no distress, appears stated age  [de-identified]: negative  Neck: supple, symmetrical, trachea midline, no adenopathy, thyroid: not enlarged, symmetric, no tenderness/mass/nodules, no carotid bruit and no JVD  Lungs: clear to auscultation bilaterally  Heart: regular rate and rhythm, S1, S2 normal, no murmur, click, rub or gallop  Abdomen: soft, non-tender. Bowel sounds normal. No masses,  no organomegaly  Pulses: 2+ and symmetric  Skin: Skin color, texture, turgor normal. No rashes or lesions  Right hip healed    Intake and Output:  Current Shift:  No intake/output data recorded. Last three shifts:  No intake/output data recorded.     Recent Results (from the past 24 hour(s))   GLUCOSE, POC    Collection Time: 05/27/19  4:16 PM   Result Value Ref Range    Glucose (POC) 163 (H) 70 - 110 mg/dL   GLUCOSE, POC    Collection Time: 05/27/19  9:03 PM   Result Value Ref Range    Glucose (POC) 139 (H) 70 - 110 mg/dL   GLUCOSE, POC    Collection Time: 05/28/19  4:53 AM   Result Value Ref Range    Glucose (POC) 150 (H) 70 - 110 mg/dL   GLUCOSE, POC    Collection Time: 05/28/19 12:19 PM   Result Value Ref Range    Glucose (POC) 200 (H) 70 - 110 mg/dL         Current Facility-Administered Medications:     docusate sodium (COLACE) capsule 100 mg, 100 mg, Oral, DAILY, Nelly aHnsen NP, 100 mg at 05/28/19 0936    polyethylene glycol (MIRALAX) packet 17 g, 17 g, Oral, DAILY, Bela BOLDEN NP, 17 g at 05/28/19 0936    [facility] ANESTHESIA, , Other, PRN, Octavio Bishop MD    [facility] EMERGENCY/STAT BOX, , Other, PRN, Octavio Bishop MD    oxyCODONE-acetaminophen (PERCOCET) 5-325 mg per tablet 1 Tab, 1 Tab, Oral, Q4H PRN, Octavio Bishop MD, 1 Tab at 05/19/19 0445    oxyCODONE-acetaminophen (PERCOCET 10)  mg per tablet 2 Tab, 2 Tab, Oral, Q4H PRN, Octavio Bishop MD, 2 Tab at 05/28/19 0630    warfarin (COUMADIN) tablet 2 mg, 2 mg, Oral, QPM, Octavio Bishop MD, 2 mg at 05/27/19 1715    cholecalciferol (VITAMIN D3) tablet 1,000 Units, 1,000 Units, Oral, DAILY, Gaby Abrams MD, 1,000 Units at 05/28/19 0900    lisinopril (PRINIVIL, ZESTRIL) tablet 10 mg, 10 mg, Oral, DAILY, Gaby Abrams MD, 10 mg at 05/28/19 0936    metFORMIN (GLUCOPHAGE) tablet 1,000 mg, 1,000 mg, Oral, BID WITH MEALS, Gaby Abrams MD, 1,000 mg at 05/28/19 0936    pantoprazole (PROTONIX) tablet 40 mg, 40 mg, Oral, ACB, Gaby Abrams MD, 40 mg at 05/28/19 0936    insulin lispro (HUMALOG) injection, , SubCUTAneous, AC&HS, Gaby Abrams MD, 4 Units at 05/28/19 1223    flaxseed oil cap 1,000 mg (Patient Supplied), 1,000 mg, Oral, DAILY, Gaby Abrams MD, 1,000 mg at 05/28/19 0900    WARFARIN INFORMATION NOTE (COUMADIN), , Other, PRN, Gaby Abrams MD    simvastatin (ZOCOR) tablet 20 mg, 20 mg, Oral, QHS, Latosha Xiao MD, 20 mg at 05/27/19 2131    magnesium hydroxide (MILK OF MAGNESIA) 400 mg/5 mL oral suspension 30 mL, 30 mL, Oral, DAILY PRN, Latosha Xiao MD, 30 mL at 05/21/19 0300    bisacodyl (DULCOLAX) suppository 10 mg, 10 mg, Rectal, DAILY PRN, Gaby Abrams MD, 10 mg at 05/14/19 0240    Lab Results   Component Value Date/Time    Glucose 137 (H) 05/27/2019 02:00 AM    Glucose 138 (H) 05/20/2019 04:10 AM    Glucose 131 (H) 05/13/2019 04:20 AM    Glucose 116 (H) 05/06/2019 05:05 AM    Glucose 212 (H) 04/29/2019 06:55 PM        Assessment/Plan   Closed right hip fracture: s/p right hip percutaneous pinning on 4/26. Orthopedic surgery in 1 month     Constipated: continue colace + Miralax daily, and prn dulcolax suppository      AFIB: continue Coumadin with PT/INR monitoring  Monday and Thursday    DM type 2 , hgab1c 6.8 on 4/26, will continue metformin 1000 mg bid      HTN; BP stable on Lisinopril      Hyperlipidemia: continue Zocor      Continue PT/OT       ? Delirium probable from hospital and rehab stay, will monitor.  No fever, chill, no white count  Bebeto Kennedy, NP  5/28/2019, 1:52 PM

## 2019-05-28 NOTE — PROGRESS NOTES
[facility]   PHYSICAL THERAPY DAILY TREATMENT NOTE      Patient: Rose Shelley (51 y.o. male)               Date: 5/28/2019    Physician: Bola Alvarado MD  Primary Diagnosis: rt hip fracture          Treatment Diagnosis  Treatment Diagnosis: need for assist with self care  Treatment Diagnosis 2: muscle weakness  Precautions: PWB(50% thru RLE)  Vital Signs  Vital Signs  Temp: 97.8 °F (36.6 °C)  Temp Source: Oral  Pulse (Heart Rate): 63  Cardiac Rhythm: Atrial fibrillation  Resp Rate: 18  BP: 107/57  MAP (Calculated): 74  Cognitive Status:  Mental Status  Neurologic State: Alert  Orientation Level: Oriented to person;Oriented to place  Cognition: Decreased attention/concentration; Impaired decision making; Impulsive;Poor safety awareness  Pain  Pain Screen  Pain Scale 1: Visual  Pain Intensity 1: 0  Patient Stated Pain Goal: 0  Pain Reassessment 1: Patient sleeping  Bed Mobility Training  Bed Mobility Training  Supine to Sit: Stand-by assistance  Scooting: Stand-by assistance  Interventions: Safety awareness training  Balance  Sitting: With support  Sitting - Static: Good (unsupported)  Sitting - Dynamic: Fair (occasional)(((+)))  Standing: With support  Standing - Static: Fair  Standing - Dynamic : Fair  Transfer Training  Transfer Training  Sit to Stand: Stand-by assistance; Additional time  Stand to Sit: Stand-by assistance  Sit to Stand: Stand-by assistance; Additional time  Gait Training  Gait  Base of Support: Center of gravity altered  Step Length: Right shortened;Left shortened  Gait Abnormalities: Decreased step clearance  Ambulation - Level of Assistance: Stand-by assistance  Distance (ft): 240 Feet (ft)  Assistive Device: Gait belt;Walker, rolling  Rail Use: Both  Stairs - Level of Assistance: Contact guard assistance;Stand-by assistance  Number of Stairs Trained: 5  Interventions: Safety awareness training  Gait Abnormalities: Decreased step clearance  Right Side Weight Bearing: Partial (%)    With 3 turns. Treatment: Supine>sit with SBA and use of bed rail. Pt demonstrated unsupported sitting at EOB with fair ((+)) balance for ~5'. Sit<>stand throughout session with SBA with additional time and effort for sit>stand. Pt stood with 1 UE support at [facility] for ~2' with good PWB compliance and no noted LOB. Gait training rendered with abovementioned details and intermittent cues for PWB. Pt with good carryover noted following cues. Pt negotiated 5 steps with B HR and CGA/SBA, pt did not require verbal cues for sequencing today. Seated B LE TE's rendered to promote strength and improve overall functional capacity: HR/TR, LAQ, hip flexion, ball squeezes, resisted hip abd (green band) 2x15. Pt required occasional verbal cues for technique with TE's to maximize functional gains. Pt ambulated an additional 235ft with RW and SBA with less frequent cues required for PWB on R LE. Pt sitting in w/c at bedside, call bell at side, at end of session. Patient/Caregiver Education:   Pt /Caregiver Education on safety and fall prevention. Pt required intermittent cues for PWB with ambulation, but noted improved carryover, especially during stair training, see note above. ASSESSMENT:  Patient continues to benefit from Skilled PT services to improve transfers, quality and safety with ambulation and stair negotiation, dynamic standing balance. Progression toward goals:  ?      Improving appropriately and progressing toward goals  ? Improving slowly and progressing toward goals  ? Not making progress toward goals and plan of care will be adjusted     Treatment session: 46 minutes.   Therapist:   Kwaku Villatoro PTA,          5/28/2019

## 2019-05-29 LAB
GLUCOSE BLD STRIP.AUTO-MCNC: 118 MG/DL (ref 70–110)
GLUCOSE BLD STRIP.AUTO-MCNC: 134 MG/DL (ref 70–110)
GLUCOSE BLD STRIP.AUTO-MCNC: 162 MG/DL (ref 70–110)
GLUCOSE BLD STRIP.AUTO-MCNC: 191 MG/DL (ref 70–110)

## 2019-05-29 PROCEDURE — 74011636637 HC RX REV CODE- 636/637: Performed by: INTERNAL MEDICINE

## 2019-05-29 PROCEDURE — 74011250637 HC RX REV CODE- 250/637: Performed by: NURSE PRACTITIONER

## 2019-05-29 PROCEDURE — 82962 GLUCOSE BLOOD TEST: CPT

## 2019-05-29 PROCEDURE — 74011250637 HC RX REV CODE- 250/637: Performed by: INTERNAL MEDICINE

## 2019-05-29 RX ORDER — OXYCODONE AND ACETAMINOPHEN 5; 325 MG/1; MG/1
1 TABLET ORAL
Status: DISCONTINUED | OUTPATIENT
Start: 2019-05-29 | End: 2019-06-08 | Stop reason: HOSPADM

## 2019-05-29 RX ADMIN — METFORMIN HYDROCHLORIDE 1000 MG: 500 TABLET ORAL at 09:28

## 2019-05-29 RX ADMIN — DOCUSATE SODIUM 100 MG: 100 CAPSULE, LIQUID FILLED ORAL at 09:28

## 2019-05-29 RX ADMIN — INSULIN LISPRO 2 UNITS: 100 INJECTION, SOLUTION INTRAVENOUS; SUBCUTANEOUS at 21:05

## 2019-05-29 RX ADMIN — OXYCODONE HYDROCHLORIDE AND ACETAMINOPHEN 1 TABLET: 5; 325 TABLET ORAL at 21:04

## 2019-05-29 RX ADMIN — INSULIN LISPRO 2 UNITS: 100 INJECTION, SOLUTION INTRAVENOUS; SUBCUTANEOUS at 12:59

## 2019-05-29 RX ADMIN — SIMVASTATIN 20 MG: 20 TABLET, FILM COATED ORAL at 21:04

## 2019-05-29 RX ADMIN — METFORMIN HYDROCHLORIDE 1000 MG: 500 TABLET ORAL at 17:00

## 2019-05-29 RX ADMIN — LISINOPRIL 10 MG: 10 TABLET ORAL at 09:28

## 2019-05-29 RX ADMIN — WARFARIN SODIUM 2 MG: 2 TABLET ORAL at 17:15

## 2019-05-29 RX ADMIN — OXYCODONE HYDROCHLORIDE AND ACETAMINOPHEN 2 TABLET: 10; 325 TABLET ORAL at 09:39

## 2019-05-29 RX ADMIN — PANTOPRAZOLE SODIUM 40 MG: 40 TABLET, DELAYED RELEASE ORAL at 09:28

## 2019-05-29 RX ADMIN — POLYETHYLENE GLYCOL 3350 17 G: 17 POWDER, FOR SOLUTION ORAL at 09:28

## 2019-05-29 RX ADMIN — CHOLECALCIFEROL (VITAMIN D3) 25 MCG (1,000 UNIT) TABLET 1000 UNITS: at 09:28

## 2019-05-29 NOTE — PROGRESS NOTES
Problem: Mobility Impaired (Adult and Pediatric)  Goal: *Acute Goals and Plan of Care (Insert Text)  Description  PHYSICAL THERAPY LTG GOALS :  Initiated 4/30/2019 and to be accomplished within 4 Weeks (updated 5/27/19)    1. Patient will move from supine to sit and sit to supine  and roll side to side in bed with modified independence.   (progressing; SBA)  2.  Patient will transfer from bed to chair and chair to bed with supervision/set-up using RW with WB compliance. (Maintaining at SBA)  3. Patient will perform sit to stand with supervision/set-up with Good balance and safety awareness. (Maintaining at SBA)  4. Patient will ambulate with supervision/set-up for 200 feet with RW on level surfaces and be able to maneuver through narrow spaces and obstacles without loss of balance with WB compliance. (Maintaining at SBA)  5. Patient will ascend/descend 3 stairs with NO handrail(s) with stand by assistance to allow for safe home access/exit. (Maintaining at; 3 stairs with BHR with CGA)  6. Patient will improve standardized test score for [place] Standing Balance Scale 4/5 to reduce fall risk. (Maintaining at; 3/5)     PHYSICAL THERAPY LTG GOALS :  Initiated 4/30/2019 and to be accomplished within 4 Weeks (updated 5/21/19)    1. Patient will move from supine to sit and sit to supine  and roll side to side in bed with modified independence.   (progressing; CGA/min A for RLE management)  2. Patient will transfer from bed to chair and chair to bed with supervision/set-up using RW with WB compliance. (Progressing; SBA)  3. Patient will perform sit to stand with supervision/set-up with Good balance and safety awareness. (Progresing; SBA)  4. Patient will ambulate with supervision/set-up for 200 feet with RW on level surfaces and be able to maneuver through narrow spaces and obstacles without loss of balance with WB compliance. (Progressing; SBA)  5.   Patient will ascend/descend 3 stairs with NO handrail(s) with stand by assistance to allow for safe home access/exit. (Progresing; 3 stairs with BHR with CGA)  6. Patient will improve standardized test score for [place] Standing Balance Scale 4/5 to reduce fall risk. (Progresing; 3/5)     PHYSICAL THERAPY STG GOALS :  Initiated 4/30/2019 and to be accomplished within 2 Weeks (Updated 5/21/19)    1. Patient will move from supine to sit and sit to supine  and roll side to side in bed with stand by assistance. (Progressing; CGA/Min A for RLE management)    2. Patient will transfer from bed to chair and chair to bed with contact guard assist using RW with WB compliance. (Achieved)  3. Patient will perform sit to stand with contact guard assist with Fair+ balance and safety awareness with WB compliance. (Achieved)   4. Patient will ambulate with contact guard assist for 75 feet with RW on level surfaces with 2 turns with WB compliance. (Achieved)   5. Patient will ascend/descend 1 step with bilateral handrail(s) with minimal assistance to allow for safe home access/exit with WB compliance. (Achieved with cueing for TTWB compliance, inside // bars)  6. Patient will improve standardized test score for [place] Standing Balance Scale 3/5 to reduce fall risk. (Achieved)    PHYSICAL THERAPY STG GOALS :  Initiated 4/30/2019 and to be accomplished within 2 Weeks (Updated 5/14/19)    1. Patient will move from supine to sit and sit to supine  and roll side to side in bed with stand by assistance. (Progressing; Min A for RLE management)    2. Patient will transfer from bed to chair and chair to bed with contact guard assist using RW with WB compliance. (Achieved)  3. Patient will perform sit to stand with contact guard assist with Fair+ balance and safety awareness with WB compliance. (Achieved)   4. Patient will ambulate with contact guard assist for 75 feet with RW on level surfaces with 2 turns with WB compliance. (Achieved)   5.   Patient will ascend/descend 1 step with bilateral handrail(s) with minimal assistance to allow for safe home access/exit with WB compliance. (Achieved with cueing for TTWB compliance, inside // bars)  6. Patient will improve standardized test score for [place] Standing Balance Scale 3/5 to reduce fall risk. (Maintained; 2+/5)    PHYSICAL THERAPY STG GOALS :  Initiated 4/30/2019 and to be accomplished within 2 Weeks (Updated 5/7/19)    1. Patient will move from supine to sit and sit to supine  and roll side to side in bed with stand by assistance. (Progressing; Min-Mod A)    2. Patient will transfer from bed to chair and chair to bed with contact guard assist using RW with WB compliance. (Achieved)  3. Patient will perform sit to stand with contact guard assist with Fair+ balance and safety awareness with WB compliance. (Progressing; CGA-Min A)   4. Patient will ambulate with contact guard assist for 75 feet with RW on level surfaces with 2 turns with WB compliance. (Progressing; 100ft with RW and CGA, cues for TTWB and no 1 turn)   5. Patient will ascend/descend 1 step with bilateral handrail(s) with minimal assistance to allow for safe home access/exit with WB compliance. (Not assessed due to increased difficulty with TTWB during ambulation)  6. Patient will improve standardized test score for [place] Standing Balance Scale 3/5 to reduce fall risk. (Progressing; 2+/5)    PHYSICAL THERAPY STG GOALS :  Initiated 4/30/2019 and to be accomplished within 2 Weeks    1. Patient will move from supine to sit and sit to supine  and roll side to side in bed with stand by assistance. 2.  Patient will transfer from bed to chair and chair to bed with contact guard assist using RW with WB compliance. 3.  Patient will perform sit to stand with contact guard assist with Fair+ balance and safety awareness with WB compliance.   4.  Patient will ambulate with contact guard assist for 75 feet with RW on level surfaces with 2 turns with WB compliance. 5.  Patient will ascend/descend 1 step with bilateral handrail(s) with minimal assistance to allow for safe home access/exit with WB compliance. 6.  Patient will improve standardized test score for [place] Standing Balance Scale 3/5 to reduce fall risk. PHYSICAL THERAPY LTG GOALS :  Initiated 4/30/2019 and to be accomplished within 4 Weeks    1. Patient will move from supine to sit and sit to supine  and roll side to side in bed with modified independence. 2.  Patient will transfer from bed to chair and chair to bed with supervision/set-up using RW with WB compliance. 3.  Patient will perform sit to stand with supervision/set-up with Good balance and safety awareness. 4.  Patient will ambulate with supervision/set-up for 200 feet with RW on level surfaces and be able to maneuver through narrow spaces and obstacles without loss of balance with WB compliance. 5.  Patient will ascend/descend 3 stairs with NO handrail(s) with stand by assistance to allow for safe home access/exit. 6.  Patient will improve standardized test score for [place] Standing Balance Scale 4/5 to reduce fall risk.       Physical Therapist:   Rm Adan PT  on 4/30/2019               [facility]   PHYSICAL THERAPY DAILY TREATMENT NOTE      Patient: Sj Gonzalez (58 y.o. male)               Date: 5/29/2019    Physician: Krupa Gomez MD  Primary Diagnosis: rt hip fracture          Treatment Diagnosis  Treatment Diagnosis: need for assist with self care  Treatment Diagnosis 2: muscle weakness  Precautions: PWB(50% thru RLE)  Vital Signs  Vital Signs  Temp: 96.8 °F (36 °C)  Temp Source: Oral  Pulse (Heart Rate): 63  Cardiac Rhythm: Atrial fibrillation  Resp Rate: 19  O2 Sat (%): 97 %  BP: 113/59  MAP (Calculated): 77     Cognitive Status:  Mental Status  Neurologic State: Confused  Orientation Level: Oriented to place  Cognition: Impulsive;Poor safety awareness  Pain  Pain Screen  Pain Scale 1: Visual  Pain Intensity 1: 0  Patient Stated Pain Goal: 0  Pain Reassessment 1: Patient sleeping  Bed Mobility Training  Bed Mobility Training  Rolling: Modified independent  Supine to Sit: Modified independent  Scooting: Modified independent  Balance  Sitting: With support  Sitting - Static: Good (unsupported)  Sitting - Dynamic: Fair (occasional)  Standing: Impaired  Standing - Static: Good  Standing - Dynamic : Good(with support)  Transfer Training  Transfer Training  Sit to Stand: Supervision  Stand to Sit: Supervision  Sit to Stand: Supervision  Gait Training  Gait  Base of Support: Center of gravity altered  Speed/Tamanna: Pace decreased (<100 feet/min)  Step Length: Left shortened;Right shortened  Gait Abnormalities: Decreased step clearance  Ambulation - Level of Assistance: Supervision  Distance (ft): 280 Feet (ft)  Assistive Device: Walker, rolling  Rail Use: Both  Stairs - Level of Assistance: Stand-by assistance  Number of Stairs Trained: 5     Gait Abnormalities: Decreased step clearance  Right Side Weight Bearing: Partial (%)(50)       Patient/Caregiver Education:   Pt /Caregiver Education on safety and fall prevention  and stairs,  was provided to  pt, spouse and family friend. ASSESSMENT:  Pt continues to progress towards goals, requires frequent cues for walker management and compliance with weight bearing restriction. Pt instructed in seated and standing exercises for strengthening to improve balance and functional mobility. Instructed pt on 5 stairs, with verbal and tactile cues for sequencing, Pt and family educated on proper navigation of stairs. Progression toward goals:  ?      Improving appropriately and progressing toward goals  ? Improving slowly and progressing toward goals  ? Not making progress toward goals and plan of care will be adjusted     Treatment session: 49 minutes.   Therapist:   Michael Sage PTA,          5/29/2019

## 2019-05-29 NOTE — PROGRESS NOTES
I called Mr. Miguel A Alvarez and I have discussed current medical condition and all questions were answered.

## 2019-05-29 NOTE — ROUTINE PROCESS
Bedside and Verbal shift change report given to [facility] [id number] (oncoming nurse) by Kaley Diego RN (offgoing nurse). Report included the following information SBAR, Kardex and MAR.

## 2019-05-29 NOTE — PROGRESS NOTES
Problem: Self Care Deficits Care Plan (Adult)  Goal: *Acute Goals and Plan of Care (Insert Text)  Description  -CLOF: OCCUPATIONAL THERAPY SHORT TERM GOALS        Updated 5/13/19    1. Patient will perform Upper body ADL's without adaptive equipment with modified independence. 2.  Patient will perform Lower body ADL's with adaptive equipment with moderate assistance. 3.  Patient will perform toileting task with moderate assistance  with Good safety to reduce falls risk. 4.  Patient will perform bedside commode transfers with Cookie Dotson and contact guard assistance while adhering to RLE TTWB. 5.  Patient will perform standing static/dynamic balance activities for improved ADL function with minimal assistance and Fair+ balance and safety awareness while adhering to RLE TTWB. 6.  Patient will improve Barthel index scores to atleast 00357 to improve functional mobility. 7.  Patient will utilize energy conservation techniques during functional activities with minimal verbal cues. Updated 5/6/19    1. Patient will perform Upper body ADL's without adaptive equipment with modified independence. -CLOF: bathing w/ SBA, dressing w/ Min A  2. Patient will perform Lower body ADL's with adaptive equipment with moderate assistance. -CLOF: bathing w/ Mod A , dressing w/ Max A using AE: reacher  3. Patient will perform toileting task with moderate assistance  with Good safety to reduce falls risk. -CLOF: Mod I bladder mgmt using urinal, Dep clothing mgmt and bowel mgmt  4. Patient will perform bedside commode transfers with Rolling Walker and minimal assistance while adhering to RLE TTWB. - CLOF: Min A w/ RW adhering to R LE TTWB, upgrade  5. Patient will perform standing static/dynamic balance activities for improved ADL function with minimal assistance and Fair+ balance and safety awareness while adhering to RLE TTWB.-CLOF: Fair- static, Fair- dynamic w/ RW and CGA - Min A  6.   Patient will improve Barthel index scores to atleast [id number] to improve functional mobility. - CLOF: /100  7. Patient will utilize energy conservation techniques during functional activities with minimal verbal cues. - CLOF: Max verbal cues     Initiated 4/30/2019 and to be accomplished within 2 Week(s)    1. Patient will perform Upper body ADL's without adaptive equipment with modified independence. -CLOF: bathing and dressing w/ set up  2. Patient will perform Lower body ADL's with adaptive equipment with moderate assistance. -CLOF: bathing and dressing w/ Max A  3. Patient will perform toileting task with moderate assistance  with Good safety to reduce falls risk. -CLOF: Mod I w/ bladder mgmt using urinal. Dep bowel mgmt, Max A clothing mgmt  4. Patient will perform bedside commode transfers with Rolling Walker and minimal assistance while adhering to RLE TTWB.-CLOF: CGA w/ RW while adhering to RLE TTWB  5. Patient will perform standing static/dynamic balance activities for improved ADL function with minimal assistance and Fair+ balance and safety awareness while adhering to RLE TTWB.-CLOF: Karmen static and Fair dynamic w/ RW & CGA while adhering to RLE TTWB  6. Patient will improve Barthel index scores to atleast 39193 to improve functional mobility. -CLOF: 55/100  7. Patient will utilize energy conservation techniques during functional activities with minimal verbal cues. -CLOF:  Max verbal cues    OCCUPATIONAL THERAPY LONG TERM GOALS   Initiated 4/30/2019 and to be accomplished within 4 Week(s)    1. Patient will perform ADL's with adaptive equipment as needed with modified independence. 2.  Patient will perform toileting task with modified independence with Good safety to reduce falls risk. 3.  Patient will perform all functional transfers utilizing least restrictive device and durable medical equipment as needed with modified independence and Good balance and safety awareness.   4.  Patient will perform standing static/dynamic activity for improved ADL function with modified independence and Good balance and safety awareness. 5.  Patient will improve Barthel index score to 95/100 to improve independence with mobility. 6. Patient will perform UE HEP with Modified [place] to increase BUE strength for continued participation in ADLs. Therapist: Isaac Shaikh    4/30/2019                Outcome: Progressing Towards Goal   [facility]   OCCUPATIONAL THERAPY DAILY TREATMENT NOTE      Patient: Milton English (73 y.o. male)       Date: 5/29/2019  Attending Physician: Leo Nieves MD  Primary Diagnosis: rt hip fracture    Treatment Diagnosis  Treatment Diagnosis: need for assist with self care  Treatment Diagnosis 2: muscle weakness   Precautions : Precautions at Admission: PWB(50% thru RLE)  Vital Signs:  Vital Signs  Temp: 96.8 °F (36 °C)  Temp Source: Oral  Pulse (Heart Rate): 63  Cardiac Rhythm: Atrial fibrillation  Resp Rate: 19  O2 Sat (%): 97 %  BP: 113/59  MAP (Calculated): 77     Cognitive Status:  Mental Status  Neurologic State: Confused  Orientation Level: Oriented to place  Cognition: Impulsive;Poor safety awareness  Pain:  Pain Screen  Pain Scale 1: Visual  Pain Intensity 1: 0  Patient Stated Pain Goal: 0  Pain Reassessment 1: Patient sleeping  Pain Scale 1: Visual    Bed Mobility:  Bed Mobility  Rolling: Modified independent  Supine to Sit: Modified independent  Scooting: Modified independent  Transfers:  Functional Transfers  Sit to Stand: Supervision  Stand to Sit: Supervision  Bathroom mobility: CGA w/RW  Toilet transfer: CGA w/grab bar     Balance:  Balance  Sitting: With support  Sitting - Static: Good (unsupported)  Sitting - Dynamic: Fair (occasional)  Standing: Impaired  Standing - Static: Good  Standing - Dynamic : Good(with support)  ADL Self Care:  Basic ADL  Type of Bath: Basin/Soap/Water  ADL Intervention:  Pt performs bathroom mobility w/RW and toileting ADL w/CGA.  Pt tolerates standing performing hand hygiene and hair care. Therapeutic Exercises:   BUE TherEx w/1# wrist weights and puzzle activity at w/c level     Patient/Caregiver Education:    Roland Vega Education on safety w/RW was provided to  functional mobility. ASSESSMENT:  Patient continues to demonstrate the need for skilled Occupational Therapy services to improve dynamic standing balance w/toileting ADL  Progression toward goals:  ?      Improving appropriately and progressing toward goals  ? Improving slowly and progressing toward goals  ?       Not making progress toward goals and plan of care will be adjusted     Treatment session:   47 minutes    Therapist:    KAIDEN Dubon,  5/29/2019

## 2019-05-29 NOTE — PROGRESS NOTES
Pt slept quietly through out the night, pt used call bell for assistance to the bathroom, no complaints of pain, call bell and personal items within reach

## 2019-05-30 ENCOUNTER — PATIENT OUTREACH (OUTPATIENT)
Dept: CASE MANAGEMENT | Age: 84
End: 2019-05-30

## 2019-05-30 LAB
BACTERIA SPEC CULT: ABNORMAL
BACTERIA SPEC CULT: ABNORMAL
GLUCOSE BLD STRIP.AUTO-MCNC: 125 MG/DL (ref 70–110)
GLUCOSE BLD STRIP.AUTO-MCNC: 149 MG/DL (ref 70–110)
GLUCOSE BLD STRIP.AUTO-MCNC: 161 MG/DL (ref 70–110)
GLUCOSE BLD STRIP.AUTO-MCNC: 219 MG/DL (ref 70–110)
INR PPP: 2 (ref 0.8–1.2)
PROTHROMBIN TIME: 22.7 SEC (ref 11.5–15.2)
SERVICE CMNT-IMP: ABNORMAL

## 2019-05-30 PROCEDURE — 36415 COLL VENOUS BLD VENIPUNCTURE: CPT

## 2019-05-30 PROCEDURE — 74011250637 HC RX REV CODE- 250/637: Performed by: INTERNAL MEDICINE

## 2019-05-30 PROCEDURE — 82962 GLUCOSE BLOOD TEST: CPT

## 2019-05-30 PROCEDURE — 85610 PROTHROMBIN TIME: CPT

## 2019-05-30 PROCEDURE — 74011250637 HC RX REV CODE- 250/637: Performed by: NURSE PRACTITIONER

## 2019-05-30 PROCEDURE — 74011636637 HC RX REV CODE- 636/637: Performed by: INTERNAL MEDICINE

## 2019-05-30 RX ADMIN — Medication 1000 MG: at 09:00

## 2019-05-30 RX ADMIN — METFORMIN HYDROCHLORIDE 1000 MG: 500 TABLET ORAL at 18:39

## 2019-05-30 RX ADMIN — CHOLECALCIFEROL (VITAMIN D3) 25 MCG (1,000 UNIT) TABLET 1000 UNITS: at 09:00

## 2019-05-30 RX ADMIN — SIMVASTATIN 20 MG: 20 TABLET, FILM COATED ORAL at 21:37

## 2019-05-30 RX ADMIN — DOCUSATE SODIUM 100 MG: 100 CAPSULE, LIQUID FILLED ORAL at 10:22

## 2019-05-30 RX ADMIN — POLYETHYLENE GLYCOL 3350 17 G: 17 POWDER, FOR SOLUTION ORAL at 10:21

## 2019-05-30 RX ADMIN — OXYCODONE HYDROCHLORIDE AND ACETAMINOPHEN 1 TABLET: 5; 325 TABLET ORAL at 10:22

## 2019-05-30 RX ADMIN — INSULIN LISPRO 2 UNITS: 100 INJECTION, SOLUTION INTRAVENOUS; SUBCUTANEOUS at 18:38

## 2019-05-30 RX ADMIN — PANTOPRAZOLE SODIUM 40 MG: 40 TABLET, DELAYED RELEASE ORAL at 10:22

## 2019-05-30 RX ADMIN — WARFARIN SODIUM 2 MG: 2 TABLET ORAL at 18:39

## 2019-05-30 RX ADMIN — INSULIN LISPRO 4 UNITS: 100 INJECTION, SOLUTION INTRAVENOUS; SUBCUTANEOUS at 12:18

## 2019-05-30 RX ADMIN — LISINOPRIL 10 MG: 10 TABLET ORAL at 10:22

## 2019-05-30 RX ADMIN — METFORMIN HYDROCHLORIDE 1000 MG: 500 TABLET ORAL at 10:22

## 2019-05-30 RX ADMIN — OXYCODONE HYDROCHLORIDE AND ACETAMINOPHEN 1 TABLET: 5; 325 TABLET ORAL at 22:43

## 2019-05-30 NOTE — PROGRESS NOTES
Problem: Mobility Impaired (Adult and Pediatric)  Goal: *Acute Goals and Plan of Care (Insert Text)  Description  PHYSICAL THERAPY LTG GOALS :  Initiated 4/30/2019 and to be accomplished within 4 Weeks (updated 5/27/19)    1. Patient will move from supine to sit and sit to supine  and roll side to side in bed with modified independence.   (progressing; SBA)  2.  Patient will transfer from bed to chair and chair to bed with supervision/set-up using RW with WB compliance. (Maintaining at SBA)  3. Patient will perform sit to stand with supervision/set-up with Good balance and safety awareness. (Maintaining at SBA)  4. Patient will ambulate with supervision/set-up for 200 feet with RW on level surfaces and be able to maneuver through narrow spaces and obstacles without loss of balance with WB compliance. (Maintaining at SBA)  5. Patient will ascend/descend 3 stairs with NO handrail(s) with stand by assistance to allow for safe home access/exit. (Maintaining at; 3 stairs with BHR with CGA)  6. Patient will improve standardized test score for [place] Standing Balance Scale 4/5 to reduce fall risk. (Maintaining at; 3/5)     PHYSICAL THERAPY LTG GOALS :  Initiated 4/30/2019 and to be accomplished within 4 Weeks (updated 5/21/19)    1. Patient will move from supine to sit and sit to supine  and roll side to side in bed with modified independence.   (progressing; CGA/min A for RLE management)  2. Patient will transfer from bed to chair and chair to bed with supervision/set-up using RW with WB compliance. (Progressing; SBA)  3. Patient will perform sit to stand with supervision/set-up with Good balance and safety awareness. (Progresing; SBA)  4. Patient will ambulate with supervision/set-up for 200 feet with RW on level surfaces and be able to maneuver through narrow spaces and obstacles without loss of balance with WB compliance. (Progressing; SBA)  5.   Patient will ascend/descend 3 stairs with NO handrail(s) with stand by assistance to allow for safe home access/exit. (Progresing; 3 stairs with BHR with CGA)  6. Patient will improve standardized test score for [place] Standing Balance Scale 4/5 to reduce fall risk. (Progresing; 3/5)     PHYSICAL THERAPY STG GOALS :  Initiated 4/30/2019 and to be accomplished within 2 Weeks (Updated 5/21/19)    1. Patient will move from supine to sit and sit to supine  and roll side to side in bed with stand by assistance. (Progressing; CGA/Min A for RLE management)    2. Patient will transfer from bed to chair and chair to bed with contact guard assist using RW with WB compliance. (Achieved)  3. Patient will perform sit to stand with contact guard assist with Fair+ balance and safety awareness with WB compliance. (Achieved)   4. Patient will ambulate with contact guard assist for 75 feet with RW on level surfaces with 2 turns with WB compliance. (Achieved)   5. Patient will ascend/descend 1 step with bilateral handrail(s) with minimal assistance to allow for safe home access/exit with WB compliance. (Achieved with cueing for TTWB compliance, inside // bars)  6. Patient will improve standardized test score for [place] Standing Balance Scale 3/5 to reduce fall risk. (Achieved)    PHYSICAL THERAPY STG GOALS :  Initiated 4/30/2019 and to be accomplished within 2 Weeks (Updated 5/14/19)    1. Patient will move from supine to sit and sit to supine  and roll side to side in bed with stand by assistance. (Progressing; Min A for RLE management)    2. Patient will transfer from bed to chair and chair to bed with contact guard assist using RW with WB compliance. (Achieved)  3. Patient will perform sit to stand with contact guard assist with Fair+ balance and safety awareness with WB compliance. (Achieved)   4. Patient will ambulate with contact guard assist for 75 feet with RW on level surfaces with 2 turns with WB compliance. (Achieved)   5.   Patient will ascend/descend 1 step with bilateral handrail(s) with minimal assistance to allow for safe home access/exit with WB compliance. (Achieved with cueing for TTWB compliance, inside // bars)  6. Patient will improve standardized test score for [place] Standing Balance Scale 3/5 to reduce fall risk. (Maintained; 2+/5)    PHYSICAL THERAPY STG GOALS :  Initiated 4/30/2019 and to be accomplished within 2 Weeks (Updated 5/7/19)    1. Patient will move from supine to sit and sit to supine  and roll side to side in bed with stand by assistance. (Progressing; Min-Mod A)    2. Patient will transfer from bed to chair and chair to bed with contact guard assist using RW with WB compliance. (Achieved)  3. Patient will perform sit to stand with contact guard assist with Fair+ balance and safety awareness with WB compliance. (Progressing; CGA-Min A)   4. Patient will ambulate with contact guard assist for 75 feet with RW on level surfaces with 2 turns with WB compliance. (Progressing; 100ft with RW and CGA, cues for TTWB and no 1 turn)   5. Patient will ascend/descend 1 step with bilateral handrail(s) with minimal assistance to allow for safe home access/exit with WB compliance. (Not assessed due to increased difficulty with TTWB during ambulation)  6. Patient will improve standardized test score for [place] Standing Balance Scale 3/5 to reduce fall risk. (Progressing; 2+/5)    PHYSICAL THERAPY STG GOALS :  Initiated 4/30/2019 and to be accomplished within 2 Weeks    1. Patient will move from supine to sit and sit to supine  and roll side to side in bed with stand by assistance. 2.  Patient will transfer from bed to chair and chair to bed with contact guard assist using RW with WB compliance. 3.  Patient will perform sit to stand with contact guard assist with Fair+ balance and safety awareness with WB compliance.   4.  Patient will ambulate with contact guard assist for 75 feet with RW on level surfaces with 2 turns with WB compliance. 5.  Patient will ascend/descend 1 step with bilateral handrail(s) with minimal assistance to allow for safe home access/exit with WB compliance. 6.  Patient will improve standardized test score for [place] Standing Balance Scale 3/5 to reduce fall risk. PHYSICAL THERAPY LTG GOALS :  Initiated 4/30/2019 and to be accomplished within 4 Weeks    1. Patient will move from supine to sit and sit to supine  and roll side to side in bed with modified independence. 2.  Patient will transfer from bed to chair and chair to bed with supervision/set-up using RW with WB compliance. 3.  Patient will perform sit to stand with supervision/set-up with Good balance and safety awareness. 4.  Patient will ambulate with supervision/set-up for 200 feet with RW on level surfaces and be able to maneuver through narrow spaces and obstacles without loss of balance with WB compliance. 5.  Patient will ascend/descend 3 stairs with NO handrail(s) with stand by assistance to allow for safe home access/exit. 6.  Patient will improve standardized test score for [place] Standing Balance Scale 4/5 to reduce fall risk.       Physical Therapist:   Christelle Peralta PT  on 4/30/2019                 Outcome: Progressing Towards Goal   [facility]   PHYSICAL THERAPY DAILY TREATMENT NOTE      Patient: Reuben Rowe (69 y.o. male)               Date: 5/30/2019    Physician: Aimee Garibay MD  Primary Diagnosis: rt hip fracture          Treatment Diagnosis  Treatment Diagnosis: need for assist with self care  Treatment Diagnosis 2: muscle weakness  Precautions: PWB(50% thru RLE)  Vital Signs  Vital Signs  Temp: 97.1 °F (36.2 °C)  Temp Source: Oral  Pulse (Heart Rate): 63  Cardiac Rhythm: Atrial fibrillation(history)  Resp Rate: 19  O2 Sat (%): 97 %  BP: 108/63  MAP (Calculated): 78     Cognitive Status:  Mental Status  Neurologic State: Confused  Orientation Level: Oriented to person  Cognition: Impulsive;Poor safety awareness  Pain  Pain Screen  Pain Scale 1: Visual  Pain Intensity 1: 0  Patient Stated Pain Goal: 0  Bed Mobility Training  Bed Mobility Training  Rolling: Modified independent  Supine to Sit: Modified independent  Balance  Sitting: Intact  Standing: With support  Standing - Static: Good  Standing - Dynamic : Good  Transfer Training  Transfer Training  Sit to Stand: Modified independent  Stand to Sit: Modified independent  Sit to Stand: Modified independent  Gait Training  Gait  Base of Support: Center of gravity altered  Speed/Tamanna: Pace decreased (<100 feet/min)  Step Length: Right shortened;Left shortened  Gait Abnormalities: Decreased step clearance  Ambulation - Level of Assistance: Supervision  Distance (ft): 365 Feet (ft)  Assistive Device: Walker, rolling  Rail Use: Both  Stairs - Level of Assistance: Supervision  Number of Stairs Trained: 5     Gait Abnormalities: Decreased step clearance            Patient/Caregiver Education:   Pt /Caregiver Education on safety and fall prevention,  was provided to  patient. ASSESSMENT:  Pt continues to progress towards goals today, mod I for sit<>stand transfers from [facility]. Pt requires occasional cues for compliance with weight bearing restriction and occasionally takes a few steps without AD, required cues and education on safe use of RW and importance of compliance with restrictions. Pt demonstrated improved navigation on stairs today, verbalized proper sequencing, requiring cues in single instance for proper execution. Instructed pt in LE exercises in seated and standing with R LE to improve strength, ROM and balance for improved functional mobility. Exercises: seated ankle pumps, LAQ, hip add with towel, marching 2 sets of 20 each; standing hip flex, abd and ext on R LE only 2 sets of 20 each. Progression toward goals:  ?      Improving appropriately and progressing toward goals  ?       Improving slowly and progressing toward goals  ?      Not making progress toward goals and plan of care will be adjusted     Treatment session: 47 minutes.   Therapist:   Emily Colunga PTA,          5/30/2019

## 2019-05-30 NOTE — PROGRESS NOTES
Problem: Self Care Deficits Care Plan (Adult)  Goal: *Acute Goals and Plan of Care (Insert Text)  Description  -CLOF: OCCUPATIONAL THERAPY SHORT TERM GOALS        Updated 5/13/19    1. Patient will perform Upper body ADL's without adaptive equipment with modified independence. 2.  Patient will perform Lower body ADL's with adaptive equipment with moderate assistance. 3.  Patient will perform toileting task with moderate assistance  with Good safety to reduce falls risk. 4.  Patient will perform bedside commode transfers with Jerzy Litter and contact guard assistance while adhering to RLE TTWB. 5.  Patient will perform standing static/dynamic balance activities for improved ADL function with minimal assistance and Fair+ balance and safety awareness while adhering to RLE TTWB. 6.  Patient will improve Barthel index scores to atleast [id number] to improve functional mobility. 7.  Patient will utilize energy conservation techniques during functional activities with minimal verbal cues. Updated 5/6/19    1. Patient will perform Upper body ADL's without adaptive equipment with modified independence. -CLOF: bathing w/ SBA, dressing w/ Min A  2. Patient will perform Lower body ADL's with adaptive equipment with moderate assistance. -CLOF: bathing w/ Mod A , dressing w/ Max A using AE: reacher  3. Patient will perform toileting task with moderate assistance  with Good safety to reduce falls risk. -CLOF: Mod I bladder mgmt using urinal, Dep clothing mgmt and bowel mgmt  4. Patient will perform bedside commode transfers with Rolling Walker and minimal assistance while adhering to RLE TTWB. - CLOF: Min A w/ RW adhering to R LE TTWB, upgrade  5. Patient will perform standing static/dynamic balance activities for improved ADL function with minimal assistance and Fair+ balance and safety awareness while adhering to RLE TTWB.-CLOF: Fair- static, Fair- dynamic w/ RW and CGA - Min A  6.   Patient will improve Barthel index scores to atleast [id number] to improve functional mobility. - CLOF: /100  7. Patient will utilize energy conservation techniques during functional activities with minimal verbal cues. - CLOF: Max verbal cues     Initiated 4/30/2019 and to be accomplished within 2 Week(s)    1. Patient will perform Upper body ADL's without adaptive equipment with modified independence. -CLOF: bathing and dressing w/ set up  2. Patient will perform Lower body ADL's with adaptive equipment with moderate assistance. -CLOF: bathing and dressing w/ Max A  3. Patient will perform toileting task with moderate assistance  with Good safety to reduce falls risk. -CLOF: Mod I w/ bladder mgmt using urinal. Dep bowel mgmt, Max A clothing mgmt  4. Patient will perform bedside commode transfers with Rolling Walker and minimal assistance while adhering to RLE TTWB.-CLOF: CGA w/ RW while adhering to RLE TTWB  5. Patient will perform standing static/dynamic balance activities for improved ADL function with minimal assistance and Fair+ balance and safety awareness while adhering to RLE TTWB.-CLOF: Karmen static and Fair dynamic w/ RW & CGA while adhering to RLE TTWB  6. Patient will improve Barthel index scores to atleast [id number] to improve functional mobility. -CLOF: 55/100  7. Patient will utilize energy conservation techniques during functional activities with minimal verbal cues. -CLOF:  Max verbal cues    OCCUPATIONAL THERAPY LONG TERM GOALS   Initiated 4/30/2019 and to be accomplished within 4 Week(s)    1. Patient will perform ADL's with adaptive equipment as needed with modified independence. 2.  Patient will perform toileting task with modified independence with Good safety to reduce falls risk. 3.  Patient will perform all functional transfers utilizing least restrictive device and durable medical equipment as needed with modified independence and Good balance and safety awareness.   4.  Patient will perform standing static/dynamic activity for improved ADL function with modified independence and Good balance and safety awareness. 5.  Patient will improve Barthel index score to 95/100 to improve independence with mobility. 6. Patient will perform UE HEP with Modified [place] to increase BUE strength for continued participation in ADLs.     Therapist: Amirah Shaikh    4/30/2019                Outcome: Progressing Towards Goal    [facility]   OCCUPATIONAL THERAPY DAILY TREATMENT NOTE      Patient: Steff Fitch (89 y.o. male)       Date: 5/30/2019  Attending Physician: Mili Hernadez MD  Primary Diagnosis: rt hip fracture    Treatment Diagnosis  Treatment Diagnosis: need for assist with self care  Treatment Diagnosis 2: muscle weakness   Precautions : Precautions at Admission: PWB(50% thru RLE)  Vital Signs:  Vital Signs  Temp: 97.1 °F (36.2 °C)  Temp Source: Oral  Pulse (Heart Rate): 63  Resp Rate: 19  O2 Sat (%): 97 %  BP: 108/63  MAP (Calculated): 78     Cognitive Status:  Mental Status  Neurologic State: Confused  Pain:        Gross Assessment:     Coordination:     Bed Mobility:  Bed Mobility  Rolling: Modified independent  Supine to Sit: Modified independent  Scooting: Modified independent  Transfers:  Functional Transfers  Sit to Stand: Stand-by assistance  Stand to Sit: Stand-by assistance  Bed to Chair: Stand-by assistance(w/ RW )  Shower Transfer: Stand-by assistance(w/ RW & grab bars)  Functional Transfers  Shower Transfer: Stand-by assistance(w/ RW & grab bars)  Balance:  Balance  Sitting: Intact  Sitting - Static: Good (unsupported)  Sitting - Dynamic: Fair (occasional)  Standing: With support  Standing - Static: Fair  Standing - Dynamic : Fair(-)  ADL Self Care:  Basic ADL  Type of Bath: Shower  ADL Intervention:  Upper Body Bathing  Bathing Assistance: Stand-by assistance  Position Performed: (seated on shower seat w/ back)  Upper Body Dressing Assistance  Dressing Assistance: Supervision  Pullover Shirt: Supervision  Lower Body Bathing  Bathing Assistance: Stand-by assistance  Perineal  : Stand-by assistance  Position Performed: Standing(using grab bars)  Lower Body : Stand-by assistance  Position Performed: (seated on shower seat w/ back)     Grooming  Washing Face: Set-up; Modified independent  Brushing/Combing Hair: Set-up; Modified independent  Lower Body Dressing Assistance  Protective Undergarmet: Stand-by assistance;Minimum assistance(Min A thread over feet w/ reacher,SBA hike over hips )  Pants With Elastic Waist: Stand-by assistance;Minimum assistance(Min A thread over feet w/ reacher,SBA hike over hips )  Socks: Stand-by assistance(Mod I L sock, SBA R sock w/ reacher and sock aide)       Therapeutic Activities:  Patient's spouse provided pt's CLOF w/ ADL performance and use of AE, spouse declines caregiver ed d/t previous knowledge for use of AE. Patient's spouse reports walk in shower w/ ~ 10\" threshold to step over, OTR does not recommend pt to perform walk inshower transfer with RLE 50% PWB and allow OT [facility] services to address walk in shower transfer once doctor clarification received for increased WB status, in order to prevent falls and increase safety with [facility] OT services providing further recommendations and/or modifications. Patient/Caregiver Education:    Patient's spouse provided pt's CLOF w/ ADL performance and use of AE, spouse declines caregiver ed d/t previous knowledge for use of AE. Patient's spouse reports walk in shower w/ ~ 10\" threshold to step over, OTR does not recommend pt to perform walk inshower transfer with RLE 50% PWB and allow OT [facility] services to address walk in shower transfer once doctor clarification received for increased WB status, in order to prevent falls and increase safety with [facility] OT services providing further recommendations and/or modifications.      ASSESSMENT:  Patient continues to demonstrate the need for skilled Occupational Therapy services to improve grooming, bathing, upper body dressing, lower body dressing, toileting and toilet transfer  Progression toward goals:  ?      Improving appropriately and progressing toward goals  ? Improving slowly and progressing toward goals  ?       Not making progress toward goals and plan of care will be adjusted     Treatment session:   46 minutes    Therapist:    Simona Hayes,  5/30/2019

## 2019-05-30 NOTE — PROGRESS NOTES
Community Care Team Documentation for Patient in [facility]     Patient discharged from [facility] 4/26/2019 - 4/29/2019 to [facility], [address], on 4/29/2019. Hospital Discharge diagnosis:  Hip fracture. SNF Attending Provider:     Anticipated discharge date from SNF:       PCP : Kevin Morales MD    Nurse Navigator:     [facility] rounds completed, updates provided by facility. Low Risk            5       Total Score        3 Has Seen PCP in Last 6 Months (Yes=3, No=0)    2 . Living with Significant Other. Assisted Living. LTAC. SNF. or   Rehab        Criteria that do not apply:    Patient Length of Stay (>5 days = 3)    IP Visits Last 12 Months (1-3=4, 4=9, >4=11)    Pt.  Coverage (Medicare=5 , Medicaid, or Self-Pay=4)    Charlson Comorbidity Score (Age + Comorbid Conditions)      Active Ambulatory Problems     Diagnosis Date Noted    Hip fracture ([address] 04/26/2019    Closed right hip fracture, initial encounter ([address] 04/26/2019    Atrial fibrillation ([address] 04/26/2019    HTN (hypertension) 04/26/2019     Resolved Ambulatory Problems     Diagnosis Date Noted    No Resolved Ambulatory Problems     Past Medical History:   Diagnosis Date    Alzheimer's disease     Atrial fibrillation ([address]     Diabetes ([address]     Hypertension

## 2019-05-30 NOTE — ROUTINE PROCESS
Bedside and Verbal shift change report given to [facility] (oncoming nurse) by Gisselle Delarosa RN (offgoing nurse). Report included the following information SBAR, Kardex and MAR.

## 2019-05-31 LAB
GLUCOSE BLD STRIP.AUTO-MCNC: 131 MG/DL (ref 70–110)
GLUCOSE BLD STRIP.AUTO-MCNC: 152 MG/DL (ref 70–110)
GLUCOSE BLD STRIP.AUTO-MCNC: 202 MG/DL (ref 70–110)
GLUCOSE BLD STRIP.AUTO-MCNC: 72 MG/DL (ref 70–110)

## 2019-05-31 PROCEDURE — 82962 GLUCOSE BLOOD TEST: CPT

## 2019-05-31 PROCEDURE — 74011250637 HC RX REV CODE- 250/637: Performed by: INTERNAL MEDICINE

## 2019-05-31 PROCEDURE — 74011250637 HC RX REV CODE- 250/637: Performed by: NURSE PRACTITIONER

## 2019-05-31 PROCEDURE — 74011636637 HC RX REV CODE- 636/637: Performed by: INTERNAL MEDICINE

## 2019-05-31 RX ADMIN — METFORMIN HYDROCHLORIDE 1000 MG: 500 TABLET ORAL at 09:10

## 2019-05-31 RX ADMIN — WARFARIN SODIUM 2 MG: 2 TABLET ORAL at 17:32

## 2019-05-31 RX ADMIN — CHOLECALCIFEROL (VITAMIN D3) 25 MCG (1,000 UNIT) TABLET 1000 UNITS: at 09:10

## 2019-05-31 RX ADMIN — DOCUSATE SODIUM 100 MG: 100 CAPSULE, LIQUID FILLED ORAL at 09:10

## 2019-05-31 RX ADMIN — SIMVASTATIN 20 MG: 20 TABLET, FILM COATED ORAL at 21:30

## 2019-05-31 RX ADMIN — INSULIN LISPRO 4 UNITS: 100 INJECTION, SOLUTION INTRAVENOUS; SUBCUTANEOUS at 13:10

## 2019-05-31 RX ADMIN — INSULIN LISPRO 2 UNITS: 100 INJECTION, SOLUTION INTRAVENOUS; SUBCUTANEOUS at 21:32

## 2019-05-31 RX ADMIN — PANTOPRAZOLE SODIUM 40 MG: 40 TABLET, DELAYED RELEASE ORAL at 09:10

## 2019-05-31 RX ADMIN — METFORMIN HYDROCHLORIDE 1000 MG: 500 TABLET ORAL at 17:32

## 2019-05-31 RX ADMIN — POLYETHYLENE GLYCOL 3350 17 G: 17 POWDER, FOR SOLUTION ORAL at 09:10

## 2019-05-31 RX ADMIN — LISINOPRIL 10 MG: 10 TABLET ORAL at 09:10

## 2019-05-31 NOTE — ROUTINE PROCESS
Pt assisted to ambulate to bathroom patient offered assistance to bathroom hourly, Pt has no signs of obvious distress at this time.

## 2019-05-31 NOTE — PROGRESS NOTES
[address] [facility]   PHYSICAL THERAPY DAILY TREATMENT NOTE      Patient: Mert William (15 y.o. male)               Date: 5/31/2019    Physician: Zia Medina MD  Primary Diagnosis: rt hip fracture          Treatment Diagnosis  Treatment Diagnosis: need for assist with self care  Treatment Diagnosis 2: muscle weakness  Precautions: PWB(50% thru RLE)  Vital Signs  Vital Signs  Temp: 96.3 °F (35.7 °C)  Temp Source: Oral  Pulse (Heart Rate): 60  Resp Rate: 18  O2 Sat (%): 98 %  BP: 111/66  MAP (Calculated): 81     Cognitive Status:     Pain     Bed Mobility Training     Balance     Transfer Training        Gait Training                     With 5 turns. Treatment:    transfers with mod (I). Gait training 227 ft x 2 using RW with supervision, mod (I) for short distances, compliance with PWB. Stair training x 5 stairs using B HR with close supervision, PWB compliance. Dynamic standing balance x 5 min, 6 min, 3 min with facilitation of reaching outside PINA, across midline and thru dancing with minimal UE support. Patient/Caregiver Education:   Pt /Caregiver Education on progress with PT services. ASSESSMENT:  Patient continues to benefit from Skilled PT services to improve dynamic standing balance. Progression toward goals:  ? X      Improving appropriately and progressing toward goals  ? Improving slowly and progressing toward goals  ? Not making progress toward goals and plan of care will be adjusted     Treatment session: 46 minutes.   Therapist:   Moisés Pruitt PT,          5/31/2019

## 2019-05-31 NOTE — ROUTINE PROCESS
Bedside, Verbal and Written shift change report given to jose alejandro damon (oncoming nurse) by Navdeep Rossi (offgoing nurse). Report included the following information SBAR, Intake/Output and MAR.

## 2019-05-31 NOTE — ROUTINE PROCESS
Bedside and Verbal shift change report given to Jeb Chance LPN (oncoming nurse) by Tierra Grey LPN (offgoing nurse). Report included the following information SBAR, Kardex and MAR.

## 2019-06-01 LAB
GLUCOSE BLD STRIP.AUTO-MCNC: 129 MG/DL (ref 70–110)
GLUCOSE BLD STRIP.AUTO-MCNC: 138 MG/DL (ref 70–110)
GLUCOSE BLD STRIP.AUTO-MCNC: 170 MG/DL (ref 70–110)
GLUCOSE BLD STRIP.AUTO-MCNC: 174 MG/DL (ref 70–110)

## 2019-06-01 PROCEDURE — 74011636637 HC RX REV CODE- 636/637: Performed by: INTERNAL MEDICINE

## 2019-06-01 PROCEDURE — 74011250637 HC RX REV CODE- 250/637: Performed by: NURSE PRACTITIONER

## 2019-06-01 PROCEDURE — 82962 GLUCOSE BLOOD TEST: CPT

## 2019-06-01 PROCEDURE — 74011250637 HC RX REV CODE- 250/637: Performed by: INTERNAL MEDICINE

## 2019-06-01 RX ADMIN — CHOLECALCIFEROL (VITAMIN D3) 25 MCG (1,000 UNIT) TABLET 1000 UNITS: at 08:43

## 2019-06-01 RX ADMIN — DOCUSATE SODIUM 100 MG: 100 CAPSULE, LIQUID FILLED ORAL at 08:43

## 2019-06-01 RX ADMIN — INSULIN LISPRO 2 UNITS: 100 INJECTION, SOLUTION INTRAVENOUS; SUBCUTANEOUS at 21:27

## 2019-06-01 RX ADMIN — METFORMIN HYDROCHLORIDE 1000 MG: 500 TABLET ORAL at 08:43

## 2019-06-01 RX ADMIN — INSULIN LISPRO 2 UNITS: 100 INJECTION, SOLUTION INTRAVENOUS; SUBCUTANEOUS at 12:53

## 2019-06-01 RX ADMIN — METFORMIN HYDROCHLORIDE 1000 MG: 500 TABLET ORAL at 17:27

## 2019-06-01 RX ADMIN — PANTOPRAZOLE SODIUM 40 MG: 40 TABLET, DELAYED RELEASE ORAL at 08:43

## 2019-06-01 RX ADMIN — WARFARIN SODIUM 2 MG: 2 TABLET ORAL at 17:27

## 2019-06-01 RX ADMIN — SIMVASTATIN 20 MG: 20 TABLET, FILM COATED ORAL at 21:24

## 2019-06-01 RX ADMIN — Medication 1000 MG: at 09:00

## 2019-06-01 RX ADMIN — POLYETHYLENE GLYCOL 3350 17 G: 17 POWDER, FOR SOLUTION ORAL at 08:41

## 2019-06-01 RX ADMIN — LISINOPRIL 10 MG: 10 TABLET ORAL at 08:41

## 2019-06-01 NOTE — ROUTINE PROCESS
Patient remains alert with confusion which is his baseline. Patient able to make needs known. Patient wife at bedside . All medications administered as per physician orders.

## 2019-06-02 LAB
GLUCOSE BLD STRIP.AUTO-MCNC: 122 MG/DL (ref 70–110)
GLUCOSE BLD STRIP.AUTO-MCNC: 144 MG/DL (ref 70–110)
GLUCOSE BLD STRIP.AUTO-MCNC: 156 MG/DL (ref 70–110)
GLUCOSE BLD STRIP.AUTO-MCNC: 156 MG/DL (ref 70–110)

## 2019-06-02 PROCEDURE — 74011250637 HC RX REV CODE- 250/637: Performed by: INTERNAL MEDICINE

## 2019-06-02 PROCEDURE — 82962 GLUCOSE BLOOD TEST: CPT

## 2019-06-02 PROCEDURE — 74011636637 HC RX REV CODE- 636/637: Performed by: INTERNAL MEDICINE

## 2019-06-02 PROCEDURE — 74011250637 HC RX REV CODE- 250/637: Performed by: NURSE PRACTITIONER

## 2019-06-02 RX ADMIN — SIMVASTATIN 20 MG: 20 TABLET, FILM COATED ORAL at 21:06

## 2019-06-02 RX ADMIN — OXYCODONE HYDROCHLORIDE AND ACETAMINOPHEN 1 TABLET: 5; 325 TABLET ORAL at 08:34

## 2019-06-02 RX ADMIN — PANTOPRAZOLE SODIUM 40 MG: 40 TABLET, DELAYED RELEASE ORAL at 08:36

## 2019-06-02 RX ADMIN — POLYETHYLENE GLYCOL 3350 17 G: 17 POWDER, FOR SOLUTION ORAL at 08:36

## 2019-06-02 RX ADMIN — DOCUSATE SODIUM 100 MG: 100 CAPSULE, LIQUID FILLED ORAL at 08:36

## 2019-06-02 RX ADMIN — Medication 1000 MG: at 09:00

## 2019-06-02 RX ADMIN — WARFARIN SODIUM 2 MG: 2 TABLET ORAL at 17:19

## 2019-06-02 RX ADMIN — CHOLECALCIFEROL (VITAMIN D3) 25 MCG (1,000 UNIT) TABLET 1000 UNITS: at 08:36

## 2019-06-02 RX ADMIN — METFORMIN HYDROCHLORIDE 1000 MG: 500 TABLET ORAL at 08:33

## 2019-06-02 RX ADMIN — INSULIN LISPRO 2 UNITS: 100 INJECTION, SOLUTION INTRAVENOUS; SUBCUTANEOUS at 16:36

## 2019-06-02 RX ADMIN — METFORMIN HYDROCHLORIDE 1000 MG: 500 TABLET ORAL at 16:37

## 2019-06-02 RX ADMIN — OXYCODONE HYDROCHLORIDE AND ACETAMINOPHEN 1 TABLET: 5; 325 TABLET ORAL at 21:06

## 2019-06-02 RX ADMIN — LISINOPRIL 10 MG: 10 TABLET ORAL at 08:33

## 2019-06-02 RX ADMIN — INSULIN LISPRO 2 UNITS: 100 INJECTION, SOLUTION INTRAVENOUS; SUBCUTANEOUS at 12:48

## 2019-06-02 NOTE — PROGRESS NOTES
[address] [facility]   PHYSICAL THERAPY DAILY TREATMENT NOTE      Patient: Leena Tong (8 y.o. male)               Date: 6/2/2019    Physician: Mak Quick MD  Primary Diagnosis: rt hip fracture          Treatment Diagnosis  Treatment Diagnosis: need for assist with self care  Treatment Diagnosis 2: muscle weakness  Precautions: PWB(50% thru RLE)  Vital Signs  Cognitive Status:  Pain  Bed Mobility Training  Bed Mobility Training  Rolling: Modified independent  Supine to Sit: Modified independent  Balance  Sitting: Intact  Sitting - Static: Good (unsupported)  Sitting - Dynamic: Good (unsupported)  Standing: With support  Standing - Static: Fair  Standing - Dynamic : Fair(+)  Transfer Training  Transfer Training  Sit to Stand: Modified independent  Stand to Sit: Modified independent  Sit to Stand: Modified independent  Gait Training  Gait  Base of Support: Center of gravity altered  Speed/Tamanna: Slow  Step Length: Left shortened;Right shortened  Gait Abnormalities: Decreased step clearance  Ambulation - Level of Assistance: Stand-by assistance  Distance (ft): 290 Feet (ft)  Assistive Device: Gait belt;Walker, rolling  Gait Abnormalities: Decreased step clearance  Right Side Weight Bearing: Partial (%)(50%)  Treatment:   Pt continues to progress well towards reaching functional goals and able to amb from room->gym with supervision/SBA using FWW with good compliance of maintaining PWB 50% on R LE with min cues for technique during gait. Pt completed seated TE AROM BLE's 2.5#'s: ankle pumps, LAQ's, hip flexion marches, and light green TB (hip abd, hamstring curls) 3x15 reps to improve overall strength/endurance with performing transfers and gait activities. BP: 95/55, HR: 92 bpm, O2: 96%.      Patient/Caregiver Education:   Pt /Caregiver Education on safety sequencing techniques during functional transfers and importance of maintaining PWB restriction on R LE during standing tasks to promote healing and maximize functional gains upon return to home. Pt verbalized and demonstrated understanding. ASSESSMENT:  Patient continues to benefit from Skilled PT services to improve ROM, strength, endurance, balance, and functional mobility while maintaining WB precautions. Progression toward goals:  ?      Improving appropriately and progressing toward goals  ? Improving slowly and progressing toward goals  ? Not making progress toward goals and plan of care will be adjusted     Treatment session: 50 minutes.   Therapist:   Adrienne Pena PTA,          6/2/2019

## 2019-06-02 NOTE — ROUTINE PROCESS
Patient continues to ambulate to bathroom without calling for help. No complaints of pain or discomfort.

## 2019-06-03 LAB
ANION GAP SERPL CALC-SCNC: 8 MMOL/L (ref 3–18)
BASOPHILS # BLD: 0 K/UL (ref 0–0.1)
BASOPHILS NFR BLD: 1 % (ref 0–2)
BUN SERPL-MCNC: 20 MG/DL (ref 7–18)
BUN/CREAT SERPL: 26 (ref 12–20)
CALCIUM SERPL-MCNC: 8.4 MG/DL (ref 8.5–10.1)
CHLORIDE SERPL-SCNC: 104 MMOL/L (ref 100–108)
CO2 SERPL-SCNC: 26 MMOL/L (ref 21–32)
CREAT SERPL-MCNC: 0.76 MG/DL (ref 0.6–1.3)
DIFFERENTIAL METHOD BLD: ABNORMAL
EOSINOPHIL # BLD: 0.3 K/UL (ref 0–0.4)
EOSINOPHIL NFR BLD: 4 % (ref 0–5)
ERYTHROCYTE [DISTWIDTH] IN BLOOD BY AUTOMATED COUNT: 14.5 % (ref 11.6–14.5)
GLUCOSE BLD STRIP.AUTO-MCNC: 100 MG/DL (ref 70–110)
GLUCOSE BLD STRIP.AUTO-MCNC: 117 MG/DL (ref 70–110)
GLUCOSE BLD STRIP.AUTO-MCNC: 132 MG/DL (ref 70–110)
GLUCOSE BLD STRIP.AUTO-MCNC: 177 MG/DL (ref 70–110)
GLUCOSE SERPL-MCNC: 119 MG/DL (ref 74–99)
HCT VFR BLD AUTO: 33.7 % (ref 36–48)
HGB BLD-MCNC: 10.9 G/DL (ref 13–16)
INR PPP: 1.9 (ref 0.8–1.2)
LYMPHOCYTES # BLD: 1.6 K/UL (ref 0.9–3.6)
LYMPHOCYTES NFR BLD: 27 % (ref 21–52)
MCH RBC QN AUTO: 27.7 PG (ref 24–34)
MCHC RBC AUTO-ENTMCNC: 32.3 G/DL (ref 31–37)
MCV RBC AUTO: 85.5 FL (ref 74–97)
MONOCYTES # BLD: 0.6 K/UL (ref 0.05–1.2)
MONOCYTES NFR BLD: 10 % (ref 3–10)
NEUTS SEG # BLD: 3.4 K/UL (ref 1.8–8)
NEUTS SEG NFR BLD: 58 % (ref 40–73)
PLATELET # BLD AUTO: 247 K/UL (ref 135–420)
PMV BLD AUTO: 10.5 FL (ref 9.2–11.8)
POTASSIUM SERPL-SCNC: 4.6 MMOL/L (ref 3.5–5.5)
PROTHROMBIN TIME: 22.1 SEC (ref 11.5–15.2)
RBC # BLD AUTO: 3.94 M/UL (ref 4.7–5.5)
SODIUM SERPL-SCNC: 138 MMOL/L (ref 136–145)
WBC # BLD AUTO: 6 K/UL (ref 4.6–13.2)

## 2019-06-03 PROCEDURE — 80048 BASIC METABOLIC PNL TOTAL CA: CPT

## 2019-06-03 PROCEDURE — 74011250637 HC RX REV CODE- 250/637: Performed by: NURSE PRACTITIONER

## 2019-06-03 PROCEDURE — 36415 COLL VENOUS BLD VENIPUNCTURE: CPT

## 2019-06-03 PROCEDURE — 85025 COMPLETE CBC W/AUTO DIFF WBC: CPT

## 2019-06-03 PROCEDURE — 82962 GLUCOSE BLOOD TEST: CPT

## 2019-06-03 PROCEDURE — 85610 PROTHROMBIN TIME: CPT

## 2019-06-03 PROCEDURE — 74011636637 HC RX REV CODE- 636/637: Performed by: INTERNAL MEDICINE

## 2019-06-03 PROCEDURE — 74011250637 HC RX REV CODE- 250/637: Performed by: INTERNAL MEDICINE

## 2019-06-03 RX ADMIN — WARFARIN SODIUM 2 MG: 2 TABLET ORAL at 17:34

## 2019-06-03 RX ADMIN — SIMVASTATIN 20 MG: 20 TABLET, FILM COATED ORAL at 21:15

## 2019-06-03 RX ADMIN — CHOLECALCIFEROL (VITAMIN D3) 25 MCG (1,000 UNIT) TABLET 1000 UNITS: at 08:03

## 2019-06-03 RX ADMIN — DOCUSATE SODIUM 100 MG: 100 CAPSULE, LIQUID FILLED ORAL at 08:03

## 2019-06-03 RX ADMIN — METFORMIN HYDROCHLORIDE 1000 MG: 500 TABLET ORAL at 08:03

## 2019-06-03 RX ADMIN — METFORMIN HYDROCHLORIDE 1000 MG: 500 TABLET ORAL at 17:33

## 2019-06-03 RX ADMIN — POLYETHYLENE GLYCOL 3350 17 G: 17 POWDER, FOR SOLUTION ORAL at 08:04

## 2019-06-03 RX ADMIN — LISINOPRIL 10 MG: 10 TABLET ORAL at 08:03

## 2019-06-03 RX ADMIN — INSULIN LISPRO 2 UNITS: 100 INJECTION, SOLUTION INTRAVENOUS; SUBCUTANEOUS at 11:30

## 2019-06-03 RX ADMIN — PANTOPRAZOLE SODIUM 40 MG: 40 TABLET, DELAYED RELEASE ORAL at 08:03

## 2019-06-03 NOTE — ROUTINE PROCESS
Bedside and Verbal shift change report given to Seymour lpn (oncoming nurse) by yumiko jimenez (offgoing nurse). Report included the following information Kardex, MAR and Recent Results.

## 2019-06-03 NOTE — PROGRESS NOTES
[address] [facility]   PHYSICAL THERAPY DAILY TREATMENT NOTE      Patient: Art Rodriguez (78 y.o. male)               Date: 6/3/2019    Physician: Cristi Paul MD  Primary Diagnosis: rt hip fracture          Treatment Diagnosis  Treatment Diagnosis: need for assist with self care  Treatment Diagnosis 2: muscle weakness  Precautions: PWB(50% thru RLE)  Vital Signs  Vital Signs  Temp: 96.9 °F (36.1 °C)  Temp Source: Oral  Cardiac Rhythm: Atrial fibrillation  Resp Rate: 19  O2 Sat (%): 93 %  BP: 113/63  Cognitive Status:  Mental Status  Neurologic State: Alert  Orientation Level: Oriented to person;Oriented to situation  Cognition: Follows commands;Memory loss  Pain  Pain Screen  Pain Scale 1: Numeric (0 - 10)  Pain Intensity 1: 0  Patient Stated Pain Goal: 0  Bed Mobility Training  Bed Mobility Training  Rolling: Modified independent  Supine to Sit: Modified independent  Balance  Sitting: Intact  Sitting - Static: Good (unsupported)  Sitting - Dynamic: Good (unsupported)  Standing: With support  Standing - Static: Fair  Standing - Dynamic : Fair(+)  Transfer Training  Transfer Training  Sit to Stand: Modified independent(with ocassional VC's for safety techniques)  Stand to Sit: Modified independent  Bed to Chair: Supervision  Sit to Stand: Modified independent(with ocassional VC's for safety techniques)  Gait Training  Gait  Base of Support: Center of gravity altered  Speed/Tamanna: Slow  Step Length: Left shortened;Right shortened  Gait Abnormalities: Decreased step clearance  Ambulation - Level of Assistance: Stand-by assistance  Distance (ft): 289 Feet (ft)(x2 reps)  Assistive Device: Gait belt;Walker, rolling  Gait Abnormalities: Decreased step clearance  Right Side Weight Bearing: Partial (%)(50%)  Treatment:   Pt continues to progress well towards goals and amb from room<->gym (S) using FWW, requiring min VC's for PWB 50% on R LE with good carry over after receiving VC's and keeping PINA within AD for safety during mobility. Pt completed seated TE AROM 2.5#'s: ankle pumps, LAQ's, hip flexion marches, and blue TB (hip abd, hamstring curls) 2x15 reps to increase strength/endurance with performing functional upright tasks. Pt completed static/dynamic standing (S) with 1-UE reaching outside PINA in multiple planes with scale for visual cues to adhere with PWB 50% on R LE throughout activity to improve balance strategies and righting reactions. Patient/Caregiver Education:   Pt /Caregiver Education on benefits of exercise with written handout given and importance of maintaining WB precautions at all times during standing tasks, transfers, and gait activities. Pt verbalized and demonstrated understanding after receiving cues. ASSESSMENT:  Patient continues to benefit from Skilled PT services to improve strength, endurance, balance, and functional mobility while maintaining PWB 50% restriction  Progression toward goals:  ?      Improving appropriately and progressing toward goals  ? Improving slowly and progressing toward goals  ? Not making progress toward goals and plan of care will be adjusted     Treatment session: 57 minutes.   Therapist:   Luna Mcleod PTA,          6/3/2019

## 2019-06-03 NOTE — ROUTINE PROCESS
Bedside and Verbal shift change report given to Suad (oncoming nurse) by Librado Boone RN (offgoing nurse). Report included the following information SBAR, Kardex and MAR.

## 2019-06-03 NOTE — PROGRESS NOTES
[facility] [place]  OCCUPATIONAL THERAPY WEEKLY SUMMARY   Reporting period:  from 5/27/2019 through 6/3/2019       Patient: Grace Valverde (89 y.o. male)   Date: 6/3/2019    Primary Diagnosis: rt hip fracture       Attending Physician: Kristin Schwab MD Treatment Diagnosis  Treatment Diagnosis: need for assist with self care  Treatment Diagnosis 2: muscle weakness  Precautions: PWB(50% thru RLE)  Rehab Potential : Excellent    Skill interventions and education provided with clinical rationale (include individualized treatment techniques and standardized tests):  Skilled Occupational services were provided utilizing ADL training and AE training for compensatory strategies to facilitate independence with self cares, FWW for [facility] management and wc/c training for increased safety with functional transfers to reduce risk for falls. Pt engaged in balance training to increase pt safety awareness with self cares and functional transfers. Pt engaged in BUE strengthening to increase pt overall strength for ADL/IADL's to return to prior level of function, FM coordination training to improve pt performance with self cares. Pt provided education for ADL training, transfer training and safe functional transfers, PWB precautions and increased safety for self cares as well as fall prevention to reduce risk for falls with self cares.     Using a comparative statement, summarize significant progress toward goals as a result of skilled intervention provided:  Patient has made Good progress towards their Occupational Therapy goals in the following areas: self-feeding, grooming, bathing, upper body dressing, lower body dressing, toileting, toilet transfer, shower transfer, tub transfer and simple home management using Reacher  Identify remaining functional areas, impairments limiting progress and/or barriers to improvement:  Patient would benefit from continued skilled Occupational Therapy Services to address the following functional deficits in BUE strength, standing balance, activity tolerance and safety for ADL/IADL's. OBJECTIVE DATA SUMMARY:       INITIAL ASSESSMENT WEEKLY PROGRESS   COGNITIVE STATUS: COGNITIVE STATUS:   Neurologic State: Alert  Orientation Level: Oriented X4(forgetful at times)  Cognition: Follows commands  Perception: Appears intact  Perseveration: No perseveration noted  Safety/Judgement: Fall prevention Neurologic State: Alert  Orientation Level: Oriented to person, Oriented to situation  Cognition: Follows commands, Memory loss  Perception: Appears intact  Perseveration: No perseveration noted  Safety/Judgement: Fall prevention   PAIN: PAIN:   Pain Scale 1: Numeric (0 - 10)  Pain Intensity 1: 4  Pain Onset 1: acute  Pain Location 1: Hip  Pain Orientation 1: Right  Pain Description 1: Aching  Pain Intervention(s) 1: Medication (see MAR)  Patient Stated Pain Goal: 0  Pain Reassessment 1: Yes     Pain Scale 1: Numeric (0 - 10)  Pain Intensity 1: 0  Pain Onset 1: movement  Pain Location 1: Hip  Pain Orientation 1: Right  Pain Description 1: Aching  Pain Intervention(s) 1: Medication (see MAR), Repositioned, Rest  Patient Stated Pain Goal: 0  Pain Reassessment 1: Yes   BED MOBILITY BED MOBILITY   Rolling:  Moderate assistance, Assist x2  Supine to Sit: Moderate assistance(for RLE management)  Scooting: Minimum assistance Rolling: Modified independent  Supine to Sit: Modified independent  Scooting: Modified independent   ADL SELF CARE ADL SELF CARE   Oral Facial Hygiene/Grooming: Setup  Type of Bath: Basin/Soap/Water  Upper Body Dressing: Stand-by assistance(donning pullover shirt)  Lower Body Dressing: Maximum assistance(2/2 R LE pain)  Toileting: Minimum assistance(A with clothing mgmt) Bathing Assistance: (Pt declined UB/LB bathing this date)  Position Performed: (seated on shower seat w/ back)  Cues: Verbal cues provided, Visual cues provided    Dressing Assistance: Set-up, [address]ch[address] Chauhan 58: Minimum  assistance  Shirt simulation with hospital gown: Minimum  assistance  Pullover Shirt: Supervision  Front Opened Shirt: Modified independent(Pt able to zip and button front-open shirt/sweater)    Bathing Assistance: Stand-by assistance  Perineal  : Stand-by assistance  Position Performed: Standing(using grab bars)  Cues: Verbal cues provided(to maintain RLE TTWB)  Lower Body : (Silas to thread BLE feet through pants with reacher)  Position Performed: (seated on shower seat w/ back)    Toileting Assistance: Minimum assistance(Fair safety)  Bladder Hygiene: Modified independent(urinal)  Bowel Hygiene: Total assistance (dependent)  Clothing Management: Minimum assistance  Adaptive Equipment: Walker(BSC)    Grooming Assistance: Set-up  Washing Face: Modified independent, Supervision  Washing Hands: Modified independent, Supervision  Brushing Teeth: Supervision, Set-up(seated w/c level at bedside table)  Shaving: Supervision  Brushing/Combing Hair: Set-up, Modified independent    Dressing Assistance: Stand-by assistance, Minimum assistance(Silas for threading BLE feet through pants with reacher)  Protective Undergarmet: Stand-by assistance, Minimum assistance(Min A thread over feet w/ reacher,SBA hike over hips )  Pants With Elastic Waist: Minimum assistance  Pants With Button/Zipper: Moderate assistance  Socks: Stand-by assistance  Shoes with Velcro: Moderate assistance  Leg Crossed Method Used: No  Position Performed: Seated in chair, Standing  Cues: Physical assistance, Verbal cues provided(for weight bearing reminders)  Adaptive Equipment Used: Reacher    Feeding Assistance: Modified independent  Container Management: Total assistance (dependent)  Cutting Food: Total assistance (dependent)  Utensil Management: Modified independent  Food to Mouth: Modified independent  Drink to Mouth: Modified independent   TRANSFERS TRANSFERS   Sit to Stand:  Moderate assistance  Stand to Sit: Moderate assistance  Bed to Chair: Minimum assistance  Toilet Transfer : Moderate assistance, Assist x1  Shower Transfer: Stand-by assistance(w/ RW & grab bars) Sit to Stand: Modified independent, Supervision(VC's for safety and w/c management)  Stand to Sit: Modified independent  Bed to Chair: Supervision  Toilet Transfer : Contact guard assistance  Shower Transfer: Stand-by assistance(w/ RW & grab bars)   Toilet Transfer : Moderate assistance, Assist x1  Shower Transfer: Stand-by assistance(w/ RW & grab bars)  Cues: Verbal cues provided(to maintain RLE TTWB)  Adaptive Equipment: Cyrilla Crape (comment), Walker (comment) Toilet Transfer : Contact guard assistance  Shower Transfer: Stand-by assistance(w/ RW & grab bars)  Cues: Verbal cues provided(to maintain RLE TTWB)  Adaptive Equipment: Walker (comment)(FWW)   BALANCE BALANCE   Sitting: With support  Sitting - Static: Fair (occasional)  Sitting - Dynamic: Fair (occasional)  Standing: With support, Impaired  Standing - Static: Fair  Standing - Dynamic : Fair(-) Sitting: Intact  Sitting - Static: Good (unsupported)  Sitting - Dynamic: Good (unsupported)  Standing: With support  Standing - Static: Fair  Standing - Dynamic : Fair(+)       GROSS ASSESSMENT  GROSS ASSESSMENT   AROM: Generally decreased, functional  PROM: Generally decreased, functional  Strength: Generally decreased, functional(R hip 2-5, R knee 4+/5; L hip 4-/5, L knee 4+/5)  Coordination: Generally decreased, functional  Tone: Normal  Sensation: Intact(BUEs) AROM: Within functional limits  PROM: Generally decreased, functional  Strength:  Within functional limits  Coordination: Within functional limits(BUEs)  Tone: Normal(BUEs)  Sensation: Intact(BUEs)   COORDINATION COORDINATION   Fine Motor Skills-Upper: Left Intact, Right Intact  Gross Motor Skills-Upper: Left Intact, Right Intact Fine Motor Skills-Upper: Left Intact, Right Intact  Gross Motor Skills-Upper: Left Intact, Right Intact   VISUAL/PERCEPTUAL VISUAL/PERCEPTUAL   Corrective Lenses: Reading glasses   Corrective Lenses: Reading glasses     AUDITORY: AUDITORY:   Auditory Impairment: Hard of hearing, bilateral Auditory Impairment: Hard of hearing, left side       INSTRUMENTAL  ADL'S:    INSTRUMENTAL ADL'S:          THE BARTHEL INDEX  ACTIVITY   SCORE   FEEDING  0=unable  5=needs help cutting,spreading butter,etc., or modified diet  10= independent   10   BATHING  0=dependent  5=independent (or in shower   0   GROOMING  0=needs help  5=independent face/hair/teeth/shaving (implements provided)   0   DRESSING  0=dependent  5=needs help but can do about half unaided  10=independent(including buttons, zips,laces etc.)   5   BOWELS  0=incontinent  5=occasional accident  10=continent   10   BLADDER  0=incontinent, or catheterized and unable to manage alone  5=occasional accident  10=continent   5   TOILET USE  0=dependent  5=needs some help, but can do something alone  10=independent (on and off, dressing, wiping)   5   TRANSFER (BED TO CHAIR AND BACK)  0=unable, no sitting balance  5=major help(one or two people,physical), can sit  10=minor help(verbal or physical)  15=independent   10   MOBILITY (ON LEVEL SURFACES)  0=immobile or <50 yards  5=wheelchair independent,including corners,>50 yards  10=walkes with help of one person (verbal or physical) >50 yards  15=independent(but may use any aid; for example, stick) >50 yards   10   STAIRS  0=unable  5=needs help (verbal, physical, carrying aid)  10=independent   5            TOTAL:                  60/100     Treatment: Pt engaged in grooming, dressing and toileting tasks this date for sake of assessing pt progress toward goals. Pt performed LB dressing with SBA following education and encouragement for use of sock aide to facilitate increased independence with self cares. Pt required no verbal prompts following setup. Pt performed sit to stand, bed transfer and toilet transfer with supervision/SBA.  Pt performed grooming tasks in front of sink in seated position with supervision (shaving and washing face, combing hair). Pt performed toileting with supervision/SBA and min verbal prompts for positioning and safety. Pt demonstrated improved sanding balance abiding PWB precautions, with VC's. Pt engaged in BUE strengthening to increase pt overall strength and activity tolerance for self cares with restorator completing 15 continuous minutes and maintaining speed over 8 with VC's for encouragement. OTR discussed pt progress, performance, POC and goals with pt. Patient's response to Treatment rendered:  Pt calm and cooperative throughout session and demonstrated ability to follow verbal cues and prompts with self cares and no fatigue, discomfort or pain    Patient expected Discharge Location:  ? Private Residence  ? prison/ILF  ? LTC  ? Other:    Plan: Continue OT services as established on the Plan of Care for 5-7 days a week, 1-2 times per day for 4 weeks. Treatment Minutes:  65 minutes  OT and Assistant have had a weekly case conference regarding the above treatment:  ? Yes     ?  No    Therapist:   Diego Lopez OT,         Date:6/3/2019     Forward to OT for co-signature when completed

## 2019-06-03 NOTE — PROGRESS NOTES
I have reviewed this patient's current medication list and recent laboratory results. INR has declined to 1.9 today. A modest dosage increase may be indicated. Thank you,  Ariane GUADARRAMA  Ph. M. S.  6/3/2019

## 2019-06-03 NOTE — PROGRESS NOTES
[facility]    Daily progress Note    Patient: Clover Kelly MRN: [id number]  CSN: [id number]    YOB: 1931  Age: 80 y.o. Sex: male    DOA: 4/29/2019 LOS:  LOS: 35 days                    Subjective:     CC: follow up delirium     Ms. Zenon Ortega is a 80 yr old female who was recently hospitalized 4/26-4/29. Patient came in ER post mechanical fall at home. XRAY showed  Impacted subcapital right femoral neck fracture with moderately severe right hip joint osteoarthritis. Ortho saw patient and had right hip percutaneous pinning on 4/26 and tolerated. Patient had episode of bradycardia which resolved.  Patient coumadin restarted with PT/INR monitoring. Patient improved and was sent to [facility] for rehab. I examined him today, he is sitting up in w/c, NAD. He is alert and verbal, surgical healed, no issue. I spoke with patients son on Friday, he is concern about possible  hallucinations , probable acute delirium. Patient has UA done doesn't lks infected, labs stable. Today he is sitting up in activity area doing puzzle, he denies any pain to r hip. Discussed with aid, he had all his breakfast this AM. Patient pain med was decrease due to possible cause of hallucination. Today, he appears to be at baseline, no hallucination by staff.  Labs reviewed stable, INR is pending for today.      PAST MEDICAL HX: Alzheimer's Disease, HTN AFIB, DM type 2      PAST SURGICAL Hx: hernia repair     SOCIAL Hx:      ALLERGIES: NKDA     ROSCONST: Fever, weight loss, fatigue or chills  HEENT: Recent changes in vision, vertigo  CV: Chest pain, palpitations, edema and varicosities  RESP: Cough, shortness of breath, wheezing  GI: Nausea, vomiting, abdominal pain, change in bowel habits,  : Hematuria, dysuria, frequency  MS: Weakness, right hip sorness  LYMPH/HEME: Anemia, bruising and history of blood transfusions  INTEG: Dermatitis, abnormal moles  NEURO: Dizziness, headache, fainting, seizures and stroke  PSYCH: Anxiety and depression              Objective:      Visit Vitals  /63   Pulse 70   Temp 96.9 °F (36.1 °C)   Resp 19   Ht 5' 9\" (1.753 m)   Wt 60.7 kg (133 lb 12.8 oz)   SpO2 93%   BMI 19.76 kg/m²       Physical Exam:  General appearance: alert, cooperative, no distress, appears stated age  HEENT: negative  Neck: supple, symmetrical, trachea midline, no adenopathy, thyroid: not enlarged, symmetric, no tenderness/mass/nodules, no carotid bruit and no JVD  Lungs: clear to auscultation bilaterally  Heart: regular rate and rhythm, S1, S2 normal, no murmur, click, rub or gallop  Abdomen: soft, non-tender. Bowel sounds normal. No masses,  no organomegaly  Pulses: 2+ and symmetric  Skin: Skin color, texture, turgor normal. No rashes or lesions      Intake and Output:  Current Shift:  No intake/output data recorded. Last three shifts:  No intake/output data recorded. Recent Results (from the past 24 hour(s))   GLUCOSE, POC    Collection Time: 06/02/19 12:31 PM   Result Value Ref Range    Glucose (POC) 156 (H) 70 - 110 mg/dL   GLUCOSE, POC    Collection Time: 06/02/19  4:36 PM   Result Value Ref Range    Glucose (POC) 156 (H) 70 - 110 mg/dL   GLUCOSE, POC    Collection Time: 06/02/19  9:06 PM   Result Value Ref Range    Glucose (POC) 144 (H) 70 - 110 mg/dL   CBC WITH AUTOMATED DIFF    Collection Time: 06/03/19  5:10 AM   Result Value Ref Range    WBC 6.0 4.6 - 13.2 K/uL    RBC 3.94 (L) 4.70 - 5.50 M/uL    HGB 10.9 (L) 13.0 - 16.0 g/dL    HCT 33.7 (L) 36.0 - 48.0 %    MCV 85.5 74.0 - 97.0 FL    MCH 27.7 24.0 - 34.0 PG    MCHC 32.3 31.0 - 37.0 g/dL    RDW 14.5 11.6 - 14.5 %    PLATELET 440 422 - 382 K/uL    MPV 10.5 9.2 - 11.8 FL    NEUTROPHILS 58 40 - 73 %    LYMPHOCYTES 27 21 - 52 %    MONOCYTES 10 3 - 10 %    EOSINOPHILS 4 0 - 5 %    BASOPHILS 1 0 - 2 %    ABS. NEUTROPHILS 3.4 1.8 - 8.0 K/UL    ABS. LYMPHOCYTES 1.6 0.9 - 3.6 K/UL    ABS. MONOCYTES 0.6 0.05 - 1.2 K/UL    ABS.  EOSINOPHILS 0.3 0.0 - 0.4 K/UL    ABS.  BASOPHILS 0.0 0.0 - 0.1 K/UL    DF AUTOMATED     METABOLIC PANEL, BASIC    Collection Time: 06/03/19  5:10 AM   Result Value Ref Range    Sodium 138 136 - 145 mmol/L    Potassium 4.6 3.5 - 5.5 mmol/L    Chloride 104 100 - 108 mmol/L    CO2 26 21 - 32 mmol/L    Anion gap 8 3.0 - 18 mmol/L    Glucose 119 (H) 74 - 99 mg/dL    BUN 20 (H) 7.0 - 18 MG/DL    Creatinine 0.76 0.6 - 1.3 MG/DL    BUN/Creatinine ratio 26 (H) 12 - 20      GFR est AA >60 >60 ml/min/1.73m2    GFR est non-AA >60 >60 ml/min/1.73m2    Calcium 8.4 (L) 8.5 - 10.1 MG/DL   GLUCOSE, POC    Collection Time: 06/03/19  5:58 AM   Result Value Ref Range    Glucose (POC) 117 (H) 70 - 110 mg/dL   GLUCOSE, POC    Collection Time: 06/03/19 11:03 AM   Result Value Ref Range    Glucose (POC) 177 (H) 70 - 110 mg/dL         Current Facility-Administered Medications:     oxyCODONE-acetaminophen (PERCOCET) 5-325 mg per tablet 1 Tab, 1 Tab, Oral, Q6H PRN, Catarina BOLDEN NP, 1 Tab at 06/02/19 2106    docusate sodium (COLACE) capsule 100 mg, 100 mg, Oral, DAILY, Nelly Hansen NP, 100 mg at 06/03/19 0803    polyethylene glycol (MIRALAX) packet 17 g, 17 g, Oral, DAILY, Catarina BOLDEN NP, 17 g at 06/03/19 0804    [facility] ANESTHESIA, , Other, PRN, Daily Valdez MD    [facility] EMERGENCY/STAT BOX, , Other, PRN, Daily Valdez MD    warfarin (COUMADIN) tablet 2 mg, 2 mg, Oral, QPM, Daily Valdez MD, 2 mg at 06/02/19 1719    cholecalciferol (VITAMIN D3) tablet 1,000 Units, 1,000 Units, Oral, DAILY, Latosha Xiao MD, 1,000 Units at 06/03/19 0803    lisinopril (PRINIVIL, ZESTRIL) tablet 10 mg, 10 mg, Oral, DAILY, Latosha Xiao MD, 10 mg at 06/03/19 0803    metFORMIN (GLUCOPHAGE) tablet 1,000 mg, 1,000 mg, Oral, BID WITH MEALS, Latosha Xiao MD, 1,000 mg at 06/03/19 0803    pantoprazole (PROTONIX) tablet 40 mg, 40 mg, Oral, ACB, Daily Valdez MD, 40 mg at 06/03/19 0803    insulin lispro (HUMALOG) injection, , SubCUTAneous, AC&HS, Concepcion Shabazz MD, 2 Units at 06/03/19 1130    flaxseed oil cap 1,000 mg (Patient Supplied), 1,000 mg, Oral, DAILY, Concepcion Shabazz MD, 1,000 mg at 06/02/19 0900    WARFARIN INFORMATION NOTE (COUMADIN), , Other, PRN, Concepcion Shabazz MD    simvastatin (ZOCOR) tablet 20 mg, 20 mg, Oral, QHS, Concepcion Shabazz MD, 20 mg at 06/02/19 2106    magnesium hydroxide (MILK OF MAGNESIA) 400 mg/5 mL oral suspension 30 mL, 30 mL, Oral, DAILY PRN, Concepcion Shabazz MD, 30 mL at 05/21/19 0300    bisacodyl (DULCOLAX) suppository 10 mg, 10 mg, Rectal, DAILY PRN, Concepcion Shabazz MD, 10 mg at 05/14/19 0240    Lab Results   Component Value Date/Time    Glucose 119 (H) 06/03/2019 05:10 AM    Glucose 137 (H) 05/27/2019 02:00 AM    Glucose 138 (H) 05/20/2019 04:10 AM    Glucose 131 (H) 05/13/2019 04:20 AM    Glucose 116 (H) 05/06/2019 05:05 AM        Assessment/Plan   Closed right hip fracture: s/p right hip percutaneous pinning on 4/26.  Orthopedic surgery in 1 month     Constipated: continue colace + Miralax daily, and prn dulcolax suppository      AFIB: continue Coumadin with PT/INR monitoring  Monday and Thursday    DM type 2 , hgab1c 6.8 on 4/26, will continue metformin 1000 mg bid , renal function stable     HTN; BP stable on Lisinopril      Hyperlipidemia: continue Zocor      Continue PT/OT    ? Acute delirium w/ hallucination per family.  No report by staff, no signs of infection, will monitor    Rosa Ortega NP  6/3/2019, 12:06 PM

## 2019-06-03 NOTE — PROGRESS NOTES
Pharmacy Monitoring Warfarin    Indication:   Atrial Fibrillation  INR Goal:  2 - 3 (unless specified)  DDIs:  Drugs that may increase INR: None  Drugs that may decrease INR: None  Other current anticoagulants/ drugs that may increase bleeding risk: None    Recent Labs     06/03/19  1353 06/03/19  0510 05/30/19  1343 05/27/19  0200  05/20/19  0410   HGB  --  10.9*  --  11.1*  --  10.7*   INR 1.9*  --  2.0* 2.2*   < > 2.4*    < > = values in this interval not displayed.        Daily PT/INR order in place?: YES (Mon/Thu)    Daily dose ordered: 2 mg PO daily    Plan:   - INR trending down - consider increasing dose to 2.5 mg PO daily    Thanks,  STEPHON Byrnes

## 2019-06-03 NOTE — PROGRESS NOTES
conducted a Follow up consultation and Spiritual Assessment for Rigo Mora, who is a 80 y. o.,male. Patients Primary Language is: [place]. According to the patients EMR Yarsanism Affiliation is: Sikhism. The  provided the following Interventions:  Continued the relationship of care and support. Patient was asleep but easily awakened. Listened empathically to patient. He has no concerns; he calls when he needs to get up. Also, previous to admission, he was walking unassisted. Offered [employer], prayer, and assurance of continued prayer for patient. Chart reviewed. The following outcomes were achieved:  Patient expressed gratitude for pastoral care visit. Assessment:  There are no further spiritual or Orthodoxy issues which require Spiritual Care Services interventions at this time. Plan:  Chaplains will continue to follow and provide pastoral care as needed or requested.  recommends bedside caregivers page  on duty if patient shows signs of acute spiritual or emotional distress. The Rev.  R Sherrill Joya [phone] [facility] [phone] / [facility] [phone]

## 2019-06-03 NOTE — PROGRESS NOTES
Nutrition follow-up/  Plan of care TCC      RECOMMENDATIONS:     1. Diabetic Consistent Carb Diet  2. Monitor weight, labs and PO intake  3. RD to follow     GOALS:     1. Ongoing: PO intake meets >75% of protein/calorie needs by 6/10  2. Ongoing: Weight Maintenance (+/- 1-2 lb) by 6/10    ASSESSMENT:     Weight status is classified as normal per Body mass index is 19.76 kg/m². However, Pt at nutrition risk d/t BMI <23 and age >65 years. PO intake is fair. Labs noted. BG range (117-156) over the past 24 hours; A1c (6.8). Pt w/ elevated BUN. Nutrition recommendations listed. RD to follow. Nutrition Risk:  [] High  [] Moderate [x]  Low    SUBJECTIVE/OBJECTIVE:   (6/3): Last BM on (6/2) per I/Os. Variability in weights recorded but noted 9 lb or 6.3% from admission weight and 4 lb or 2.9% x 1 week. Pt w/ variable but fair PO intake, but at baseline. Will continue to monitor. (5/28): Pt with baseline appetite and doing well. Last BM on (5/26) per I/Os. Weights have been more stable but with some variability. Noted a 4.8 lb or 3.3% loss from admission weight. Will continue to monitor.      (5/21): Variable but fair appetite. Pt seen in room OOB in chair at lunch with wife at bedside. Observed 55% of meal consumed. Last BM on (5/18) per I/Os and noted MOM given early this morning. No new weight on record as of yet. Will continue to monitor. (5/14): Pt has a variable appetite but fair overall. Observed 45% of a very light lunch consumed but had a good breakfast this morning. Last BM (5/14). Weight stable from last week but noted a 7 lb or 4.9% loss since admission weight 2 weeks ago. Continue to encourage intake and will monitor.     (5/7): Current weight on record showing a 6 lb or 4.2% loss since admission x 1 week ago. Noted stable weight 140-145 lb PTA. Last BM (5/5) and on bowel regimen. Pt seen in room later in the day. Stated his appetite/PO intake has been normal, but he has never been a big eater. Per RN Pt has been consuming ~50% of meals. Encouraged intake and will continue to follow. (4/30): Transferred from [address] on 4/29. Admitted s/p fall with right hip fracture. S/P right hip percutaneous pinning on 4/26. Has history of Alzheimer's disease, diabetes, HTN and a-fib. Patient reports having a good appetite and weight was stable at 145-150 lb PTA. Observed 75% intake of breakfast meal during visit. Denies food allergies and problems with chewing/swallowing. Will monitor. Information Obtained from:    [x] Chart Review   [x] Patient   [] Family/Caregiver   [x] Nurse/Physician   [] Interdisciplinary Meeting/Rounds    Diet: Diabetic Consistent Carb diet  Medications: [x] Reviewed    Allergies: [x] Reviewed   Patient Active Problem List   Diagnosis Code    Hip fracture ([facility] S72.009A    Closed right hip fracture, initial encounter ([facility] S72.001A    Atrial fibrillation ([facility] I48.91    HTN (hypertension) I10     Past Medical History:   Diagnosis Date    Alzheimer's disease     Atrial fibrillation ([facility]     Diabetes ([facility]     Hypertension       Labs:    Lab Results   Component Value Date/Time    Sodium 138 06/03/2019 05:10 AM    Potassium 4.6 06/03/2019 05:10 AM    Chloride 104 06/03/2019 05:10 AM    CO2 26 06/03/2019 05:10 AM    Anion gap 8 06/03/2019 05:10 AM    Glucose 119 (H) 06/03/2019 05:10 AM    BUN 20 (H) 06/03/2019 05:10 AM    Creatinine 0.76 06/03/2019 05:10 AM    Calcium 8.4 (L) 06/03/2019 05:10 AM     Anthropometrics: BMI (calculated): 19.7  Last 3 Recorded Weights in this Encounter    05/21/19 1521 05/28/19 1703 06/03/19 1038   Weight: 61.2 kg (135 lb) 62.2 kg (137 lb 3.2 oz) 60.7 kg (133 lb 12.8 oz)      Ht Readings from Last 1 Encounters:   04/29/19 5' 9\" (1.753 m)     Weight Metrics 6/3/2019 4/26/2019   Weight 133 lb 12.8 oz 140 lb   BMI 19.76 kg/m2 22.6 kg/m2     No data found.     UBW: 145-150 lb  [x] Weight Loss (9 lb or 6.3% from admission weight and 4 lb or 2.9% x 1 week)  [] Weight Gain  [x] Weight Stable PTA per Pt    Estimated Nutrition Needs: [x] MSJ    Calories: 1700 Kcal Based on:   [x] Actual BW    Protein:   70-84 g Based on:   [x] Actual BW    Fluid:       8791-5360 ml Based on:   [x] Actual BW      Nutrition Problems Identified:   [x] Suboptimal PO intake (variable but fair)  [] Food Allergies  [] Difficulty chewing/swallowing/poor dentition  [] Constipation/Diarrhea (Last BM on 6/2; bowel regimen in place)  [] Nausea/Vomiting   [] None  [] Other:     Plan:   [x] Therapeutic Diet  [x]  Obtained/adjusted food preferences/tolerances and/or snacks options   []  Supplements added   [] Occupational therapy following for feeding techniques  []  HS snack added   []  Modify diet texture   []  Modify diet for food allergies   []  Educate patient   []  Assist with menu selection   [x]  Monitor PO intake on meal rounds   [x]  Continue inpatient monitoring and intervention   [x]  Participated in discharge planning/Interdisciplinary rounds/Team meetings   []  Other:     Education Needs:   [] Not appropriate for teaching at this time due to:   [x] Identified and addressed    Nutrition Monitoring and Evaluation:  [x] Continue ongoing monitoring and intervention  [] Chayito Rodas

## 2019-06-04 LAB
GLUCOSE BLD STRIP.AUTO-MCNC: 119 MG/DL (ref 70–110)
GLUCOSE BLD STRIP.AUTO-MCNC: 128 MG/DL (ref 70–110)
GLUCOSE BLD STRIP.AUTO-MCNC: 183 MG/DL (ref 70–110)

## 2019-06-04 PROCEDURE — 74011250637 HC RX REV CODE- 250/637: Performed by: NURSE PRACTITIONER

## 2019-06-04 PROCEDURE — 74011250637 HC RX REV CODE- 250/637: Performed by: INTERNAL MEDICINE

## 2019-06-04 PROCEDURE — 82962 GLUCOSE BLOOD TEST: CPT

## 2019-06-04 PROCEDURE — 74011636637 HC RX REV CODE- 636/637: Performed by: INTERNAL MEDICINE

## 2019-06-04 RX ADMIN — WARFARIN SODIUM 2 MG: 2 TABLET ORAL at 17:36

## 2019-06-04 RX ADMIN — PANTOPRAZOLE SODIUM 40 MG: 40 TABLET, DELAYED RELEASE ORAL at 09:36

## 2019-06-04 RX ADMIN — SIMVASTATIN 20 MG: 20 TABLET, FILM COATED ORAL at 21:13

## 2019-06-04 RX ADMIN — DOCUSATE SODIUM 100 MG: 100 CAPSULE, LIQUID FILLED ORAL at 09:36

## 2019-06-04 RX ADMIN — METFORMIN HYDROCHLORIDE 1000 MG: 500 TABLET ORAL at 17:36

## 2019-06-04 RX ADMIN — METFORMIN HYDROCHLORIDE 1000 MG: 500 TABLET ORAL at 09:36

## 2019-06-04 RX ADMIN — INSULIN LISPRO 2 UNITS: 100 INJECTION, SOLUTION INTRAVENOUS; SUBCUTANEOUS at 12:06

## 2019-06-04 RX ADMIN — POLYETHYLENE GLYCOL 3350 17 G: 17 POWDER, FOR SOLUTION ORAL at 09:35

## 2019-06-04 RX ADMIN — CHOLECALCIFEROL (VITAMIN D3) 25 MCG (1,000 UNIT) TABLET 1000 UNITS: at 09:36

## 2019-06-04 RX ADMIN — LISINOPRIL 10 MG: 10 TABLET ORAL at 09:36

## 2019-06-04 NOTE — ROUTINE PROCESS
Bedside and Verbal shift change report given to Suad (oncoming nurse) by Viola Preciado RN (offgoing nurse). Report included the following information SBAR, Kardex and MAR.

## 2019-06-04 NOTE — ROUTINE PROCESS
Ambulates with minimal assist to bathroom with walker, needs cues to use walker .  Frequently attempts ambulation without walker

## 2019-06-04 NOTE — ROUTINE PROCESS
Bedside and Verbal shift change report given to [place] lpn (oncoming nurse) by yumiko jimenez (offgoing nurse). Report included the following information Kardex, MAR and Recent Results.

## 2019-06-05 LAB
GLUCOSE BLD STRIP.AUTO-MCNC: 122 MG/DL (ref 70–110)
GLUCOSE BLD STRIP.AUTO-MCNC: 123 MG/DL (ref 70–110)
GLUCOSE BLD STRIP.AUTO-MCNC: 142 MG/DL (ref 70–110)
GLUCOSE BLD STRIP.AUTO-MCNC: 149 MG/DL (ref 70–110)
GLUCOSE BLD STRIP.AUTO-MCNC: 166 MG/DL (ref 70–110)

## 2019-06-05 PROCEDURE — 74011636637 HC RX REV CODE- 636/637: Performed by: INTERNAL MEDICINE

## 2019-06-05 PROCEDURE — 74011250637 HC RX REV CODE- 250/637: Performed by: INTERNAL MEDICINE

## 2019-06-05 PROCEDURE — 74011250637 HC RX REV CODE- 250/637: Performed by: NURSE PRACTITIONER

## 2019-06-05 RX ORDER — WARFARIN 3 MG/1
3 TABLET ORAL EVERY EVENING
Status: DISCONTINUED | OUTPATIENT
Start: 2019-06-05 | End: 2019-06-08 | Stop reason: HOSPADM

## 2019-06-05 RX ADMIN — Medication 1000 MG: at 09:00

## 2019-06-05 RX ADMIN — SIMVASTATIN 20 MG: 20 TABLET, FILM COATED ORAL at 21:11

## 2019-06-05 RX ADMIN — LISINOPRIL 10 MG: 10 TABLET ORAL at 08:10

## 2019-06-05 RX ADMIN — DOCUSATE SODIUM 100 MG: 100 CAPSULE, LIQUID FILLED ORAL at 08:10

## 2019-06-05 RX ADMIN — POLYETHYLENE GLYCOL 3350 17 G: 17 POWDER, FOR SOLUTION ORAL at 08:10

## 2019-06-05 RX ADMIN — PANTOPRAZOLE SODIUM 40 MG: 40 TABLET, DELAYED RELEASE ORAL at 08:10

## 2019-06-05 RX ADMIN — INSULIN LISPRO 2 UNITS: 100 INJECTION, SOLUTION INTRAVENOUS; SUBCUTANEOUS at 21:11

## 2019-06-05 RX ADMIN — METFORMIN HYDROCHLORIDE 1000 MG: 500 TABLET ORAL at 17:21

## 2019-06-05 RX ADMIN — METFORMIN HYDROCHLORIDE 1000 MG: 500 TABLET ORAL at 08:10

## 2019-06-05 RX ADMIN — CHOLECALCIFEROL (VITAMIN D3) 25 MCG (1,000 UNIT) TABLET 1000 UNITS: at 08:10

## 2019-06-05 RX ADMIN — WARFARIN SODIUM 3 MG: 3 TABLET ORAL at 17:22

## 2019-06-05 NOTE — ROUTINE PROCESS
Bedside and Verbal shift change report given to [id number] (oncoming nurse) by Librado Boone RN (offgoing nurse). Report included the following information SBAR, Kardex and MAR.

## 2019-06-05 NOTE — INTERDISCIPLINARY ROUNDS
Mr Shadi Aguilar is stable to discharge home. Plan is LCD for 6/6/2019 and discharge on 6/7/2019. Enxert[address] 30 to be delivered today. Will continue to follow.

## 2019-06-05 NOTE — PROGRESS NOTES
[address] [facility]   PHYSICAL THERAPY DAILY TREATMENT NOTE      Patient: Terence Loera (26 y.o. male)               Date: 6/5/2019    Physician: Octavio Bishop MD  Primary Diagnosis: rt hip fracture          Treatment Diagnosis  Treatment Diagnosis: need for assist with self care  Treatment Diagnosis 2: muscle weakness  Precautions: PWB(50% thru RLE)  Vital Signs  Vital Signs  Temp: 97.1 °F (36.2 °C)  Temp Source: Oral  Resp Rate: 18  O2 Sat (%): 96 %  BP: 124/62  MAP (Calculated): 67     Cognitive Status:  Mental Status  Neurologic State: Alert  Orientation Level: Oriented to person;Oriented to place  Cognition: Follows commands; Impulsive  Pain  Pain Screen  Pain Scale 1: Numeric (0 - 10)  Pain Intensity 1: 0  Patient Stated Pain Goal: 0  Pain Reassessment 1: Patient resting w/respiratory rate greater than 10  Bed Mobility Training  Bed Mobility Training  Rolling: Modified independent  Supine to Sit: Modified independent  Level of Assistance: Modified independent  Interventions: Other (comment)(Bed rail)  Balance  Sitting: Intact  Sitting - Static: Good (unsupported)  Sitting - Dynamic: Good (unsupported)  Standing: With support  Standing - Static: Good  Standing - Dynamic : Fair  Transfer Training  Transfer Training  Sit to Stand: Modified independent  Stand to Sit: Modified independent  Sit to Stand: Modified independent  Gait Training  Gait  Base of Support: Center of gravity altered  Speed/Tamanna: Slow  Gait Abnormalities: Decreased step clearance  Ambulation - Level of Assistance: Supervision  Distance (ft): 240 Feet (ft)  Assistive Device: Gait belt;Walker, rolling  Stairs - Level of Assistance: Supervision  Number of Stairs Trained: 5  Interventions: Verbal cues; Visual/Demos  Gait Description (WDL): Within defined limits  Gait Abnormalities: Decreased step clearance  Right Side Weight Bearing: Full         With 4 turns.   Treatment:    Pt. Participated in Bed Mobility of I/MOD I with use of bed rail, Functional transfers with BRANDO and ambulation with use of FWW for 240 feet with S/Dist S and was able to ascend and descend 5 steps in prep for return home and home entrance. Patient/Caregiver Education:   Pt /Caregiver Education on safety and fall prevention, instruction in stair climbing  was provided to pt. Who demonstrated good tech with verbal and visual cues. ASSESSMENT:  Patient continues to benefit from Skilled PT services to improve Strengthening, Flexibility, Balance and Endurance. Progression toward goals:  ?      Improving appropriately and progressing toward goals  ? Improving slowly and progressing toward goals  ? Not making progress toward goals and plan of care will be adjusted     Treatment session: 54 minutes.   Therapist:   Toya Faith PTA,          6/5/2019

## 2019-06-05 NOTE — PROGRESS NOTES
Met with Mrs. Anne to review discharge needs. She has all needed medical equipment and her son resides with them but not sure for how long. Preference for [facility] is [facility] as he has used them in the past. Referral sent and pending update on accepting. En[address] reviewed, signed and copy provided. Mr. Waite Farrah to discharge home on Saturday with [facility]. Will continue to follow.

## 2019-06-06 ENCOUNTER — PATIENT OUTREACH (OUTPATIENT)
Dept: CASE MANAGEMENT | Age: 84
End: 2019-06-06

## 2019-06-06 LAB
GLUCOSE BLD STRIP.AUTO-MCNC: 105 MG/DL (ref 70–110)
GLUCOSE BLD STRIP.AUTO-MCNC: 128 MG/DL (ref 70–110)
GLUCOSE BLD STRIP.AUTO-MCNC: 130 MG/DL (ref 70–110)
GLUCOSE BLD STRIP.AUTO-MCNC: 142 MG/DL (ref 70–110)
GLUCOSE BLD STRIP.AUTO-MCNC: 207 MG/DL (ref 70–110)
INR PPP: 2.3 (ref 0.8–1.2)
PROTHROMBIN TIME: 25.1 SEC (ref 11.5–15.2)

## 2019-06-06 PROCEDURE — 36415 COLL VENOUS BLD VENIPUNCTURE: CPT

## 2019-06-06 PROCEDURE — 74011250637 HC RX REV CODE- 250/637: Performed by: INTERNAL MEDICINE

## 2019-06-06 PROCEDURE — 74011250637 HC RX REV CODE- 250/637: Performed by: NURSE PRACTITIONER

## 2019-06-06 PROCEDURE — 82962 GLUCOSE BLOOD TEST: CPT

## 2019-06-06 PROCEDURE — 74011636637 HC RX REV CODE- 636/637: Performed by: INTERNAL MEDICINE

## 2019-06-06 PROCEDURE — 85610 PROTHROMBIN TIME: CPT

## 2019-06-06 RX ADMIN — INSULIN LISPRO 4 UNITS: 100 INJECTION, SOLUTION INTRAVENOUS; SUBCUTANEOUS at 12:54

## 2019-06-06 RX ADMIN — DOCUSATE SODIUM 100 MG: 100 CAPSULE, LIQUID FILLED ORAL at 10:10

## 2019-06-06 RX ADMIN — METFORMIN HYDROCHLORIDE 1000 MG: 500 TABLET ORAL at 18:28

## 2019-06-06 RX ADMIN — Medication 1000 MG: at 09:00

## 2019-06-06 RX ADMIN — POLYETHYLENE GLYCOL 3350 17 G: 17 POWDER, FOR SOLUTION ORAL at 10:09

## 2019-06-06 RX ADMIN — WARFARIN SODIUM 3 MG: 3 TABLET ORAL at 18:28

## 2019-06-06 RX ADMIN — METFORMIN HYDROCHLORIDE 1000 MG: 500 TABLET ORAL at 10:10

## 2019-06-06 RX ADMIN — PANTOPRAZOLE SODIUM 40 MG: 40 TABLET, DELAYED RELEASE ORAL at 10:10

## 2019-06-06 RX ADMIN — SIMVASTATIN 20 MG: 20 TABLET, FILM COATED ORAL at 22:08

## 2019-06-06 RX ADMIN — LISINOPRIL 10 MG: 10 TABLET ORAL at 10:10

## 2019-06-06 RX ADMIN — CHOLECALCIFEROL (VITAMIN D3) 25 MCG (1,000 UNIT) TABLET 1000 UNITS: at 10:10

## 2019-06-06 NOTE — PROGRESS NOTES
Alma Stephens and I had a conference call with patients son Jenelle Simpson, he has discussed with [facility] and [facility] and they have decided to transition Ms. Lemos to [facility]. Code status is to be updated to DNR, confirmed with [facility] she will sign form when she arives this evening. Will continue to follow and provide support as needed.

## 2019-06-06 NOTE — ROUTINE PROCESS
Bedside and Verbal shift change report given to Lashae damon (oncoming nurse) by Darrell Vazquez LPN (offgoing nurse). Report included the following information Kardex, MAR and Recent Results.

## 2019-06-06 NOTE — PROGRESS NOTES
Community Care Team Documentation for Patient in [facility]     Patient discharged from [facility] 4/26/2019 - 4/29/2019 to [facility], [address], on 4/29/2019. Hospital Discharge diagnosis:  Hip fracture. SNF Attending Provider:     Anticipated discharge date from SNF:       PCP : Viktoria Bowen MD    Nurse Navigator:     [facility] rounds completed, updates provided by facility. Low Risk            5       Total Score        3 Has Seen PCP in Last 6 Months (Yes=3, No=0)    2 . Living with Significant Other. Assisted Living. LTAC. SNF. or   Rehab        Criteria that do not apply:    Patient Length of Stay (>5 days = 3)    IP Visits Last 12 Months (1-3=4, 4=9, >4=11)    Pt.  Coverage (Medicare=5 , Medicaid, or Self-Pay=4)    Charlson Comorbidity Score (Age + Comorbid Conditions)      Active Ambulatory Problems     Diagnosis Date Noted    Hip fracture ([address] 04/26/2019    Closed right hip fracture, initial encounter ([address] 04/26/2019    Atrial fibrillation ([address] 04/26/2019    HTN (hypertension) 04/26/2019     Resolved Ambulatory Problems     Diagnosis Date Noted    No Resolved Ambulatory Problems     Past Medical History:   Diagnosis Date    Alzheimer's disease     Atrial fibrillation ([address]     Diabetes ([address]     Hypertension

## 2019-06-06 NOTE — ROUTINE PROCESS
Bedside and Verbal shift change report given to BENIGNO Lpoes LPN (oncoming nurse) by Ashlyn Pereira RN (offgoing nurse). Report included the following information SBAR, Kardex and Med Rec Status. QH visual checks and 24h chart checks done.

## 2019-06-07 LAB
ATRIAL RATE: 416 BPM
CALCULATED R AXIS, ECG10: 10 DEGREES
CALCULATED T AXIS, ECG11: 43 DEGREES
DIAGNOSIS, 93000: NORMAL
GLUCOSE BLD STRIP.AUTO-MCNC: 116 MG/DL (ref 70–110)
GLUCOSE BLD STRIP.AUTO-MCNC: 125 MG/DL (ref 70–110)
GLUCOSE BLD STRIP.AUTO-MCNC: 130 MG/DL (ref 70–110)
GLUCOSE BLD STRIP.AUTO-MCNC: 159 MG/DL (ref 70–110)
INR PPP: 2.6 (ref 0.8–1.2)
PROTHROMBIN TIME: 28.1 SEC (ref 11.5–15.2)
Q-T INTERVAL, ECG07: 434 MS
QRS DURATION, ECG06: 98 MS
QTC CALCULATION (BEZET), ECG08: 392 MS
VENTRICULAR RATE, ECG03: 49 BPM

## 2019-06-07 PROCEDURE — 74011250637 HC RX REV CODE- 250/637: Performed by: INTERNAL MEDICINE

## 2019-06-07 PROCEDURE — 36415 COLL VENOUS BLD VENIPUNCTURE: CPT

## 2019-06-07 PROCEDURE — 82962 GLUCOSE BLOOD TEST: CPT

## 2019-06-07 PROCEDURE — 93005 ELECTROCARDIOGRAM TRACING: CPT

## 2019-06-07 PROCEDURE — 74011636637 HC RX REV CODE- 636/637: Performed by: INTERNAL MEDICINE

## 2019-06-07 PROCEDURE — 74011250637 HC RX REV CODE- 250/637: Performed by: NURSE PRACTITIONER

## 2019-06-07 PROCEDURE — 85610 PROTHROMBIN TIME: CPT

## 2019-06-07 RX ORDER — METFORMIN HYDROCHLORIDE 1000 MG/1
1000 TABLET ORAL 2 TIMES DAILY WITH MEALS
Qty: 60 TAB | Refills: 0 | Status: SHIPPED | OUTPATIENT
Start: 2019-06-07

## 2019-06-07 RX ORDER — LISINOPRIL 10 MG/1
10 TABLET ORAL DAILY
Qty: 30 TAB | Refills: 0 | Status: SHIPPED | OUTPATIENT
Start: 2019-06-08

## 2019-06-07 RX ORDER — OXYCODONE AND ACETAMINOPHEN 5; 325 MG/1; MG/1
1 TABLET ORAL
Qty: 12 TAB | Refills: 0 | Status: SHIPPED | OUTPATIENT
Start: 2019-06-07 | End: 2019-06-21

## 2019-06-07 RX ORDER — WARFARIN 2 MG/1
2 TABLET ORAL DAILY
Qty: 30 TAB | Refills: 0 | Status: SHIPPED | OUTPATIENT
Start: 2019-06-07

## 2019-06-07 RX ORDER — DOCUSATE SODIUM 100 MG/1
100 CAPSULE, LIQUID FILLED ORAL DAILY
Qty: 30 CAP | Refills: 2 | Status: SHIPPED | OUTPATIENT
Start: 2019-06-08 | End: 2019-09-06

## 2019-06-07 RX ORDER — POLYETHYLENE GLYCOL 3350 17 G/17G
17 POWDER, FOR SOLUTION ORAL DAILY
Qty: 30 PACKET | Refills: 0 | Status: SHIPPED | OUTPATIENT
Start: 2019-06-08

## 2019-06-07 RX ADMIN — METFORMIN HYDROCHLORIDE 1000 MG: 500 TABLET ORAL at 08:05

## 2019-06-07 RX ADMIN — METFORMIN HYDROCHLORIDE 1000 MG: 500 TABLET ORAL at 17:27

## 2019-06-07 RX ADMIN — DOCUSATE SODIUM 100 MG: 100 CAPSULE, LIQUID FILLED ORAL at 08:05

## 2019-06-07 RX ADMIN — WARFARIN SODIUM 3 MG: 3 TABLET ORAL at 17:27

## 2019-06-07 RX ADMIN — PANTOPRAZOLE SODIUM 40 MG: 40 TABLET, DELAYED RELEASE ORAL at 08:05

## 2019-06-07 RX ADMIN — LISINOPRIL 10 MG: 10 TABLET ORAL at 08:05

## 2019-06-07 RX ADMIN — CHOLECALCIFEROL (VITAMIN D3) 25 MCG (1,000 UNIT) TABLET 1000 UNITS: at 08:05

## 2019-06-07 RX ADMIN — INSULIN LISPRO 2 UNITS: 100 INJECTION, SOLUTION INTRAVENOUS; SUBCUTANEOUS at 17:28

## 2019-06-07 RX ADMIN — POLYETHYLENE GLYCOL 3350 17 G: 17 POWDER, FOR SOLUTION ORAL at 08:05

## 2019-06-07 RX ADMIN — SIMVASTATIN 20 MG: 20 TABLET, FILM COATED ORAL at 21:49

## 2019-06-07 NOTE — PROGRESS NOTES
Received call from Jose Greenfield with [facility] and Mr. Ashford is not eligible for a walker as he received one on 6/8/2016. There has to be 5 years in order to be eligible for new equipment.

## 2019-06-07 NOTE — PROGRESS NOTES
Pharmacy Monitoring Warfarin    Indication:   Atrial Fibrillation  INR Goal:  2 - 3 (unless specified)  DDIs:  Drugs that may increase INR: None  Drugs that may decrease INR: None  Other current anticoagulants/ drugs that may increase bleeding risk: None    Recent Labs     06/07/19  0100 06/06/19  0510 06/03/19  1353 06/03/19  0510  05/27/19  0200  05/20/19  0410   HGB  --   --   --  10.9*  --  11.1*  --  10.7*   INR 2.6* 2.3* 1.9*  --    < > 2.2*   < > 2.4*    < > = values in this interval not displayed. Daily PT/INR order in place?: YES (Mon/Thu)    Daily dose ordered: 3 mg PO daily (started 6/5)  - 2 mg PO daily    Recommendation:   - INR increased significantly from 6/5 to 6/7 after 2 doses of warfarin 3 mg  - Consider lowering to 2.5 mg       Thanks.   Jaguar Ramírez, PHARMD

## 2019-06-07 NOTE — PROGRESS NOTES
Problem: Mobility Impaired (Adult and Pediatric)  Goal: *Acute Goals and Plan of Care (Insert Text)  Description  PHYSICAL THERAPY LTG GOALS :  Initiated 4/30/2019 and to be accomplished within 4 Weeks (updated 6/7/19)    1. Patient will move from supine to sit and sit to supine  and roll side to side in bed with modified independence. (Achieved)  2. Patient will transfer from bed to chair and chair to bed with supervision/set-up using RW with WB compliance. (Achieved)  3. Patient will perform sit to stand with supervision/set-up with Good balance and safety awareness. (Achieved)  4. Patient will ambulate with supervision/set-up for 200 feet with RW on level surfaces and be able to maneuver through narrow spaces and obstacles without loss of balance with WB compliance. (Achieved)  5. Patient will ascend/descend 3 stairs with NO handrail(s) with stand by assistance to allow for safe home access/exit. (Achieved)  6. Patient will improve standardized test score for [place] Standing Balance Scale 4/5 to reduce fall risk. (Achieved)    PHYSICAL THERAPY LTG GOALS :  Initiated 4/30/2019 and to be accomplished within 4 Weeks (updated 6/3/19)    1. Patient will move from supine to sit and sit to supine  and roll side to side in bed with modified independence. (Achieved)  2. Patient will transfer from bed to chair and chair to bed with supervision/set-up using RW with WB compliance. (Achieved)  3. Patient will perform sit to stand with supervision/set-up with Good balance and safety awareness. (Achieved)  4. Patient will ambulate with supervision/set-up for 200 feet with RW on level surfaces and be able to maneuver through narrow spaces and obstacles without loss of balance with WB compliance. (Progressing; at SBA/close supervision)  5. Patient will ascend/descend 3 stairs with NO handrail(s) with stand by assistance to allow for safe home access/exit. (Achieved)  6.   Patient will improve standardized test score for [place] Standing Balance Scale 4/5 to reduce fall risk. (Achieved)    PHYSICAL THERAPY LTG GOALS :  Initiated 4/30/2019 and to be accomplished within 4 Weeks (updated 5/27/19)    1. Patient will move from supine to sit and sit to supine  and roll side to side in bed with modified independence.   (progressing; SBA)  2.  Patient will transfer from bed to chair and chair to bed with supervision/set-up using RW with WB compliance. (Maintaining at SBA)  3. Patient will perform sit to stand with supervision/set-up with Good balance and safety awareness. (Maintaining at SBA)  4. Patient will ambulate with supervision/set-up for 200 feet with RW on level surfaces and be able to maneuver through narrow spaces and obstacles without loss of balance with WB compliance. (Maintaining at SBA)  5. Patient will ascend/descend 3 stairs with NO handrail(s) with stand by assistance to allow for safe home access/exit. (Maintaining at; 3 stairs with BHR with CGA)  6. Patient will improve standardized test score for [place] Standing Balance Scale 4/5 to reduce fall risk. (Maintaining at; 3/5)     PHYSICAL THERAPY LTG GOALS :  Initiated 4/30/2019 and to be accomplished within 4 Weeks (updated 5/21/19)    1. Patient will move from supine to sit and sit to supine  and roll side to side in bed with modified independence.   (progressing; CGA/min A for RLE management)  2. Patient will transfer from bed to chair and chair to bed with supervision/set-up using RW with WB compliance. (Progressing; SBA)  3. Patient will perform sit to stand with supervision/set-up with Good balance and safety awareness. (Progresing; SBA)  4. Patient will ambulate with supervision/set-up for 200 feet with RW on level surfaces and be able to maneuver through narrow spaces and obstacles without loss of balance with WB compliance. (Progressing; SBA)  5.   Patient will ascend/descend 3 stairs with NO handrail(s) with stand by assistance to allow for safe home access/exit. (Progresing; 3 stairs with BHR with CGA)  6. Patient will improve standardized test score for [place] Standing Balance Scale 4/5 to reduce fall risk. (Progresing; 3/5)     PHYSICAL THERAPY STG GOALS :  Initiated 4/30/2019 and to be accomplished within 2 Weeks (Updated 5/21/19)    1. Patient will move from supine to sit and sit to supine  and roll side to side in bed with stand by assistance. (Progressing; CGA/Min A for RLE management)    2. Patient will transfer from bed to chair and chair to bed with contact guard assist using RW with WB compliance. (Achieved)  3. Patient will perform sit to stand with contact guard assist with Fair+ balance and safety awareness with WB compliance. (Achieved)   4. Patient will ambulate with contact guard assist for 75 feet with RW on level surfaces with 2 turns with WB compliance. (Achieved)   5. Patient will ascend/descend 1 step with bilateral handrail(s) with minimal assistance to allow for safe home access/exit with WB compliance. (Achieved with cueing for TTWB compliance, inside // bars)  6. Patient will improve standardized test score for [place] Standing Balance Scale 3/5 to reduce fall risk. (Achieved)    PHYSICAL THERAPY STG GOALS :  Initiated 4/30/2019 and to be accomplished within 2 Weeks (Updated 5/14/19)    1. Patient will move from supine to sit and sit to supine  and roll side to side in bed with stand by assistance. (Progressing; Min A for RLE management)    2. Patient will transfer from bed to chair and chair to bed with contact guard assist using RW with WB compliance. (Achieved)  3. Patient will perform sit to stand with contact guard assist with Fair+ balance and safety awareness with WB compliance. (Achieved)   4. Patient will ambulate with contact guard assist for 75 feet with RW on level surfaces with 2 turns with WB compliance. (Achieved)   5.   Patient will ascend/descend 1 step with bilateral handrail(s) with minimal assistance to allow for safe home access/exit with WB compliance. (Achieved with cueing for TTWB compliance, inside // bars)  6. Patient will improve standardized test score for [place] Standing Balance Scale 3/5 to reduce fall risk. (Maintained; 2+/5)    PHYSICAL THERAPY STG GOALS :  Initiated 4/30/2019 and to be accomplished within 2 Weeks (Updated 5/7/19)    1. Patient will move from supine to sit and sit to supine  and roll side to side in bed with stand by assistance. (Progressing; Min-Mod A)    2. Patient will transfer from bed to chair and chair to bed with contact guard assist using RW with WB compliance. (Achieved)  3. Patient will perform sit to stand with contact guard assist with Fair+ balance and safety awareness with WB compliance. (Progressing; CGA-Min A)   4. Patient will ambulate with contact guard assist for 75 feet with RW on level surfaces with 2 turns with WB compliance. (Progressing; 100ft with RW and CGA, cues for TTWB and no 1 turn)   5. Patient will ascend/descend 1 step with bilateral handrail(s) with minimal assistance to allow for safe home access/exit with WB compliance. (Not assessed due to increased difficulty with TTWB during ambulation)  6. Patient will improve standardized test score for [place] Standing Balance Scale 3/5 to reduce fall risk. (Progressing; 2+/5)    PHYSICAL THERAPY STG GOALS :  Initiated 4/30/2019 and to be accomplished within 2 Weeks    1. Patient will move from supine to sit and sit to supine  and roll side to side in bed with stand by assistance. 2.  Patient will transfer from bed to chair and chair to bed with contact guard assist using RW with WB compliance. 3.  Patient will perform sit to stand with contact guard assist with Fair+ balance and safety awareness with WB compliance. 4.  Patient will ambulate with contact guard assist for 75 feet with RW on level surfaces with 2 turns with WB compliance.   5. Patient will ascend/descend 1 step with bilateral handrail(s) with minimal assistance to allow for safe home access/exit with WB compliance. 6.  Patient will improve standardized test score for [place] Standing Balance Scale 3/5 to reduce fall risk. PHYSICAL THERAPY LTG GOALS :  Initiated 2019 and to be accomplished within 4 Weeks    1. Patient will move from supine to sit and sit to supine  and roll side to side in bed with modified independence. 2.  Patient will transfer from bed to chair and chair to bed with supervision/set-up using RW with WB compliance. 3.  Patient will perform sit to stand with supervision/set-up with Good balance and safety awareness. 4.  Patient will ambulate with supervision/set-up for 200 feet with RW on level surfaces and be able to maneuver through narrow spaces and obstacles without loss of balance with WB compliance. 5.  Patient will ascend/descend 3 stairs with NO handrail(s) with stand by assistance to allow for safe home access/exit. 6.  Patient will improve standardized test score for [place] Standing Balance Scale 4/5 to reduce fall risk. Physical Therapist:   Bridgett Diallo, PT  on 2019 1419 by Eder Mccoy PT  Outcome: Resolved/Met  [address] [facility] PHYSICAL THERAPY DISCHARGE SUMMARY      Patient: Ivette Abarca (52 y.o. male)                  Date: 2019    Attending Physician: Daily Valdez MD  Primary Diagnosis: rt hip fracture       Treatment Diagnosis  Treatment Diagnosis: need for assist with self care  Treatment Diagnosis 2: muscle weakness         Precautions: PWB(50% thru RLE)   MEDICAL HISTORY:   Past Medical History:   Diagnosis Date    Alzheimer's disease     Atrial fibrillation ([facility] [address]     Diabetes ([facility] [address]     Hypertension    No past surgical history on file. Reason for Discharge: ? Goals Met   ? Met highest potential  ?    ? Progress ceased  ? Hospitalized  ? Other: Pt/family request for early D/C from facility. Discharge Location:  ? Private Residence  ? long term  ? LTC  ? [facility] ?Other: 24 hr.supervision for safety. Summarize skilled services provided and significant progress attained since last daily/weekly note (include individualized treatment techniques and standardized tests): This Patient has received skilled PT services from 4/30/19  through 6/7/19 and has made Good progress towards their PT goals. Improvements noted in bed mobility, transfers, ambulation, balance, stair negotiation.    Summary of education/recommendation provided to Patient/Caregivers:    Pt. Education provided to use [employer]      Objective Data:     INITIAL  ASSESSMENT DISCHARGE ASSESSMENT   COGNITIVE STATUS COGNITIVE STATUS   Neurologic State: Alert  Orientation Level: Oriented X4(forgetful at times)  Cognition: Follows commands  Perception: Appears intact  Perseveration: No perseveration noted  Safety/Judgement: Fall prevention Neurologic State: Alert  Orientation Level: Oriented to person, Oriented to place, Oriented to situation  Cognition: Impulsive  Perception: Appears intact  Perseveration: No perseveration noted  Safety/Judgement: Fall prevention   PAIN PAIN   Pain Scale 1: Numeric (0 - 10)  Pain Intensity 1: 4  Pain Onset 1: acute  Pain Location 1: Hip  Pain Orientation 1: Right  Pain Description 1: Aching  Pain Intervention(s) 1: Medication (see MAR)  Patient Stated Pain Goal: 0  Pain Reassessment 1: Yes Pain Scale 1: Numeric (0 - 10)  Pain Intensity 1: 0  Pain Onset 1: movement  Pain Location 1: Hip  Pain Orientation 1: Right  Pain Description 1: Aching  Pain Intervention(s) 1: Medication (see MAR), Repositioned, Rest  Patient Stated Pain Goal: 0  Pain Reassessment 1: Patient resting w/respiratory rate greater than 10   GROSS ASSESSMENT GROSS ASSESSMENT   AROM: Generally decreased, functional  PROM: Generally decreased, functional  Strength: Generally decreased, functional(R hip 2-5, R knee 4+/5; L hip 4-/5, L knee 4+/5)  Coordination: Generally decreased, functional  Tone: Normal  Sensation: Intact(BUEs) AROM: Within functional limits  PROM: Generally decreased, functional  Strength: Within functional limits  Coordination: Within functional limits  Tone: Normal(BUEs)  Sensation: Intact(BUEs)   BED MOBILITY BED MOBILITY   Rolling: Moderate assistance, Assist x2  Supine to Sit: Moderate assistance(for RLE management)  Scooting: Minimum assistance Rolling: Modified independent  Supine to Sit: Modified independent  Scooting: Modified independent   GAIT GAIT   Base of Support: Center of gravity altered  Speed/Tamanna: Slow  Step Length: Right shortened, Left shortened(R > L)  Stance: Left decreased  Gait Abnormalities: Antalgic, Decreased step clearance  Ambulation - Level of Assistance: Minimal assistance  Distance (ft): 2 Feet (ft)  Assistive Device: Gait belt, Walker, rolling  Rail Use: Both  Stairs - Level of Assistance: Minimum assistance  Number of Stairs Trained: 3(4 inch)  Interventions: Tactile cues, Safety awareness training, Verbal cues Base of Support: Center of gravity altered  Speed/Tamanna: Pace decreased (<100 feet/min)  Step Length: Left shortened, Right shortened  Stance: Left decreased  Gait Abnormalities: Decreased step clearance  Ambulation - Level of Assistance: Supervision  Distance (ft): 240 Feet (ft)(x2)  Assistive Device: Walker, rolling  Rail Use: Both  Stairs - Level of Assistance: Supervision  Number of Stairs Trained: 5  Interventions: Verbal cues, Visual/Demos   Within defined limits Within defined limits   Toe touch Partial (%)(50% PWB)   Full   Full  With 5 turns. TRANSFERS TRANSFERS   Sit to Stand:  Moderate assistance  Stand to Sit: Moderate assistance  Bed to Chair: Minimum assistance Sit to Stand: Modified independent  Stand to Sit: Modified independent  Bed to Chair: Supervision   BALANCE BALANCE   Sitting: With support  Sitting - Static: Fair (occasional)  Sitting - Dynamic: Fair (occasional)  Standing: With support, Impaired  Standing - Static: Fair  Standing - Dynamic : Fair(-) Sitting: Intact  Sitting - Static: Good (unsupported)  Sitting - Dynamic: Good (unsupported)  Standing: With support  Standing - Static: Good  Standing - Dynamic : Fair(+)   WHEELCHAIR MOBILITY/MGMT WHEELCHAIR MOBILITY/MGMT          With 5 turns   Visual/Perceptual      Corrective Lenses: Reading glasses    Auditory:   Auditory  Auditory Impairment: Hard of hearing, left side         Visual/Perceptual      Corrective Lenses: Reading glasses    Auditory:   Auditory  Auditory Impairment: Hard of hearing, left side          Activity Tolerance:  Good                     Treatment:   Pt presented in bed, bed mobility with mod (I). Transfers with mod (I). Gait training 240 ft x 2 using RW with supervision, cueing for compliance with PWB 50% through RLE, pt mostly compliant but evident that pt exceeds PWB with fatigue. HEP handout administered to maintain gains upon return home, home health PT services are recommended. No additional questions at this time. Pt is d/c as he has achieved all goals. Discharge Recommendations:  ? Home Exercise Program     ?  Home Health PT   ? Remove throw rugs/clear environmental barriers    ? Assistance with: supervision with ambulation and stair negotiation    ? Ambulation Device: Rolling Walker   ? Steps with both    ? Wheelchair   ? Life Line/alert   ? WC  Ramp       Treatment minutes: 30 minutes.     Therapist :  Karla Solis, PT            Date:6/7/2019

## 2019-06-07 NOTE — ROUTINE PROCESS
Bedside and Verbal shift change report given to regino rn (oncoming nurse) by nancy rn (offgoing nurse). Report included the following information Kardex, MAR and Recent Results.

## 2019-06-07 NOTE — DISCHARGE SUMMARY
[facility]  Discharge Summary    Patient: Brian Jarrett MRN: [id number]  CSN: [id number]    YOB: 1931  Age: 80 y.o. Sex: male    DOA: 4/29/2019 LOS:  LOS: 39 days   Discharge Date:      Admission Diagnoses: rt hip fracture    Discharge Diagnoses:    Problem List as of 6/7/2019 Never Reviewed          Codes Class Noted - Resolved    Hip fracture ([facility] ICD-10-CM: S72.009A  ICD-9-CM: 820.8  4/26/2019 - Present        Closed right hip fracture, initial encounter ([facility] ICD-10-CM: S72.001A  ICD-9-CM: 820.8  4/26/2019 - Present        Atrial fibrillation ([facility] ICD-10-CM: I48.91  ICD-9-CM: 427.31  4/26/2019 - Present        HTN (hypertension) ICD-10-CM: I10  ICD-9-CM: 401.9  4/26/2019 - Present              Discharge Condition: Good    Discharge To: Home with [address] Course: Ms. Brianna Perez is a 80 yr old female who was recently hospitalized 4/26-4/29. Patient came in ER post mechanical fall at home. XRAY showed  Impacted subcapital right femoral neck fracture with moderately severe right hip joint osteoarthritis. Ortho saw patient and had right hip percutaneous pinning on 4/26 and tolerated. Patient had episode of bradycardia which resolved. Patient coumadin restarted with PT/INR monitoring. Patient improved and was sent to [facility] for rehab. I examined him today, he is sitting up in w/c, NAD. He is alert and verbal, denies any pain. Patient stay  At [facility] was uneventful. No report of  ER visit of hospitalization since admitted.  I spoke with patients son , he had concern about possible  hallucinations, probable acute delirium. UA and C&S done and was negative. During his stay, he had 2 episode of falls without injury. He had radiography studies done to right hip due to pain post fall, which was negative. HIS BS was monitor, and was stable on current meds. He remained on Coumadin for AFIB. He had  monitoring, her INR today is 2.6, patient to have outpatient pt/inr monitoring.  OK for discharge home tomorrow with family and to have home health eval for PT/OT, Skilled nursing for medication management and personal aid on 6/8/2019, if continues to be stable. Nursing charted his hr today at 48, I manually check his hr was 61, he denies any cardiac issues. I discussed with him to increase fluid intake.        PAST MEDICAL HX: Alzheimer's Disease, HTN AFIB, DM type 2      PAST SURGICAL Hx: hernia repair     SOCIAL Hx:      ALLERGIES: NKDA     ROSCONST: Fever, weight loss, fatigue or chills  HEENT: Recent changes in vision, vertigo  CV: Chest pain, palpitations, edema and varicosities  RESP: Cough, shortness of breath, wheezing  GI: Nausea, vomiting, abdominal pain, change in bowel habits,  : Hematuria, dysuria, frequency  MS: Weakness, right hip sorness  LYMPH/HEME: Anemia, bruising and history of blood transfusions  INTEG: Dermatitis, abnormal moles  NEURO: Dizziness, headache, fainting, seizures and stroke  PSYCH: Anxiety and depression        Objective:      Visit Vitals  /63   Pulse 59   Temp 97.6 °F (36.4 °C)   Resp 20   Ht 5' 9\" (1.753 m)   Wt 60.691 kg   SpO2 98   BMI 20.97 kg/m²         Physical Exam:  General appearance: alert, cooperative, no distress, appears stated age  HEENT: negative  Neck: supple, symmetrical, trachea midline, no adenopathy, thyroid: not enlarged, symmetric, no tenderness/mass/nodules, no carotid bruit and no JVD  Lungs: clear to auscultation bilaterally  Heart: regular rate and rhythm, S1, S2 normal, no murmur, click, rub or gallop  Abdomen: soft, non-tender.  Bowel sounds normal. No masses,  no organomegaly  Pulses: 2+ and symmetric  Skin: Skin color, texture, turgor normal.  Surgical site healed              Consults: None    Significant Diagnostic Studies: labs: see below    Discharge Medications:     Current Discharge Medication List      START taking these medications    Details   polyethylene glycol (MIRALAX) 17 gram packet Take 1 Packet by mouth daily.  Qty: 30 Packet, Refills: 0      docusate sodium (COLACE) 100 mg capsule Take 1 Cap by mouth daily for 90 days. Qty: 30 Cap, Refills: 2         CONTINUE these medications which have CHANGED    Details   lisinopril (PRINIVIL, ZESTRIL) 10 mg tablet Take 1 Tab by mouth daily. Qty: 30 Tab, Refills: 0      metFORMIN (GLUCOPHAGE) 1,000 mg tablet Take 1 Tab by mouth two (2) times daily (with meals). Qty: 60 Tab, Refills: 0      oxyCODONE-acetaminophen (PERCOCET) 5-325 mg per tablet Take 1 Tab by mouth every six (6) hours as needed for Pain for up to 14 days. Max Daily Amount: 4 Tabs. Qty: 12 Tab, Refills: 0    Associated Diagnoses: Closed fracture of right hip, initial encounter ([facility]      warfarin (COUMADIN) 2 mg tablet Take 1 Tab by mouth daily. Qty: 30 Tab, Refills: 0         CONTINUE these medications which have NOT CHANGED    Details   cholecalciferol (VITAMIN D3) 1,000 unit cap Take 1,000 Units by mouth daily. flaxseed oil 1,000 mg cap Take  by mouth.      glimepiride (AMARYL) 4 mg tablet Take 4 mg by mouth every morning. lovastatin (MEVACOR) 40 mg tablet Take 40 mg by mouth daily. raNITIdine (ZANTAC) 150 mg tablet Take 150 mg by mouth two (2) times a day. Results for Jenny Dumont (MRN [id number]) as of 6/7/2019 12:45   Ref.  Range 6/3/2019 05:10   WBC Latest Ref Range: 4.6 - 13.2 K/uL 6.0   RBC Latest Ref Range: 4.70 - 5.50 M/uL 3.94 (L)   HGB Latest Ref Range: 13.0 - 16.0 g/dL 10.9 (L)   HCT Latest Ref Range: 36.0 - 48.0 % 33.7 (L)   MCV Latest Ref Range: 74.0 - 97.0 FL 85.5   MCH Latest Ref Range: 24.0 - 34.0 PG 27.7   MCHC Latest Ref Range: 31.0 - 37.0 g/dL 32.3   RDW Latest Ref Range: 11.6 - 14.5 % 14.5   PLATELET Latest Ref Range: 135 - 420 K/uL 247   MPV Latest Ref Range: 9.2 - 11.8 FL 10.5   NEUTROPHILS Latest Ref Range: 40 - 73 % 58   LYMPHOCYTES Latest Ref Range: 21 - 52 % 27   MONOCYTES Latest Ref Range: 3 - 10 % 10   EOSINOPHILS Latest Ref Range: 0 - 5 % 4 BASOPHILS Latest Ref Range: 0 - 2 % 1   DF Latest Units:   AUTOMATED   ABS. NEUTROPHILS Latest Ref Range: 1.8 - 8.0 K/UL 3.4   ABS. LYMPHOCYTES Latest Ref Range: 0.9 - 3.6 K/UL 1.6   ABS. MONOCYTES Latest Ref Range: 0.05 - 1.2 K/UL 0.6   ABS. EOSINOPHILS Latest Ref Range: 0.0 - 0.4 K/UL 0.3   ABS. BASOPHILS Latest Ref Range: 0.0 - 0.1 K/UL 0.0       Results for Romario Jackson (MRN [id number]) as of 6/7/2019 12:45   Ref. Range 6/3/2019 05:10   Sodium Latest Ref Range: 136 - 145 mmol/L 138   Potassium Latest Ref Range: 3.5 - 5.5 mmol/L 4.6   Chloride Latest Ref Range: 100 - 108 mmol/L 104   CO2 Latest Ref Range: 21 - 32 mmol/L 26   Anion gap Latest Ref Range: 3.0 - 18 mmol/L 8   Glucose Latest Ref Range: 74 - 99 mg/dL 119 (H)   BUN Latest Ref Range: 7.0 - 18 MG/DL 20 (H)   Creatinine Latest Ref Range: 0.6 - 1.3 MG/DL 0.76   BUN/Creatinine ratio Latest Ref Range: 12 - 20   26 (H)   Calcium Latest Ref Range: 8.5 - 10.1 MG/DL 8.4 (L)   GFR est non-AA Latest Ref Range: >60 ml/min/1.73m2 >60   GFR est AA Latest Ref Range: >60 ml/min/1.73m2 >60       ASSESSMENT/PLANS  Closed right hip fracture: s/p right hip percutaneous pinning on 4/26.  Orthopedic surgery in 1 month     Constipated: resolved continue colace + Miralax daily, and prn dulcolax suppository      Fall : reported 2 episode since admitted, patient to follow up with Cardiologist in regards to his coumadin given his age, dementia, risk for fall. AFIB: continue Coumadin with PT/INR, next check on Monday 6/11    Mild dehydration encourage increase po fluid intake    DM type 2 , hgab1c 6.8 on 4/26, will continue metformin 1000 mg bid , renal function stable     HTN; BP stable on Lisinopril      Hyperlipidemia: continue Zocor      Continue PT/OT       Activity: Activity as tolerated  Diet: Diabetic Diet  Wound Care: None needed  Home Health foor PT/OT/Skilled nursing for medication management and Pt/INR monitoring.  Next check on Monday,   Follow-up: Cardiologist Md Ac, follow up with MD Lisa Wong on 6/14, MD Ena Dillon on 6/18/2019      face to face  I certified that medical conditions listed necessitate [address] fall risk  Generalized weakness difficulty to  right hip percutaneous pinning on 4/26. . My clinical finding support this patients is temporarily home bound because he requires assistance of another person to leave the home requires considerable and taxing effort. My clinical finding support the need for PT because he has decrease mobility due to recent  S/p ortho surgery      EKG reviewed showed incomplete right bundle branch is now present.  Patient denies issues, patient to follow up cardiologist for eval.     Time spent > 30 mins  Miryam Alvarez NP  6/7/2019, 12:57 PM

## 2019-06-07 NOTE — PROGRESS NOTES
Problem: Mobility Impaired (Adult and Pediatric)  Goal: *Acute Goals and Plan of Care (Insert Text)  Description  PHYSICAL THERAPY LTG GOALS :  Initiated 4/30/2019 and to be accomplished within 4 Weeks (updated 6/3/19)    1. Patient will move from supine to sit and sit to supine  and roll side to side in bed with modified independence. (Achieved)  2. Patient will transfer from bed to chair and chair to bed with supervision/set-up using RW with WB compliance. (Achieved)  3. Patient will perform sit to stand with supervision/set-up with Good balance and safety awareness. (Achieved)  4. Patient will ambulate with supervision/set-up for 200 feet with RW on level surfaces and be able to maneuver through narrow spaces and obstacles without loss of balance with WB compliance. (Progressing; at SBA/close supervision)  5. Patient will ascend/descend 3 stairs with NO handrail(s) with stand by assistance to allow for safe home access/exit. (Achieved)  6. Patient will improve standardized test score for [place] Standing Balance Scale 4/5 to reduce fall risk. (Achieved)    PHYSICAL THERAPY LTG GOALS :  Initiated 4/30/2019 and to be accomplished within 4 Weeks (updated 5/27/19)    1. Patient will move from supine to sit and sit to supine  and roll side to side in bed with modified independence.   (progressing; SBA)  2.  Patient will transfer from bed to chair and chair to bed with supervision/set-up using RW with WB compliance. (Maintaining at SBA)  3. Patient will perform sit to stand with supervision/set-up with Good balance and safety awareness. (Maintaining at SBA)  4. Patient will ambulate with supervision/set-up for 200 feet with RW on level surfaces and be able to maneuver through narrow spaces and obstacles without loss of balance with WB compliance. (Maintaining at SBA)  5.   Patient will ascend/descend 3 stairs with NO handrail(s) with stand by assistance to allow for safe home access/exit. (Maintaining at; 3 stairs with BHR with CGA)  6. Patient will improve standardized test score for [place] Standing Balance Scale 4/5 to reduce fall risk. (Maintaining at; 3/5)     PHYSICAL THERAPY LTG GOALS :  Initiated 4/30/2019 and to be accomplished within 4 Weeks (updated 5/21/19)    1. Patient will move from supine to sit and sit to supine  and roll side to side in bed with modified independence.   (progressing; CGA/min A for RLE management)  2. Patient will transfer from bed to chair and chair to bed with supervision/set-up using RW with WB compliance. (Progressing; SBA)  3. Patient will perform sit to stand with supervision/set-up with Good balance and safety awareness. (Progresing; SBA)  4. Patient will ambulate with supervision/set-up for 200 feet with RW on level surfaces and be able to maneuver through narrow spaces and obstacles without loss of balance with WB compliance. (Progressing; SBA)  5. Patient will ascend/descend 3 stairs with NO handrail(s) with stand by assistance to allow for safe home access/exit. (Progresing; 3 stairs with BHR with CGA)  6. Patient will improve standardized test score for [place] Standing Balance Scale 4/5 to reduce fall risk. (Progresing; 3/5)     PHYSICAL THERAPY STG GOALS :  Initiated 4/30/2019 and to be accomplished within 2 Weeks (Updated 5/21/19)    1. Patient will move from supine to sit and sit to supine  and roll side to side in bed with stand by assistance. (Progressing; CGA/Min A for RLE management)    2. Patient will transfer from bed to chair and chair to bed with contact guard assist using RW with WB compliance. (Achieved)  3. Patient will perform sit to stand with contact guard assist with Fair+ balance and safety awareness with WB compliance. (Achieved)   4. Patient will ambulate with contact guard assist for 75 feet with RW on level surfaces with 2 turns with WB compliance. (Achieved)   5.   Patient will ascend/descend 1 step with bilateral handrail(s) with minimal assistance to allow for safe home access/exit with WB compliance. (Achieved with cueing for TTWB compliance, inside // bars)  6. Patient will improve standardized test score for [place] Standing Balance Scale 3/5 to reduce fall risk. (Achieved)    PHYSICAL THERAPY STG GOALS :  Initiated 4/30/2019 and to be accomplished within 2 Weeks (Updated 5/14/19)    1. Patient will move from supine to sit and sit to supine  and roll side to side in bed with stand by assistance. (Progressing; Min A for RLE management)    2. Patient will transfer from bed to chair and chair to bed with contact guard assist using RW with WB compliance. (Achieved)  3. Patient will perform sit to stand with contact guard assist with Fair+ balance and safety awareness with WB compliance. (Achieved)   4. Patient will ambulate with contact guard assist for 75 feet with RW on level surfaces with 2 turns with WB compliance. (Achieved)   5. Patient will ascend/descend 1 step with bilateral handrail(s) with minimal assistance to allow for safe home access/exit with WB compliance. (Achieved with cueing for TTWB compliance, inside // bars)  6. Patient will improve standardized test score for [place] Standing Balance Scale 3/5 to reduce fall risk. (Maintained; 2+/5)    PHYSICAL THERAPY STG GOALS :  Initiated 4/30/2019 and to be accomplished within 2 Weeks (Updated 5/7/19)    1. Patient will move from supine to sit and sit to supine  and roll side to side in bed with stand by assistance. (Progressing; Min-Mod A)    2. Patient will transfer from bed to chair and chair to bed with contact guard assist using RW with WB compliance. (Achieved)  3. Patient will perform sit to stand with contact guard assist with Fair+ balance and safety awareness with WB compliance. (Progressing; CGA-Min A)   4.   Patient will ambulate with contact guard assist for 75 feet with RW on level surfaces with 2 turns with WB compliance. (Progressing; 100ft with RW and CGA, cues for TTWB and no 1 turn)   5. Patient will ascend/descend 1 step with bilateral handrail(s) with minimal assistance to allow for safe home access/exit with WB compliance. (Not assessed due to increased difficulty with TTWB during ambulation)  6. Patient will improve standardized test score for [place] Standing Balance Scale 3/5 to reduce fall risk. (Progressing; 2+/5)    PHYSICAL THERAPY STG GOALS :  Initiated 4/30/2019 and to be accomplished within 2 Weeks    1. Patient will move from supine to sit and sit to supine  and roll side to side in bed with stand by assistance. 2.  Patient will transfer from bed to chair and chair to bed with contact guard assist using RW with WB compliance. 3.  Patient will perform sit to stand with contact guard assist with Fair+ balance and safety awareness with WB compliance. 4.  Patient will ambulate with contact guard assist for 75 feet with RW on level surfaces with 2 turns with WB compliance. 5.  Patient will ascend/descend 1 step with bilateral handrail(s) with minimal assistance to allow for safe home access/exit with WB compliance. 6.  Patient will improve standardized test score for [place] Standing Balance Scale 3/5 to reduce fall risk. PHYSICAL THERAPY LTG GOALS :  Initiated 4/30/2019 and to be accomplished within 4 Weeks    1. Patient will move from supine to sit and sit to supine  and roll side to side in bed with modified independence. 2.  Patient will transfer from bed to chair and chair to bed with supervision/set-up using RW with WB compliance. 3.  Patient will perform sit to stand with supervision/set-up with Good balance and safety awareness. 4.  Patient will ambulate with supervision/set-up for 200 feet with RW on level surfaces and be able to maneuver through narrow spaces and obstacles without loss of balance with WB compliance.    5.  Patient will ascend/descend 3 stairs with NO handrail(s) with stand by assistance to allow for safe home access/exit. 6.  Patient will improve standardized test score for [place] Standing Balance Scale 4/5 to reduce fall risk. Physical Therapist:   Rm Adan PT  on 4/30/2019                   Outcome: Progressing Towards Goal   [facility]   PHYSICAL THERAPY WEEKLY PROGRESS REPORT  Reporting Period:  Date:   5/37/19*  to 6/3/19      Patient: Sj Gonzalez (58 y.o. male)                         Date: 6/7/2019    Primary Diagnosis: rt hip fracture      Attending Physician: Krupa Gomez MD   Treatment Diagnosis  Treatment Diagnosis: difficulty in walking  Treatment Diagnosis 2: muscle weakness  Precautions:  PWB(50% thru RLE)  Rehab Potential : Good:    Skill interventions and education provided with clinical rationale (include individualized treatment techniques and standardized tests):   Skilled Physical Therapy services were provided with Therapeutic exercises to improve endurance, strength, mobility. Therapeutic activities to improve transfers,   bed mobility. Gait training to address gait pattern and improve gait deficits for safe ambulation with least restrictive assistive device. Neuromuscular re-education to improve balance and reduce fall risk. Using a comparative statement, summarize significant progress toward goals as a result of skilled intervention provided:  Patient has made Good progress towards their Physical Therapy goals in the areas of bed mobility, transfers improved from SBA to mod (I) with increased WB status from TTWB to PWB 50%. Improved gait pattern also noted with increased WB status. Identify remaining functional areas, impairments limiting progress and/or barriers to improvement:  Patient would benefit from continues PT services to address the following functional deficits in dynamic standing balance, antalgic gait pattern persists due to PWB status.      OBJECTIVE DATA SUMMARY:     INITIAL ASSESSMENT WEEKLY ASSESSMENT   COGNITIVE STATUS COGNITIVE STATUS   Neurologic State: Alert  Orientation Level: Oriented X4(forgetful at times)  Cognition: Follows commands  Perception: Appears intact  Perseveration: No perseveration noted  Safety/Judgement: Fall prevention Neurologic State: Alert  Orientation Level: Oriented to person, Oriented to place, Oriented to situation  Cognition: Impulsive  Perception: Appears intact  Perseveration: No perseveration noted  Safety/Judgement: Fall prevention   PAIN PAIN   Pain Scale 1: Numeric (0 - 10)  Pain Intensity 1: 4  Pain Onset 1: acute  Pain Location 1: Hip  Pain Orientation 1: Right  Pain Description 1: Aching  Pain Intervention(s) 1: Medication (see MAR)  Patient Stated Pain Goal: 0  Pain Reassessment 1: Yes Pain Scale 1: Numeric (0 - 10)  Pain Intensity 1: 0  Pain Onset 1: movement  Pain Location 1: Hip  Pain Orientation 1: Right  Pain Description 1: Aching  Pain Intervention(s) 1: Medication (see MAR), Repositioned, Rest  Patient Stated Pain Goal: 0  Pain Reassessment 1: Patient resting w/respiratory rate greater than 10   GROSS ASSESSMENT GROSS ASSESSMENT   AROM: Generally decreased, functional  PROM: Generally decreased, functional  Strength: Generally decreased, functional(R hip 2-5, R knee 4+/5; L hip 4-/5, L knee 4+/5)  Coordination: Generally decreased, functional  Tone: Normal  Sensation: Intact(BUEs) AROM: Within functional limits  PROM: Generally decreased, functional  Strength: Within functional limits  Coordination: Within functional limits  Tone: Normal(BUEs)  Sensation: Intact(BUEs)   BED MOBILITY BED MOBILITY   Rolling:  Moderate assistance, Assist x2  Supine to Sit: Moderate assistance(for RLE management)  Scooting: Minimum assistance Rolling: Modified independent  Supine to Sit: Modified independent  Scooting: Modified independent   GAIT GAIT   Base of Support: Center of gravity altered  Speed/Tamanna: Slow  Step Length: Right shortened, Left shortened(R > L)  Stance: Left decreased  Gait Abnormalities: Antalgic, Decreased step clearance  Ambulation - Level of Assistance: Minimal assistance  Distance (ft): 2 Feet (ft)  Assistive Device: Gait belt, Walker, rolling  Rail Use: Both  Stairs - Level of Assistance: Minimum assistance  Number of Stairs Trained: 3(4 inch)  Interventions: Tactile cues, Safety awareness training, Verbal cues Base of Support: Center of gravity altered  Speed/Tamanna: Pace decreased (<100 feet/min)  Step Length: Left shortened, Right shortened  Stance: Left decreased  Gait Abnormalities: Decreased step clearance  Ambulation - Level of Assistance: Supervision  Distance (ft): 240 Feet (ft)(x2)  Assistive Device: Walker, rolling  Rail Use: Both  Stairs - Level of Assistance: Supervision  Number of Stairs Trained: 5  Interventions: Verbal cues, Visual/Demos   Within defined limits Within defined limits   Toe touch Partial (%)(50% PWB)   Full Full   TRANSFERS TRANSFERS   Sit to Stand:  Moderate assistance  Stand to Sit: Moderate assistance  Bed to Chair: Minimum assistance Sit to Stand: Modified independent  Stand to Sit: Modified independent  Bed to Chair: Supervision   BALANCE BALANCE   Sitting: With support  Sitting - Static: Fair (occasional)  Sitting - Dynamic: Fair (occasional)  Standing: With support, Impaired  Standing - Static: Fair  Standing - Dynamic : Fair(-) Sitting: Intact  Sitting - Static: Good (unsupported)  Sitting - Dynamic: Good (unsupported)  Standing: With support  Standing - Static: Good  Standing - Dynamic : Fair(+)   WHEELCHAIR MOBILITY/MGMT WHEELCHAIR MOBILITY/MGMT         Activity Tolerance:  Poor Activity Tolerance: Good   Visual/Perceptual   Corrective Lenses: Reading glasses      Visual/Perceptual   Vision  Corrective Lenses: Reading glasses        Auditory:   Auditory Impairment: Hard of hearing, bilateral    Auditory:   Auditory  Auditory Impairment: Hard of hearing, left side       Steps: both   Clinical Decision makin/5 Clinical Decision makin/5 [place] Standing Balance Scale     Treatment:   Please see treatment notes from 6/3/19 for tx details, pt progressing well towards established goals, will discuss d/c. Patient's response to treatment rendered:  n/a    Patient expected Discharge Location:  ? Private Residence  ? LANRE/ILF  ? LTC  ? Other:    Plan: Continue Skilled PT services as established by the Plan of Care for 3-6 times a week. PT and Assistant have had a weekly case conference regarding the above treatment:  ? Yes     ? No       Treatment session:  0 minutes. Therapist: Milan Sawyer, PT       Date:2019, late entry for 6/3/19  Forward to PT for co-signature when completed.

## 2019-06-07 NOTE — PROGRESS NOTES
attempted to complete a  follow up visit with patient in room 3101 and do a Spiritual assessment of patient but found patient resting peacefully at this time after a busy morning it seems.  Chaplains will continue to follow and will provide pastoral care on an as needed/requested basis    Chaplain Jass Ho   Board Certified [address] [place] [address]   [phone]

## 2019-06-08 VITALS
BODY MASS INDEX: 19.82 KG/M2 | RESPIRATION RATE: 18 BRPM | DIASTOLIC BLOOD PRESSURE: 55 MMHG | HEIGHT: 69 IN | OXYGEN SATURATION: 98 % | TEMPERATURE: 97.1 F | WEIGHT: 133.8 LBS | SYSTOLIC BLOOD PRESSURE: 107 MMHG | HEART RATE: 62 BPM

## 2019-06-08 LAB
GLUCOSE BLD STRIP.AUTO-MCNC: 115 MG/DL (ref 70–110)
GLUCOSE BLD STRIP.AUTO-MCNC: 163 MG/DL (ref 70–110)

## 2019-06-08 PROCEDURE — 74011250637 HC RX REV CODE- 250/637: Performed by: NURSE PRACTITIONER

## 2019-06-08 PROCEDURE — 74011250637 HC RX REV CODE- 250/637: Performed by: INTERNAL MEDICINE

## 2019-06-08 PROCEDURE — 82962 GLUCOSE BLOOD TEST: CPT

## 2019-06-08 RX ADMIN — METFORMIN HYDROCHLORIDE 1000 MG: 500 TABLET ORAL at 07:19

## 2019-06-08 RX ADMIN — LISINOPRIL 10 MG: 10 TABLET ORAL at 09:32

## 2019-06-08 RX ADMIN — PANTOPRAZOLE SODIUM 40 MG: 40 TABLET, DELAYED RELEASE ORAL at 06:30

## 2019-06-08 RX ADMIN — DOCUSATE SODIUM 100 MG: 100 CAPSULE, LIQUID FILLED ORAL at 09:32

## 2019-06-08 RX ADMIN — POLYETHYLENE GLYCOL 3350 17 G: 17 POWDER, FOR SOLUTION ORAL at 09:33

## 2019-06-08 RX ADMIN — CHOLECALCIFEROL (VITAMIN D3) 25 MCG (1,000 UNIT) TABLET 1000 UNITS: at 09:31

## 2019-06-08 NOTE — DISCHARGE INSTRUCTIONS
Patient Education        Surgery to Repair a Hip Fracture: What to Expect at Home  Your Recovery    Surgery for a hip fracture repairs a broken hip bone. When you leave the hospital after surgery, you will probably be walking with crutches or a walker. You may be able to climb a few stairs and get in and out of bed and chairs. But you will need someone to help you at home for the next few weeks or until you have more energy and can move around better. If there is no one to help you at home, you may go to a rehabilitation center or long-term care center. You will go home with a bandage and stitches or staples. You can remove the bandage when your doctor tells you to. Your doctor will remove your stitches or staples 10 days to 3 weeks after your surgery. You may still have some mild pain, and the area may be swollen for 3 to 4 months after surgery. Your doctor will give you medicine for the pain. You will continue the rehabilitation program (rehab) you started in the hospital. The better you do with your rehab exercises, the quicker you will get your strength and movement back. Most people are able to return to work 4 weeks to 4 months after surgery. But it may take 6 months to 1 year for you to fully recover. Some people, especially older people, are never able to move quite as well as they used to. You heal best when you take good care of yourself. Eat a variety of healthy foods, and don't smoke. This care sheet gives you a general idea about how long it will take for you to recover. But each person recovers at a different pace. Follow the steps below to get better as quickly as possible. How can you care for yourself at home? Activity    · Rest when you feel tired. You may take a nap, but do not stay in bed all day.     · Work with your physical therapist to learn the best way to exercise. You may be able to take frequent, short walks using crutches or a walker.  You will probably have to use crutches or a walker for at least 4 to 6 weeks. After that, you may need to use a cane to help you walk.     · Do not sit for longer than 30 to 45 minutes at a time. When you sit, use chairs with arms, and do not sit in low chairs.     · Sleep on your back with your legs slightly apart or on your side with a pillow between your knees for about 6 weeks or as your doctor tells you. Do not sleep on your stomach or affected hip.     · You may need to take sponge baths until your stitches or staples have been removed. You will probably be able to shower 24 hours after they are removed. Ask your doctor when it is okay to bathe or shower.     · Ask your doctor when you can drive again.     · Most people are able to return to work 4 weeks to 4 months after surgery.     · Ask your doctor when it is okay for you to have sex.     · Do not lift anything that would make you strain. This may include heavy grocery bags and milk containers, a heavy briefcase or backpack, cat litter or dog food bags, a vacuum , or a child. Diet    · By the time you leave the hospital, you will probably be eating your normal diet. If your stomach is upset, try bland, low-fat foods like plain rice, broiled chicken, toast, and yogurt. Your doctor may recommend that you take iron and vitamin supplements.     · Continue to drink plenty of fluids.     · Eat healthy foods, and watch your portion sizes. Try to stay at your ideal weight. Too much weight puts more stress on your hip joint.     · You may notice that your bowel movements are not regular right after your surgery. This is common. Try to avoid constipation and straining with bowel movements. You may want to take a fiber supplement every day. If you have not had a bowel movement after a couple of days, ask your doctor about taking a mild laxative.     · Your doctor may want you to take calcium supplements and eat foods high in calcium, such as milk, cheese, ice cream, and salmon with bones.  These help stop bone loss. Orange juice and soy milk with added calcium are also good choices. Medicines    · Your doctor will tell you if and when you can restart your medicines. He or she will also give you instructions about taking any new medicines.     · If you take blood thinners, such as warfarin (Coumadin), clopidogrel (Plavix), or aspirin, be sure to talk to your doctor. He or she will tell you if and when to start taking those medicines again. Make sure that you understand exactly what your doctor wants you to do.     · Your doctor may give you a blood-thinning medicine to prevent blood clots. If you take a blood thinner, be sure you get instructions about how to take your medicine safely. Blood thinners can cause serious bleeding problems. This medicine could be in pill form or as a shot (injection). If a shot is necessary, your doctor will tell you how to do this.     · Be safe with medicines. Take pain medicines exactly as directed. ? If the doctor gave you a prescription medicine for pain, take it as prescribed. ? If you are not taking a prescription pain medicine, ask your doctor if you can take an over-the-counter medicine.     · If you think your pain medicine is making you sick to your stomach:  ? Take your medicine after meals (unless your doctor has told you not to). ? Ask your doctor for a different pain medicine.     · If your doctor prescribed antibiotics, take them as directed. Do not stop taking them just because you feel better. You need to take the full course of antibiotics.     · Your doctor may also prescribe medicines or calcium supplements to make your bones stronger. Incision care    · You will have a bandage over the cut (incision) and staples or stitches. If there is no drainage, most doctors will let you take the bandage off in a few days.     · Your doctor will remove the staples or stitches 10 days to 3 weeks after the surgery and replace them with strips of tape.  Leave the tape on for a week or until it falls off. Exercise    · Your rehab program will include a number of exercises to do. Always do them as your therapist tells you.     · Do not do any vigorous exercise for 12 weeks or until your doctor tells you it is okay. Ice and elevation    · For pain, put ice or a cold pack on the area for 10 to 20 minutes at a time. Put a thin cloth between the ice and your skin.     · Your ankle may swell for about 3 months. Prop up your ankle when you ice it or anytime you sit or lie down. Try to keep it above the level of your heart. This will help reduce swelling. Other instructions    · Continue to wear your support stockings as your doctor says. These help to prevent blood clots. The length of time that you will have to wear them depends on your activity level and the amount of swelling you have. Most people wear these stockings for 4 to 6 weeks after surgery.    Preventing falls is also very important. To prevent falls:    · Arrange furniture so that you will not trip on it.     · Get rid of throw rugs, and move electrical cords out of the way.     · Walk only in areas with plenty of light.     · Put grab bars in showers and bathtubs.     · Avoid icy or snowy sidewalks.     · Wear shoes with sturdy, flat soles. Follow-up care is a key part of your treatment and safety. Be sure to make and go to all appointments, and call your doctor if you are having problems. It's also a good idea to know your test results and keep a list of the medicines you take. When should you call for help? Call 911 anytime you think you may need emergency care.  For example, call if:    · You passed out (lost consciousness).     · You have severe trouble breathing.     · You have sudden chest pain and shortness of breath, or you cough up blood.    Call your doctor now or seek immediate medical care if:    · Your leg or foot is cool or pale or changes color.     · You cannot feel or move your leg.     · You have signs of a blood clot, such as:  ? Pain in your calf, back of the knee, thigh, or groin. ? Redness and swelling in your leg or groin.     · Your incision comes open and begins to bleed, or the bleeding increases.     · You feel like your heart is racing or beating irregularly.     · You have signs of infection, such as:  ? Increased pain, swelling, warmth, or redness. ? Red streaks leading from the incision. ? Pus draining from the incision. ? A fever.    Watch closely for any changes in your health, and be sure to contact your doctor if:    · You do not have a bowel movement after taking a laxative.     · You do not get better as expected. Where can you learn more? Go to http://otilia-ping.info/. Enter L031 in the search box to learn more about \"Surgery to Repair a Hip Fracture: What to Expect at Home. \"  Current as of: September 20, 2018  Content Version: 11.9  © 0312-5517 [employer]. Care instructions adapted under license by [employer] (which disclaims liability or warranty for this information). If you have questions about a medical condition or this instruction, always ask your healthcare professional. [place] [address] any warranty or liability for your use of this information.

## 2019-06-08 NOTE — ROUTINE PROCESS
Bedside and Verbal shift change report given to [address] [place] [address] (oncoming nurse) by Sahara Barraza RN (offgoing nurse). Report included the following information SBAR, Kardex and MAR.

## 2019-06-08 NOTE — ROUTINE PROCESS
Discharged instructions reviewed with wife and family. All questions answered. Instructions and prescriptions given to wife. Son has patients belongings. Patient taken downstairs via wheelchair. Armbands removed. Patient discharged.

## (undated) DEVICE — PREP SKN CHLRAPRP 26ML TNT -- CONVERT TO ITEM 373320

## (undated) DEVICE — SUTURE VCRL SZ 0 L18IN ABSRB UD L36MM CT-1 1/2 CIR J840D

## (undated) DEVICE — KIT PROC HIP PINNING CUST LF --

## (undated) DEVICE — STERILE POLYISOPRENE POWDER-FREE SURGICAL GLOVES: Brand: PROTEXIS

## (undated) DEVICE — SPONGE GZ W4XL4IN COT 12 PLY TYP VII WVN C FLD DSGN

## (undated) DEVICE — STAPLER SKIN H3.9MM WIRE DIA0.58MM CRWN 6.9MM 35 STPL FIX

## (undated) DEVICE — ABDOMINAL PAD: Brand: DERMACEA

## (undated) DEVICE — SUTURE ABSORBABLE BRAIDED 2-0 CT-1 27 IN UD VICRYL J259H

## (undated) DEVICE — DRESSING TRNSPAR 5IN LEN 4IN W FLM ST BIOCL

## (undated) DEVICE — SUTURE VCRL SZ 2-0 L18IN ABSRB VLT L26MM CT-2 1/2 CIR J726D

## (undated) DEVICE — OCCLUSIVE GAUZE STRIP,3% BISMUTH TRIBROMOPHENATE IN PETROLATUM BLEND: Brand: XEROFORM

## (undated) DEVICE — U-DRAPE: Brand: CONVERTORS

## (undated) DEVICE — SOLUTION IV 1000ML 0.9% SOD CHL

## (undated) DEVICE — REM POLYHESIVE ADULT PATIENT RETURN ELECTRODE: Brand: VALLEYLAB